# Patient Record
Sex: MALE | Race: WHITE | NOT HISPANIC OR LATINO | Employment: OTHER | URBAN - METROPOLITAN AREA
[De-identification: names, ages, dates, MRNs, and addresses within clinical notes are randomized per-mention and may not be internally consistent; named-entity substitution may affect disease eponyms.]

---

## 2017-07-19 ENCOUNTER — GENERIC CONVERSION - ENCOUNTER (OUTPATIENT)
Dept: OTHER | Facility: OTHER | Age: 67
End: 2017-07-19

## 2017-08-17 ENCOUNTER — ALLSCRIPTS OFFICE VISIT (OUTPATIENT)
Dept: OTHER | Facility: OTHER | Age: 67
End: 2017-08-17

## 2017-08-17 DIAGNOSIS — Z11.59 ENCOUNTER FOR SCREENING FOR OTHER VIRAL DISEASES: ICD-10-CM

## 2017-08-17 DIAGNOSIS — Z12.5 ENCOUNTER FOR SCREENING FOR MALIGNANT NEOPLASM OF PROSTATE: ICD-10-CM

## 2017-08-17 DIAGNOSIS — I10 ESSENTIAL (PRIMARY) HYPERTENSION: ICD-10-CM

## 2017-08-25 ENCOUNTER — APPOINTMENT (OUTPATIENT)
Dept: LAB | Facility: CLINIC | Age: 67
End: 2017-08-25
Payer: COMMERCIAL

## 2017-08-25 DIAGNOSIS — Z12.5 ENCOUNTER FOR SCREENING FOR MALIGNANT NEOPLASM OF PROSTATE: ICD-10-CM

## 2017-08-25 DIAGNOSIS — I10 ESSENTIAL (PRIMARY) HYPERTENSION: ICD-10-CM

## 2017-08-25 DIAGNOSIS — Z11.59 ENCOUNTER FOR SCREENING FOR OTHER VIRAL DISEASES: ICD-10-CM

## 2017-08-25 LAB
ALBUMIN SERPL BCP-MCNC: 3.4 G/DL (ref 3.5–5)
ALP SERPL-CCNC: 68 U/L (ref 46–116)
ALT SERPL W P-5'-P-CCNC: 25 U/L (ref 12–78)
ANION GAP SERPL CALCULATED.3IONS-SCNC: 9 MMOL/L (ref 4–13)
AST SERPL W P-5'-P-CCNC: 19 U/L (ref 5–45)
BILIRUB SERPL-MCNC: 0.6 MG/DL (ref 0.2–1)
BUN SERPL-MCNC: 13 MG/DL (ref 5–25)
CALCIUM SERPL-MCNC: 8.8 MG/DL (ref 8.3–10.1)
CHLORIDE SERPL-SCNC: 106 MMOL/L (ref 100–108)
CHOLEST SERPL-MCNC: 191 MG/DL (ref 50–200)
CO2 SERPL-SCNC: 25 MMOL/L (ref 21–32)
CREAT SERPL-MCNC: 0.96 MG/DL (ref 0.6–1.3)
GFR SERPL CREATININE-BSD FRML MDRD: 81 ML/MIN/1.73SQ M
GLUCOSE P FAST SERPL-MCNC: 120 MG/DL (ref 65–99)
HDLC SERPL-MCNC: 41 MG/DL (ref 40–60)
LDLC SERPL CALC-MCNC: 105 MG/DL (ref 0–100)
POTASSIUM SERPL-SCNC: 3.7 MMOL/L (ref 3.5–5.3)
PROT SERPL-MCNC: 6.8 G/DL (ref 6.4–8.2)
PSA SERPL-MCNC: 0.4 NG/ML (ref 0–4)
SODIUM SERPL-SCNC: 140 MMOL/L (ref 136–145)
TRIGL SERPL-MCNC: 223 MG/DL

## 2017-08-25 PROCEDURE — 80053 COMPREHEN METABOLIC PANEL: CPT

## 2017-08-25 PROCEDURE — 36415 COLL VENOUS BLD VENIPUNCTURE: CPT

## 2017-08-25 PROCEDURE — 80061 LIPID PANEL: CPT

## 2017-08-25 PROCEDURE — 86803 HEPATITIS C AB TEST: CPT

## 2017-08-25 PROCEDURE — G0103 PSA SCREENING: HCPCS

## 2017-08-26 LAB — HCV AB SER QL: NORMAL

## 2017-08-28 ENCOUNTER — GENERIC CONVERSION - ENCOUNTER (OUTPATIENT)
Dept: OTHER | Facility: OTHER | Age: 67
End: 2017-08-28

## 2017-10-02 ENCOUNTER — GENERIC CONVERSION - ENCOUNTER (OUTPATIENT)
Dept: OTHER | Facility: OTHER | Age: 67
End: 2017-10-02

## 2018-01-09 NOTE — RESULT NOTES
Verified Results  (1) COMPREHENSIVE METABOLIC PANEL [id number] 03:62TT [facility] Order Number: [id number]     Test Name Result Flag Reference   SODIUM 140 mmol/L  136-145   POTASSIUM 3 7 mmol/L  3 5-5 3   CHLORIDE 106 mmol/L  100-108   CARBON DIOXIDE 25 mmol/L  21-32   ANION GAP (CALC) 9 mmol/L  4-13   BLOOD UREA NITROGEN 13 mg/dL  5-25   CREATININE 0 96 mg/dL  0 60-1 30   Standardized to IDMS reference method   CALCIUM 8 8 mg/dL  8 3-10 1   BILI, TOTAL 0 60 mg/dL  0 20-1 00   ALK PHOSPHATAS 68 U/L     ALT (SGPT) 25 U/L  12-78   Specimen collection should occur prior to Sulfasalazine and/or Sulfapyridine administration due to the potential for falsely depressed results  AST(SGOT) 19 U/L  5-45   Specimen collection should occur prior to Sulfasalazine administration due to the potential for falsely depressed results  ALBUMIN 3 4 g/dL L 3 5-5 0   TOTAL PROTEIN 6 8 g/dL  6 4-8 2   eGFR 81 ml/min/1 73sq m     National Kidney Disease Education Program recommendations are as follows:  GFR calculation is accurate only with a steady state creatinine  Chronic Kidney disease less than 60 ml/min/1 73 sq  meters  Kidney failure less than 15 ml/min/1 73 sq  meters  GLUCOSE FASTING 120 mg/dL H 65-99   Specimen collection should occur prior to Sulfasalazine administration due to the potential for falsely depressed results  Specimen collection should occur prior to Sulfapyridine administration due to the potential for falsely elevated results  (1) LIPID PANEL, FASTING [id number] 09:45AM [facility] Order Number: [id number]     Test Name Result Flag Reference   CHOLESTEROL 191 mg/dL     HDL,DIRECT 41 mg/dL  40-60   Specimen collection should occur prior to Metamizole administration due to the potential for falsley depressed results     LDL CHOLESTEROL CALCULATED 105 mg/dL H 0-100   Triglyceride:        Normal ??? ??? ??? ??? ??? ??? ??? <150 mg/dl   ??? ??? ???Borderline High ??? ??? 150-199 mg/dl   ??? ??? ? ?? High ??? ??? ??? ??? ??? ??? ??? 200-499 mg/dl   ??? ??? ? ??Very High ??? ??? ??? ??? ??? >499 mg/dl      Cholesterol:       Desirable ??? ??? ??? ??? <200 mg/dl   ??? ??? Borderline High ??? 200-239 mg/dl   ??? ??? High ??? ??? ??? ??? ??? ??? >239 mg/dl      HDL Cholesterol:       High ??? ???>59 mg/dL   ??? ??? Low ??? ??? <41 mg/dL      This screening LDL is a calculated result  It does not have the accuracy of the Direct Measured LDL in the monitoring of patients with hyperlipidemia and/or statin therapy  Direct Measure LDL ([id number]) must be ordered separately in these patients  TRIGLYCERIDES 223 mg/dL H <=150   Specimen collection should occur prior to N-Acetylcysteine or Metamizole administration due to the potential for falsely depressed results  (1) PSA (SCREEN) (Dx V76 44 Screen for Prostate Cancer) [id number] 09:45AM [facility] Order Number: [id number]     Test Name Result Flag Reference   PROSTATE SPECIFIC ANTIGEN 0 4 ng/mL  0 0-4 0   [employer] Guidelines define biochemical recurrence of prostate cancer as a detectable or rising PSA value post-radical prostatectomy that is greater than or equal to 0 2 ng/mL with a second confirmatory level of greater than or equal to 0 2 ng/mL       (1) HEP C ANTIBODY [id number] 09:45AM [facility] Order Number: [id number]     Test Name Result Flag Reference   HEPATITIS C ANTIBODY Non-reactive  Non-reactive

## 2018-01-13 VITALS
HEART RATE: 73 BPM | BODY MASS INDEX: 28.8 KG/M2 | SYSTOLIC BLOOD PRESSURE: 124 MMHG | TEMPERATURE: 97 F | WEIGHT: 195 LBS | OXYGEN SATURATION: 98 % | DIASTOLIC BLOOD PRESSURE: 72 MMHG

## 2018-01-22 VITALS
TEMPERATURE: 97.6 F | HEART RATE: 65 BPM | DIASTOLIC BLOOD PRESSURE: 88 MMHG | OXYGEN SATURATION: 98 % | WEIGHT: 201 LBS | SYSTOLIC BLOOD PRESSURE: 148 MMHG | BODY MASS INDEX: 29.68 KG/M2

## 2018-05-16 ENCOUNTER — OFFICE VISIT (OUTPATIENT)
Dept: FAMILY MEDICINE CLINIC | Facility: OTHER | Age: 68
End: 2018-05-16
Payer: COMMERCIAL

## 2018-05-16 VITALS
HEIGHT: 67 IN | HEART RATE: 63 BPM | TEMPERATURE: 97.2 F | WEIGHT: 204.6 LBS | DIASTOLIC BLOOD PRESSURE: 72 MMHG | BODY MASS INDEX: 32.11 KG/M2 | SYSTOLIC BLOOD PRESSURE: 130 MMHG | OXYGEN SATURATION: 98 %

## 2018-05-16 DIAGNOSIS — M79.671 RIGHT FOOT PAIN: ICD-10-CM

## 2018-05-16 DIAGNOSIS — I10 BENIGN ESSENTIAL HYPERTENSION: ICD-10-CM

## 2018-05-16 DIAGNOSIS — Z23 NEED FOR PNEUMOCOCCAL VACCINE: Primary | ICD-10-CM

## 2018-05-16 DIAGNOSIS — Z00.00 MEDICARE ANNUAL WELLNESS VISIT, SUBSEQUENT: ICD-10-CM

## 2018-05-16 PROBLEM — J20.9 ACUTE BRONCHITIS: Status: ACTIVE | Noted: 2017-10-02

## 2018-05-16 PROCEDURE — G0009 ADMIN PNEUMOCOCCAL VACCINE: HCPCS | Performed by: FAMILY MEDICINE

## 2018-05-16 PROCEDURE — 3725F SCREEN DEPRESSION PERFORMED: CPT | Performed by: FAMILY MEDICINE

## 2018-05-16 PROCEDURE — 90732 PPSV23 VACC 2 YRS+ SUBQ/IM: CPT | Performed by: FAMILY MEDICINE

## 2018-05-16 PROCEDURE — G0439 PPPS, SUBSEQ VISIT: HCPCS | Performed by: FAMILY MEDICINE

## 2018-05-16 PROCEDURE — 3075F SYST BP GE 130 - 139MM HG: CPT | Performed by: FAMILY MEDICINE

## 2018-05-16 PROCEDURE — 3008F BODY MASS INDEX DOCD: CPT | Performed by: FAMILY MEDICINE

## 2018-05-16 PROCEDURE — 1101F PT FALLS ASSESS-DOCD LE1/YR: CPT | Performed by: FAMILY MEDICINE

## 2018-05-16 PROCEDURE — 3078F DIAST BP <80 MM HG: CPT | Performed by: FAMILY MEDICINE

## 2018-05-16 RX ORDER — AMLODIPINE BESYLATE AND BENAZEPRIL HYDROCHLORIDE 5; 10 MG/1; MG/1
1 CAPSULE ORAL DAILY
COMMUNITY
Start: 2012-01-03 | End: 2018-06-18 | Stop reason: SDUPTHER

## 2018-05-16 NOTE — PATIENT INSTRUCTIONS
Obesity   AMBULATORY CARE:   Obesity  is when your body mass index (BMI) is greater than 30  Your healthcare provider will use your height and weight to measure your BMI  The risks of obesity include  many health problems, such as injuries or physical disability  You may need tests to check for the following:  · Diabetes     · High blood pressure or high cholesterol     · Heart disease     · Gallbladder or liver disease     · Cancer of the colon, breast, prostate, liver, or kidney     · Sleep apnea     · Arthritis or gout  Seek care immediately if:   · You have a severe headache, confusion, or difficulty speaking  · You have weakness on one side of your body  · You have chest pain, sweating, or shortness of breath  Contact your healthcare provider if:   · You have symptoms of gallbladder or liver disease, such as pain in your upper abdomen  · You have knee or hip pain and discomfort while walking  · You have symptoms of diabetes, such as intense hunger and thirst, and frequent urination  · You have symptoms of sleep apnea, such as snoring or daytime sleepiness  · You have questions or concerns about your condition or care  Treatment for obesity  focuses on helping you lose weight to improve your health  Even a small decrease in BMI can reduce the risk for many health problems  Your healthcare provider will help you set a weight-loss goal   · Lifestyle changes  are the first step in treating obesity  These include making healthy food choices and getting regular physical activity  Your healthcare provider may suggest a weight-loss program that involves coaching, education, and therapy  · Medicine  may help you lose weight when it is used with a healthy diet and physical activity  · Surgery  can help you lose weight if you are very obese and have other health problems  There are several types of weight-loss surgery  Ask your healthcare provider for more information    Be successful losing weight:   · Set small, realistic goals  An example of a small goal is to walk for 20 minutes 5 days a week  Anther goal is to lose 5% of your body weight  · Tell friends, family members, and coworkers about your goals  and ask for their support  Ask a friend to lose weight with you, or join a weight-loss support group  · Identify foods or triggers that may cause you to overeat , and find ways to avoid them  Remove tempting high-calorie foods from your home and workplace  Place a bowl of fresh fruit on your kitchen counter  If stress causes you to eat, then find other ways to cope with stress  · Keep a diary to track what you eat and drink  Also write down how many minutes of physical activity you do each day  Weigh yourself once a week and record it in your diary  Eating changes: You will need to eat 500 to 1,000 fewer calories each day than you currently eat to lose 1 to 2 pounds a week  The following changes will help you cut calories:  · Eat smaller portions  Use small plates, no larger than 9 inches in diameter  Fill your plate half full of fruits and vegetables  Measure your food using measuring cups until you know what a serving size looks like  · Eat 3 meals and 1 or 2 snacks each day  Plan your meals in advance  [employer] and eat at home most of the time  Eat slowly  · Eat fruits and vegetables at every meal   They are low in calories and high in fiber, which makes you feel full  Do not add butter, margarine, or cream sauce to vegetables  Use herbs to season steamed vegetables  · Eat less fat and fewer fried foods  Eat more baked or grilled chicken and fish  These protein sources are lower in calories and fat than red meat  Limit fast food  Dress your salads with olive oil and vinegar instead of bottled dressing  · Limit the amount of sugar you eat  Do not drink sugary beverages  Limit alcohol  Activity changes:  Physical activity is good for your body in many ways   It helps you burn calories and build strong muscles  It decreases stress and depression, and improves your mood  It can also help you sleep better  Talk to your healthcare provider before you begin an exercise program   · Exercise for at least 30 minutes 5 days a week  Start slowly  Set aside time each day for physical activity that you enjoy and that is convenient for you  It is best to do both weight training and an activity that increases your heart rate, such as walking, bicycling, or swimming  · Find ways to be more active  Do yard work and housecleaning  Walk up the stairs instead of using elevators  Spend your leisure time going to events that require walking, such as outdoor festivals or fairs  This extra physical activity can help you lose weight and keep it off  Follow up with your healthcare provider as directed: You may need to meet with a dietitian  Write down your questions so you remember to ask them during your visits  © 2017 [address] [employer] Information is for End User's use only and may not be sold, redistributed or otherwise used for commercial purposes  All illustrations and images included in CareNotes® are the copyrighted property of [employer]  or Steven Jarquin  The above information is an  only  It is not intended as medical advice for individual conditions or treatments  Talk to your doctor, nurse or pharmacist before following any medical regimen to see if it is safe and effective for you  Urinary Incontinence   WHAT YOU NEED TO KNOW:   What is urinary incontinence? Urinary incontinence (UI) is when you lose control of your bladder  What causes UI? UI occurs because your bladder cannot store or empty urine properly  The following are the most common types of UI:  · Stress incontinence  is when you leak urine due to increased bladder pressure  This may happen when you cough, sneeze, or exercise       · Urge incontinence  is when you feel the need to urinate right away and leak urine accidentally  · Mixed incontinence  is when you have both stress and urge UI  What are the signs and symptoms of UI?   · You feel like your bladder does not empty completely when you urinate  · You urinate often and need to urinate immediately  · You leak urine when you sleep, or you wake up with the urge to urinate  · You leak urine when you cough, sneeze, exercise, or laugh  How is UI diagnosed? Your healthcare provider will ask how often you leak urine and whether you have stress or urge symptoms  Tell him which medicines you take, how often you urinate, and how much liquid you drink each day  You may need any of the following tests:  · Urine tests  may show infection or kidney function  · A pelvic exam  may be done to check for blockages  A pelvic exam will also show if your bladder, uterus, or other organs have moved out of place  · An x-ray, ultrasound, or CT  may show problems with parts of your urinary system  You may be given contrast liquid to help your organs show up better in the pictures  Tell the healthcare provider if you have ever had an allergic reaction to contrast liquid  Do not enter the MRI room with anything metal  Metal can cause serious injury  Tell the healthcare provider if you have any metal in or on your body  · A bladder scan  will show how much urine is left in your bladder after you urinate  You will be asked to urinate and then healthcare providers will use a small ultrasound machine to check the urine left in your bladder  · Cystometry  is used to check the function of your urinary system  Your healthcare provider checks the pressure in your bladder while filling it with fluid  Your bladder pressure may also be tested when your bladder is full and while you urinate  How is UI treated? · Medicines  can help strengthen your bladder control      · Electrical stimulation  is used to send a small amount of electrical energy to your pelvic floor muscles  This helps control your bladder function  Electrodes may be placed outside your body or in your rectum  For women, the electrodes may be placed in the vagina  · A bulking agent  may be injected into the wall of your urethra to make it thicker  This helps keep your urethra closed and decreases urine leakage  · Devices  such as a clamp, pessary, or tampon may help stop urine leaks  Ask your healthcare provider for more information about these and other devices  · Surgery  may be needed if other treatments do not work  Several types of surgery can help improve your bladder control  Ask your healthcare provider for more information about the surgery you may need  How can I manage my symptoms? · Do pelvic muscle exercises often  Your pelvic muscles help you stop urinating  Squeeze these muscles tight for 5 seconds, then relax for 5 seconds  Gradually work up to squeezing for 10 seconds  Do 3 sets of 15 repetitions a day, or as directed  This will help strengthen your pelvic muscles and improve bladder control  · A catheter  may be used to help empty your bladder  A catheter is a tiny, plastic tube that is put into your bladder to drain your urine  Your healthcare provider may tell you to use a catheter to prevent your bladder from getting too full and leaking urine  · Keep a UI record  Write down how often you leak urine and how much you leak  Make a note of what you were doing when you leaked urine  · Train your bladder  Go to the bathroom at set times, such as every 2 hours, even if you do not feel the urge to go  You can also try to hold your urine when you feel the urge to go  For example, hold your urine for 5 minutes when you feel the urge to go  As that becomes easier, hold your urine for 10 minutes  · Drink liquids as directed  Ask your healthcare provider how much liquid to drink each day and which liquids are best for you   You may need to limit the amount of liquid you drink to help control your urine leakage  Limit or do not have drinks that contain caffeine or alcohol  Do not drink any liquid right before you go to bed  · Prevent constipation  Eat a variety of high-fiber foods  Good examples are high-fiber cereals, beans, vegetables, and whole-grain breads  Prune juice may help make your bowel movement softer  Walking is the best way to trigger your intestines to have a bowel movement  · Exercise regularly and maintain a healthy weight  Ask your healthcare provider how much you should weigh and about the best exercise plan for you  Weight loss and exercise will decrease pressure on your bladder and help you control your leakage  Ask him to help you create a weight loss plan if you are overweight  When should I seek immediate care? · You have severe pain  · You are confused or cannot think clearly  When should I contact my healthcare provider? · You have a fever  · You see blood in your urine  · You have pain when you urinate  · You have new or worse pain, even after treatment  · Your mouth feels dry or you have vision changes  · Your urine is cloudy or smells bad  · You have questions or concerns about your condition or care  CARE AGREEMENT:   You have the right to help plan your care  Learn about your health condition and how it may be treated  Discuss treatment options with your caregivers to decide what care you want to receive  You always have the right to refuse treatment  The above information is an  only  It is not intended as medical advice for individual conditions or treatments  Talk to your doctor, nurse or pharmacist before following any medical regimen to see if it is safe and effective for you  © 2017 [address] [employer] Information is for End User's use only and may not be sold, redistributed or otherwise used for commercial purposes   All illustrations and images included in CareNotes® are the copyrighted property of [employer]  or Steven Jarquin  Cigarette Smoking and Your Health   AMBULATORY CARE:   Risks to your health if you smoke:  Nicotine and other chemicals found in tobacco damage every cell in your body  Even if you are a light smoker, you have an increased risk for cancer, heart disease, and lung disease  If you are pregnant or have diabetes, smoking increases your risk for complications  Benefits to your health if you stop smoking:   · You decrease respiratory symptoms such as coughing, wheezing, and shortness of breath  · You reduce your risk for cancers of the lung, mouth, throat, kidney, bladder, pancreas, stomach, and cervix  If you already have cancer, you increase the benefits of chemotherapy  You also reduce your risk for cancer returning or a second cancer from developing  · You reduce your risk for heart disease, blood clots, heart attack, and stroke  · You reduce your risk for lung infections, and diseases such as pneumonia, asthma, chronic bronchitis, and emphysema  · Your circulation improves  More oxygen can be delivered to your body  If you have diabetes, you lower your risk for complications, such as kidney, artery, and eye diseases  You also lower your risk for nerve damage  Nerve damage can lead to amputations, poor vision, and blindness  · You improve your body's ability to heal and to fight infections  Benefits to the health of others if you stop smoking:  Tobacco is harmful to nonsmokers who breathe in your secondhand smoke  The following are ways the health of others around you may improve when you stop smoking:  · You lower the risks for lung cancer and heart disease in nonsmoking adults  · If you are pregnant, you lower the risk for miscarriage, early delivery, low birth weight, and stillbirth  You also lower your baby's risk for SIDS, obesity, developmental delay, and neurobehavioral problems, such as ADHD  · If you have children, you lower their risk for ear infections, colds, pneumonia, bronchitis, and asthma  For more information and support to stop smoking:   · Smokefree  gov  Phone: 2- 953 - 538-3139  Web Address: www smokefree  gov  Follow up with your healthcare provider as directed:  Write down your questions so you remember to ask them during your visits  © 2017 [facility] Information is for End User's use only and may not be sold, redistributed or otherwise used for commercial purposes  All illustrations and images included in CareNotes® are the copyrighted property of [employer]  or Steven Jarquin  The above information is an  only  It is not intended as medical advice for individual conditions or treatments  Talk to your doctor, nurse or pharmacist before following any medical regimen to see if it is safe and effective for you  Fall Prevention   WHAT YOU NEED TO KNOW:   What is fall prevention? Fall prevention includes ways to make your home and other areas safer  It also includes ways you can move more carefully to prevent a fall  What increases my risk for falls? · Lack of vitamin D    · Not getting enough sleep each night    · Trouble walking or keeping your balance, or foot problems    · Health conditions that cause changes in your blood pressure, vision, or muscle strength and coordination    · Medicines that make you dizzy, weak, or sleepy    · Problems seeing clearly    · Shoes that have high heels or are not supportive    · Tripping hazards, such as items left on the floor, no handrails on the stairs, or broken steps  How can I help protect myself from falls? · Stand or sit up slowly  This may help you keep your balance and prevent falls  If you need to get up during the night, sit up first  Be sure you are fully awake before you stand  Turn on the light before you start walking  Go slowly in case you are still sleepy   Make sure you will not trip over any pets sleeping in the bedroom  · Use assistive devices as directed  Your healthcare provider may suggest that you use a cane or walker to help you keep your balance  You may need to have grab bars put in your bathroom near the toilet or in the shower  · Wear shoes that fit well and have soles that   Wear shoes both inside and outside  Use slippers with good   Do not wear shoes with high heels  · Wear a personal alarm  This is a device that allows you to call 911 if you fall and need help  Ask your healthcare provider for more information  · Stay active  Exercise can help strengthen your muscles and improve your balance  Your healthcare provider may recommend water aerobics or walking  He or she may also recommend physical therapy to improve your coordination  Never start an exercise program without talking to your healthcare provider first      · Manage medical conditions  Keep all appointments with your healthcare providers  Visit your eye doctor as directed  How can I make my home safer? · Add items to prevent falls in the bathroom  Put nonslip strips on your bath or shower floor to prevent you from slipping  Use a bath mat if you do not have carpet in the bathroom  This will prevent you from falling when you step out of the bath or shower  Use a shower seat so you do not need to stand while you shower  Sit on the toilet or a chair in your bathroom to dry yourself and put on clothing  This will prevent you from losing your balance from drying or dressing yourself while you are standing  · Keep paths clear  Remove books, shoes, and other objects from walkways and stairs  Place cords for telephones and lamps out of the way so that you do not need to walk over them  Tape them down if you cannot move them  Remove small rugs  If you cannot remove a rug, secure it with double-sided tape  This will prevent you from tripping  · Install bright lights in your home  Use night lights to help light paths to the bathroom or kitchen  Always turn on the light before you start walking  · Keep items you use often on shelves within reach  Do not use a step stool to help you reach an item  · Paint or place reflective tape on the edges of your stairs  This will help you see the stairs better  Call 911 or have someone else call if:   · You have fallen and are unconscious  · You have fallen and cannot move part of your body  Contact your healthcare provider if:   · You have fallen and have pain or a headache  · You have questions or concerns about your condition or care  CARE AGREEMENT:   You have the right to help plan your care  Learn about your health condition and how it may be treated  Discuss treatment options with your caregivers to decide what care you want to receive  You always have the right to refuse treatment  The above information is an  only  It is not intended as medical advice for individual conditions or treatments  Talk to your doctor, nurse or pharmacist before following any medical regimen to see if it is safe and effective for you  © 2017 [address] [facility] Information is for End User's use only and may not be sold, redistributed or otherwise used for commercial purposes  All illustrations and images included in CareNotes® are the copyrighted property of [employer]  or Steven Jarquin  Advance Directives   WHAT YOU NEED TO KNOW:   What are advance directives? Advance directives are legal documents that state your wishes and plans for medical care  These plans are made ahead of time in case you lose your ability to make decisions for yourself  Advance directives can apply to any medical decision, such as the treatments you want, and if you want to donate organs  What are the types of advance directives? There are many types of advance directives, and each state has rules about how to use them   You may choose a combination of any of the following:  · Living will: This is a written record of the treatment you want  You can also choose which treatments you do not want, which to limit, and which to stop at a certain time  This includes surgery, medicine, IV fluid, and tube feedings  · Durable power of  for healthcare [facility]): This is a written record that states who you want to make healthcare choices for you when you are unable to make them for yourself  This person, called a proxy, is usually a family member or a friend  You may choose more than 1 proxy  · Do not resuscitate (DNR) order:  A DNR order is used in case your heart stops beating or you stop breathing  It is a request not to have certain forms of treatment, such as CPR  A DNR order may be included in other types of advance directives  · Medical directive: This covers the care that you want if you are in a coma, near death, or unable to make decisions for yourself  You can list the treatments you want for each condition  Treatment may include pain medicine, surgery, blood transfusions, dialysis, IV or tube feedings, and a ventilator (breathing machine)  · Values history: This document has questions about your views, beliefs, and how you feel and think about life  This information can help others choose the care that you would choose  Why are advance directives important? An advance directive helps you control your care  Although spoken wishes may be used, it is better to have your wishes written down  Spoken wishes can be misunderstood, or not followed  Treatments may be given even if you do not want them  An advance directive may make it easier for your family to make difficult choices about your care  How do I decide what to put in my advance directives? · Make informed decisions:  Make sure you fully understand treatments or care you may receive   Think about the benefits and problems your decisions could cause for you or your family  Talk to healthcare providers if you have concerns or questions before you write down your wishes  You may also want to talk with your Catholic or , or a   Check your state laws to make sure that what you put in your advance directive is legal      · Sign all forms:  Sign and date your advance directive when you have finished  You may also need 2 witnesses to sign the forms  Witnesses cannot be your doctor or his staff, your spouse, heirs or beneficiaries, people you owe money to, or your chosen proxy  Talk to your family, proxy, and healthcare providers about your advance directive  Give each person a copy, and keep one for yourself in a place you can get to easily  Do not keep it hidden or locked away  · Review and revise your plans: You can revise your advance directive at any time, as long as you are able to make decisions  Review your plan every year, and when there are changes in your life, or your health  When you make changes, let your family, proxy, and healthcare providers know  Give each a new copy  Where can I find more information? · [employer]  [address] , [address]   Phone: 0- [phone] - [phone]  Phone: 6- 164 - [phone]  Web Address: http://www  aafp org  · [address] [facility])  [address] [facility], [address]  Phone: 1- [phone] - [phone]  Phone: [phone]  Web Address: [address]  [facility] AGREEMENT:   You have the right to help plan your care  To help with this plan, you must learn about your health condition and treatment options  You must also learn about advance directives and how they are used  Work with your healthcare providers to decide what care will be used to treat you  You always have the right to refuse treatment  The above information is an  only   It is not intended as medical advice for individual conditions or treatments  Talk to your doctor, nurse or pharmacist before following any medical regimen to see if it is safe and effective for you  © 2017 [address] [employer] Information is for End User's use only and may not be sold, redistributed or otherwise used for commercial purposes  All illustrations and images included in CareNotes® are the copyrighted property of [employer]  or [address]

## 2018-05-16 NOTE — PROGRESS NOTES
HPI:  Maria A Muniz is a 69 y o  male here for his Subsequent Wellness Visit  Patient Active Problem List   Diagnosis    Acute bronchitis    Benign essential hypertension    Diarrhea    Lower back pain    Rib pain on left side     Past Medical History:   Diagnosis Date    Cataract, right eye     last assessed 04/21/2015    Hypertension     Pneumonia      Past Surgical History:   Procedure Laterality Date    CATARACT EXTRACTION, BILATERAL      MYRINGOTOMY W/ TUBES       Family History   Problem Relation Age of Onset    Hypertension Father     No Known Problems Mother      History   Smoking Status    Never Smoker   Smokeless Tobacco    Never Used     History   Alcohol Use    Yes     Comment: social      History   Drug Use No     /72 (BP Location: Right arm, Patient Position: Sitting, Cuff Size: Standard)   Pulse 63   Temp (!) 97 2 °F (36 2 °C) (Tympanic)   Ht 5' 7" (1 702 m)   Wt 92 8 kg (204 lb 9 6 oz)   SpO2 98%   BMI 32 04 kg/m²       Current Outpatient Prescriptions   Medication Sig Dispense Refill    amLODIPine-benazepril (LOTREL 5-10) 5-10 MG per capsule Take 1 capsule by mouth daily       No current facility-administered medications for this visit        Allergies   Allergen Reactions    Penicillamine     Penicillins      Immunization History   Administered Date(s) Administered    Influenza Quadrivalent Preservative Free 3 years and older IM 10/21/2014    Influenza Split High Dose Preservative Free IM 10/27/2015, 10/26/2016    Influenza TIV (IM) 01/15/2013, 10/29/2013    Pneumococcal Conjugate 13-Valent 08/17/2017    Pneumococcal Polysaccharide PPV23 05/16/2018    Td (adult), adsorbed 10/31/2000       Patient Care Team:  Derick Valladares DO as PCP - General    Medicare Screening Tests and Risk Assessments:  AWV Clinical     ISAR:   Previous hospitalizations?:  No       Once in a Lifetime Medicare Screening:       Medicare Screening Tests and Risk Assessment:   AAA Risk Assessment    Osteoporosis Risk Assessment    HIV Risk Assessment        Drug and Alcohol Use:   Tobacco use    Cigarettes:  never smoker    Tobacco use duration    Tobacco Cessation Readiness    Alcohol use    Alcohol use:  occasional use    Alcohol Treatment Readiness   Illicit Drug Use    Drug use:  never        Diet & Exercise:   Diet   What is your diet?:  Frequent junk food   How many servings a day of the following:   Fruits and Vegetables:  1-2     Soda:  1   Exercise    Do you currently exercise?:  currently not exercising       Cognitive Impairment Screening:   Cognitive Impairment Screening    Do you have difficulty learning or retaining new information?:  No Do you have difficulty handling new tasks?:  No   Do you have difficulty with reasoning?:  No    Do you have difficulty with language?:  No        Functional Ability/Level of Safety:   Hearing    Hearing difficulties:  Yes    Hearing aid:  No    Hearing Impairment Assessment    Current Activities    Help needed with the folllowing:    Using the phone:  No Transportation:  No   Shopping:  No Preparing Meals:  No   Doing Housework:  No Doing Laundry:  No   Managing Medications:  No Managing Money:  No   ADL    Fall Risk   Have you fallen in the last 12 months?:  No    Injury History       Home Safety:   Home Safety Risk Factors    Uneven floors:  No    Loose rugs:  No   Household clutter:  No Poor household lighting:  No       Advanced Directives:   Advanced Directives    Living Will:  Yes Durable POA for healthcare:   Yes   Advanced directive:  Yes    Patient's End of Life Decisions        Urinary Incontinence:       Glaucoma:            Provider Screening     Preventative Screening/Counseling:   Cardiovascular Screening/Counseling:   (Labs Q5 years, EKG optional one-time)   General:  Risks and Benefits Discussed, Patient Declines Counseling:  Healthy Diet, Healthy Weight, Improve Cholesterol, Improve Blood Pressure, Improve Exercise Tolerance Diabetes Screening/Counseling:   (2 tests/year if Pre-Diabetes or 1 test/year if no Diabetes)   General:  Risks and Benefits Discussed, Screening Current Counseling:  Healthy Diet, Healthy Weight, Improve Physical Activity Due for: Labs:  Blood Glucose         Colorectal Cancer Screening/Counseling:   (FOBT Q1 yr; Flex Sig Q4 yrs or Q10 yrs after Screening Colonoscopy; Screening Colonoscpy Q2 yrs High Risk or Q10 yrs Low Risk; Barium Enema Q2 yrs High Risk or Q4 yrs Low Risk)   General:  Risks and Benefits Discussed, Patient Declines Counseling:  high fiber diet          Prostate Cancer Screening/Counseling:   (Annual)    General:  Risks and Benefits Discussed, Patient Declines          Breast Cancer Screening/Counseling:   (Baseline Age 28 - 43; Annual Age 36+)         Cervical Cancer Screening/Counseling:   (Annual for High Risk or Childbearing Age with Abnormal Pap in Last 3 yrs; Every 2 all others)         Osteoporosis Screening/Counseling:   (Every 2 Yrs if at risk or more if medically necessary)   General:  Risks and Benefits Discussed, Patient Declines Counseling:  Calcium and Vitamin D Intake, Regular Weightbearing Exercise          AAA Screening/Counseling:   (Once per Lifetime with risk factors)          Glaucoma Screening/Counseling:   (Annual)   General:  Screening Current          HIV Screening/Counseling:   (Voluntary;  Once annually for high risk OR 3 times for Pregnancy at diagnosis of IUP; 3rd trimester; and at Labor         Hepatitis C Screening:             Immunizations:   Pneumococcal (Once in a Lifetime):  Risks & Benefits Discussed, Pneumococcal Due Today   Tdap (Non-Medicare Wellness Visit required):  Risks & Benefits Discussed, Patient Declines       Other Preventative Couseling (Non-Medicare Wellness Visit Required):   nutrition counseling performed, fall prevention education provided, car/seat belt/driving safety reviewed, alcohol use counseling provided, sunscreen education provided, weight reduction was discussed, Skin self-exam counseling given, Increased physical activity counseling given       Referrals (Non-Medicare Wellness Visit Required):       Medical Equipment/Suppliers:           No exam data present    Physical Exam :  Physical Exam   Constitutional: He is oriented to person, place, and time  He appears well-developed and well-nourished  No distress  HENT:   Head: Normocephalic and atraumatic  Right Ear: External ear normal    Left Ear: External ear normal    Nose: Nose normal    Mouth/Throat: Oropharynx is clear and moist  No oropharyngeal exudate  Eyes: Conjunctivae and EOM are normal  Pupils are equal, round, and reactive to light  Right eye exhibits no discharge  Left eye exhibits no discharge  No scleral icterus  Neck: Normal range of motion  Neck supple  No thyromegaly present  Cardiovascular: Normal rate, regular rhythm and normal heart sounds  No murmur heard  Pulmonary/Chest: Effort normal and breath sounds normal  No respiratory distress  He has no wheezes  Abdominal: Soft  Bowel sounds are normal  He exhibits no distension  There is no tenderness  Lymphadenopathy:     He has no cervical adenopathy  Neurological: He is alert and oriented to person, place, and time  He displays normal reflexes  No cranial nerve deficit  Skin: Skin is warm and dry  No rash noted  He is not diaphoretic  No pallor  Psychiatric: He has a normal mood and affect  His behavior is normal    Nursing note and vitals reviewed  Reviewed Updated [facility] Prior Wellness Visits:   Last Medicare wellness visit information was reviewed, patient interviewed , no change since last AWVyes  Last Medicare wellness visit information was reviewed, patient interviewed and updates made to the record today yes    Assessment and Plan:  1  Need for pneumococcal vaccine  Pneumococcal Polysaccharide Vaccine 23-Valent =>3yo SQ IM   2  Medicare annual wellness visit, subsequent     3   Right foot pain  XR foot 3+ vw right    CBC and differential    Comprehensive metabolic panel    TSH, 3rd generation with T4 reflex   4   Benign essential hypertension  CBC and differential    Comprehensive metabolic panel    TSH, 3rd generation with T4 reflex       Health Maintenance Due   Topic Date Due    SLP PLAN OF CARE  1950    GLAUCOMA SCREENING 67+ YR  12/09/2015

## 2018-06-18 DIAGNOSIS — I10 ESSENTIAL HYPERTENSION: Primary | ICD-10-CM

## 2018-06-18 RX ORDER — AMLODIPINE BESYLATE AND BENAZEPRIL HYDROCHLORIDE 5; 10 MG/1; MG/1
CAPSULE ORAL
Qty: 30 CAPSULE | Refills: 3 | Status: SHIPPED | OUTPATIENT
Start: 2018-06-18 | End: 2018-07-24 | Stop reason: SDUPTHER

## 2018-07-24 DIAGNOSIS — I10 ESSENTIAL HYPERTENSION: ICD-10-CM

## 2018-07-24 RX ORDER — AMLODIPINE BESYLATE AND BENAZEPRIL HYDROCHLORIDE 5; 10 MG/1; MG/1
CAPSULE ORAL
Qty: 90 CAPSULE | Refills: 1 | Status: SHIPPED | OUTPATIENT
Start: 2018-07-24 | End: 2019-04-12 | Stop reason: SDUPTHER

## 2018-07-27 ENCOUNTER — OFFICE VISIT (OUTPATIENT)
Dept: FAMILY MEDICINE CLINIC | Facility: OTHER | Age: 68
End: 2018-07-27
Payer: COMMERCIAL

## 2018-07-27 VITALS
BODY MASS INDEX: 31.92 KG/M2 | DIASTOLIC BLOOD PRESSURE: 70 MMHG | OXYGEN SATURATION: 98 % | HEART RATE: 63 BPM | SYSTOLIC BLOOD PRESSURE: 124 MMHG | TEMPERATURE: 99.3 F | WEIGHT: 203.8 LBS

## 2018-07-27 DIAGNOSIS — B35.4 RINGWORM OF BODY: Primary | ICD-10-CM

## 2018-07-27 PROCEDURE — 99214 OFFICE O/P EST MOD 30 MIN: CPT | Performed by: FAMILY MEDICINE

## 2018-07-27 RX ORDER — CLOTRIMAZOLE AND BETAMETHASONE DIPROPIONATE 10; .64 MG/G; MG/G
CREAM TOPICAL 2 TIMES DAILY
Qty: 30 G | Refills: 0 | Status: SHIPPED | OUTPATIENT
Start: 2018-07-27 | End: 2018-11-28

## 2018-07-27 NOTE — PATIENT INSTRUCTIONS
Skin Yeast Infection   WHAT YOU NEED TO KNOW:   Yeast is normally present on the skin  Infection happens when you have too much yeast, or when it gets into a cut on your skin  Certain types of mold and fungus can cause a yeast infection  A skin yeast infection can appear anywhere on your skin or nail beds  Skin yeast infections are usually found on warm, moist parts of the body  Examples include between skin folds or under the breasts  DISCHARGE INSTRUCTIONS:   Return to the emergency department if:   · You have signs of infection, such as pus, warmth or red streaks coming from the wound, or a fever  Contact your healthcare provider if:   · Your symptoms worsen or do not get better within 7 to 10 days  · You have new or returning signs of a skin yeast infection after treatment  · You have questions or concerns about your condition or care  Medicines:   · Antifungal medicine  may be given as a cream, ointment, or pill  · Take your medicine as directed  Contact your healthcare provider if you think your medicine is not helping or if you have side effects  Tell him or her if you are allergic to any medicine  Keep a list of the medicines, vitamins, and herbs you take  Include the amounts, and when and why you take them  Bring the list or the pill bottles to follow-up visits  Carry your medicine list with you in case of an emergency  Care for the skin near the infection:  You may only have discolored patches of skin, or areas that are dry and flaking  Care for these skin problems as directed by your healthcare provider  If you have painful skin or an open sore, you will need to protect the skin and prevent damage  You will also need to keep the skin dry as much as possible  Ask your healthcare provider how to care for your skin while the infection clears  The following are general guidelines for caring for painful or open skin:  · Keep the skin clean    Ask your healthcare provider if you should wash with mild soap and water  Do not use soap that contains alcohol  Alcohol can dry and irritate the skin and make symptoms worse  Your baby's healthcare provider may tell you to use diaper cream or ointment when you change his diaper  This will protect the skin and prevent moisture from collecting  · Keep the skin dry  Pat the area dry with a towel  Do not rub, because this may irritate the skin  If you have a skin yeast infection between skin folds, lift the top part gently and hold it while you dry between your skin folds  Always dry your feet completely after you swim or bathe, including between your toes  Dry your skin if you are sweating from exercise or exposure to heat  Use a clean towel each time to prevent spreading or continuing the infection  · Keep the skin protected  Ask your healthcare provider if you should cover the area with a bandage or leave it open  Check your skin each day to make sure you do not have new or worsening problems  You may need to have someone check the skin if you cannot see the area easily  Prevent another skin yeast infection:   · Do not share clothing or towels    · Wear shower shoes if you need to use a public shower    · Dry your feet completely after you bathe, and apply antifungal powder or cream as directed    · Put on socks before you get dressed so you do not spread fungus from your feet    · Wear light clothing that allows air to get to your skin    · Manage your weight to prevent skin folds where yeast can collect    · Manage diabetes    · Change your baby's diaper often, and keep the area clean and dry as much as possible    · Use a diaper cream or ointment that contains zinc oxide or dimethicone on your baby's diaper area as directed  Follow up with your healthcare provider as directed:  Write down your questions so you remember to ask them during your visits     © 2017 [employer] Information is for End User's use only and may not be sold, redistributed or otherwise used for commercial purposes  All illustrations and images included in CareNotes® are the copyrighted property of [employer]  or Steven Jarquin  The above information is an  only  It is not intended as medical advice for individual conditions or treatments  Talk to your doctor, nurse or pharmacist before following any medical regimen to see if it is safe and effective for you

## 2018-07-27 NOTE — PROGRESS NOTES
Assessment/Plan:  Patient info hand out provided today  Keep the skin clean  Apply the cream bid for 2 weeks or until the rash is gone  If any change in rash or worsening to call or come in for recheck  FU PRN        Problem List Items Addressed This Visit     None      Visit Diagnoses     Ringworm of body    -  Primary    Relevant Medications    clotrimazole-betamethasone (LOTRISONE) 1-0 05 % cream            Subjective:      Patient ID: Booker Dobson is a 69 y o  male  Rash   This is a new problem  The problem is unchanged  The affected locations include the right upper leg  The rash is characterized by dryness, redness, peeling and itchiness  It is unknown if there was an exposure to a precipitant  Pertinent negatives include no anorexia, congestion, cough, diarrhea, facial edema, fatigue, fever, shortness of breath or vomiting  Past treatments include nothing  The treatment provided no relief  The following portions of the patient's history were reviewed and updated as appropriate: allergies, current medications, past family history, past medical history, past social history, past surgical history and problem list     Review of Systems   Constitutional: Negative for chills, fatigue and fever  HENT: Negative for congestion  Respiratory: Negative for cough and shortness of breath  Cardiovascular: Negative for chest pain and leg swelling  Gastrointestinal: Negative for abdominal pain, anorexia, diarrhea, nausea and vomiting  Genitourinary: Negative for dysuria and flank pain  Musculoskeletal: Negative for arthralgias and myalgias  Skin: Positive for rash  Negative for pallor and wound  Neurological: Negative for tremors, weakness and numbness  Hematological: Negative for adenopathy           Objective:      /70 (BP Location: Left arm, Patient Position: Sitting, Cuff Size: Standard)   Pulse 63   Temp 99 3 °F (37 4 °C) (Tympanic)   Wt 92 4 kg (203 lb 12 8 oz)   SpO2 98% BMI 31 92 kg/m²          Physical Exam   Constitutional: He appears well-developed and well-nourished  No distress  HENT:   Head: Normocephalic and atraumatic  Neck: Normal range of motion  Neck supple  Cardiovascular: Normal rate, regular rhythm, normal heart sounds and intact distal pulses  No murmur heard  Pulmonary/Chest: Effort normal and breath sounds normal  No respiratory distress  He has no wheezes  Skin: Skin is warm and dry  Rash noted  He is not diaphoretic  No erythema  No pallor  There is a oval patch of slightly raised with scaling and erythema of the lateral left high measuring 2 5 cm in diameter  Per patient its very itchy  No pain or induration upon palpation noted  No purulence  No central bite phuong noted  Psychiatric: He has a normal mood and affect  His behavior is normal    Nursing note and vitals reviewed

## 2018-11-16 ENCOUNTER — OFFICE VISIT (OUTPATIENT)
Dept: FAMILY MEDICINE CLINIC | Facility: OTHER | Age: 68
End: 2018-11-16
Payer: COMMERCIAL

## 2018-11-16 VITALS
SYSTOLIC BLOOD PRESSURE: 138 MMHG | OXYGEN SATURATION: 98 % | HEART RATE: 64 BPM | BODY MASS INDEX: 31.13 KG/M2 | WEIGHT: 205.4 LBS | TEMPERATURE: 98.5 F | HEIGHT: 68 IN | DIASTOLIC BLOOD PRESSURE: 84 MMHG

## 2018-11-16 DIAGNOSIS — J20.9 ACUTE BRONCHITIS, UNSPECIFIED ORGANISM: Primary | ICD-10-CM

## 2018-11-16 DIAGNOSIS — R04.0 EPISTAXIS: ICD-10-CM

## 2018-11-16 PROCEDURE — 3008F BODY MASS INDEX DOCD: CPT | Performed by: NURSE PRACTITIONER

## 2018-11-16 PROCEDURE — 99214 OFFICE O/P EST MOD 30 MIN: CPT | Performed by: NURSE PRACTITIONER

## 2018-11-16 RX ORDER — AZITHROMYCIN 250 MG/1
TABLET, FILM COATED ORAL
Qty: 6 TABLET | Refills: 0 | Status: SHIPPED | OUTPATIENT
Start: 2018-11-16 | End: 2018-11-21

## 2018-11-16 RX ORDER — BENZONATATE 200 MG/1
200 CAPSULE ORAL 3 TIMES DAILY PRN
Qty: 20 CAPSULE | Refills: 0 | Status: SHIPPED | OUTPATIENT
Start: 2018-11-16 | End: 2018-11-28

## 2018-11-16 RX ORDER — INFLUENZA A VIRUSA/MICHIGAN/45/2015 X-275 (H1N1) ANTIGEN (FORMALDEHYDE INACTIVATED), INFLUENZA A VIRUS A/HONG KONG/4801/2014 X-263B (H3N2) ANTIGEN (FORMALDEHYDE INACTIVATED), AND INFLUENZA B VIRUS B/BRISBANE/60/2008 ANTIGEN (FORMALDEHYDE INACTIVATED) 60; 60; 60 UG/.5ML; UG/.5ML; UG/.5ML
INJECTION, SUSPENSION INTRAMUSCULAR
Refills: 0 | COMMUNITY
Start: 2018-11-06 | End: 2018-11-16

## 2018-11-16 NOTE — PATIENT INSTRUCTIONS
Acute Bronchitis   WHAT YOU NEED TO KNOW:   Acute bronchitis is swelling and irritation in the air passages of your lungs  This irritation may cause you to cough or have other breathing problems  Acute bronchitis often starts because of another illness, such as a cold or the flu  The illness spreads from your nose and throat to your windpipe and airways  Bronchitis is often called a chest cold  Acute bronchitis lasts about 3 to 6 weeks and is usually not a serious illness  Your cough can last for several weeks  DISCHARGE INSTRUCTIONS:   Return to the emergency department if:   · You cough up blood  · Your lips or fingernails turn blue  · You feel like you are not getting enough air when you breathe  Contact your healthcare provider if:   · You have a fever  · Your breathing problems do not go away or get worse  · Your cough does not get better within 4 weeks  · You have questions or concerns about your condition or care  Self-care:   · Get more rest   Rest helps your body to heal  Slowly start to do more each day  Rest when you feel it is needed  · Avoid irritants in the air  Avoid chemicals, fumes, and dust  Wear a face mask if you must work around dust or fumes  Stay inside on days when air pollution levels are high  If you have allergies, stay inside when pollen counts are high  Do not use aerosol products, such as spray-on deodorant, bug spray, and hair spray  · Do not smoke or be around others who smoke  Nicotine and other chemicals in cigarettes and cigars damages the cilia that move mucus out of your lungs  Ask your healthcare provider for information if you currently smoke and need help to quit  E-cigarettes or smokeless tobacco still contain nicotine  Talk to your healthcare provider before you use these products  · Drink liquids as directed  Liquids help keep your air passages moist and help you cough up mucus   You may need to drink more liquids when you have acute bronchitis  Ask how much liquid to drink each day and which liquids are best for you  · Use a humidifier or vaporizer  Use a cool mist humidifier or a vaporizer to increase air moisture in your home  This may make it easier for you to breathe and help decrease your cough  Decrease risk for acute bronchitis:   · Get the vaccinations you need  Ask your healthcare provider if you should get vaccinated against the flu or pneumonia  · Prevent the spread of germs  You can decrease your risk of acute bronchitis and other illnesses by doing the following:     [facility] your hands often with soap and water  Carry germ-killing hand lotion or gel with you  You can use the lotion or gel to clean your hands when soap and water are not available  ¨ Do not touch your eyes, nose, or mouth unless you have washed your hands first     ¨ Always cover your mouth when you cough to prevent the spread of germs  It is best to cough into a tissue or your shirt sleeve instead of into your hand  Ask those around you cover their mouths when they cough  ¨ Try to avoid people who have a cold or the flu  If you are sick, stay away from others as much as possible  Medicines: Your healthcare provider may  give you any of the following:  · Ibuprofen or acetaminophen  are medicines that help lower your fever  They are available without a doctor's order  Ask your healthcare provider which medicine is right for you  Ask how much to take and how often to take it  Follow directions  These medicines can cause stomach bleeding if not taken correctly  Ibuprofen can cause kidney damage  Do not take ibuprofen if you have kidney disease, an ulcer, or allergies to aspirin  Acetaminophen can cause liver damage  Do not take more than 4,000 milligrams in 24 hours  · Decongestants  help loosen mucus in your lungs and make it easier to cough up  This can help you breathe easier  · Cough suppressants  decrease your urge to cough   If your cough produces mucus, do not take a cough suppressant unless your healthcare provider tells you to  Your healthcare provider may suggest that you take a cough suppressant at night so you can rest     · Inhalers  may be given  Your healthcare provider may give you one or more inhalers to help you breathe easier and cough less  An inhaler gives your medicine to open your airways  Ask your healthcare provider to show you how to use your inhaler correctly  · Take your medicine as directed  Contact your healthcare provider if you think your medicine is not helping or if you have side effects  Tell him of her if you are allergic to any medicine  Keep a list of the medicines, vitamins, and herbs you take  Include the amounts, and when and why you take them  Bring the list or the pill bottles to follow-up visits  Carry your medicine list with you in case of an emergency  Follow up with your healthcare provider as directed:  Write down questions you have so you will remember to ask them during your follow-up visits  © 2017 [employer] Information is for End User's use only and may not be sold, redistributed or otherwise used for commercial purposes  All illustrations and images included in CareNotes® are the copyrighted property of [employer]  or Steven Jarquin  The above information is an  only  It is not intended as medical advice for individual conditions or treatments  Talk to your doctor, nurse or pharmacist before following any medical regimen to see if it is safe and effective for you

## 2018-11-16 NOTE — PROGRESS NOTES
Assessment/Plan:         Problem List Items Addressed This Visit     Acute bronchitis  --Advise rest, fluids, OTC expectorant, cool mist humidifier, saline nasal spray, gargles, lozenges  --Declines Rx cough suppressant for night-time, but would like something he can take during the day-->Tessalon  Cautioned regarding potential for sedation  --With weekend approaching, and patient's reported past history of PNA, will give Rx for antibiotic to be filled and started if no improvement/worsening over the next few days    Relevant Medications    azithromycin (ZITHROMAX) 250 mg tablet    benzonatate (TESSALON) 200 MG capsule TID prn    Epistaxis (episodic, left sided only)    Relevant Orders    Ambulatory Referral to Otolaryngology            Subjective:      Patient ID: Shawna Arroyo is a 69 y o  male  Here with complaints of sore throat, cough productive of "orange" sputum, head/nasal congestion, rhinorrhea, ear pressure x 5 days  No fever, chills, body aches, wheezing, chest tightness, dyspnea  Normal appetite  No N/V/D, abdominal pain  Taking Dayquil which helps somewhat  States he is prone to getting pneumonia, most recently 2-3 years ago  Had both Prevnar and Pneumovax  Denies history of asthma  Non-smoker  Gets occasional, painless, left sided nosebleeds x years  Saw ENT 2014  Had cauterized, which helped  Starting to occur again the past few weeks, however  Would like to get back in to see ENT  Had flu shot a week ago  The following portions of the patient's history were reviewed and updated as appropriate: allergies, current medications, past family history, past medical history, past social history, past surgical history and problem list     Review of Systems   Constitutional: Positive for fatigue  Negative for fever  HENT: Positive for postnasal drip, rhinorrhea and sore throat  Negative for ear discharge  Eyes: Negative for discharge  Respiratory: Positive for cough  Negative for shortness of breath and wheezing  Cardiovascular: Negative for chest pain  Gastrointestinal: Negative for abdominal pain, nausea and vomiting  Musculoskeletal: Negative for myalgias  Neurological: Positive for headaches  Objective:      /84 (BP Location: Left arm, Patient Position: Sitting, Cuff Size: Adult)   Pulse 64   Temp 98 5 °F (36 9 °C) (Tympanic)   Ht 5' 8" (1 727 m)   Wt 93 2 kg (205 lb 6 4 oz)   SpO2 98%   BMI 31 23 kg/m²          Physical Exam   Constitutional: He is oriented to person, place, and time  He appears well-developed and well-nourished  No distress  HENT:   Head: Normocephalic and atraumatic  Right Ear: External ear normal    Left Ear: External ear normal    Mouth/Throat: Oropharynx is clear and moist  No oropharyngeal exudate  Turbinates erythematous, swollen with deviated septum, no evidence of epistaxis  Eyes: Pupils are equal, round, and reactive to light  Conjunctivae are normal  Right eye exhibits no discharge  Left eye exhibits no discharge  Neck: Normal range of motion  Neck supple  Cardiovascular: Normal rate, regular rhythm and normal heart sounds  Pulmonary/Chest: Effort normal and breath sounds normal  No respiratory distress  He has no wheezes  He has no rales  Breathing easy  RR=16  Occasional cough  Abdominal: Soft  Bowel sounds are normal  There is no tenderness  Lymphadenopathy:     He has no cervical adenopathy  Neurological: He is alert and oriented to person, place, and time  Skin: Skin is warm and dry  No rash noted  He is not diaphoretic  Psychiatric: He has a normal mood and affect

## 2018-11-28 ENCOUNTER — OFFICE VISIT (OUTPATIENT)
Dept: FAMILY MEDICINE CLINIC | Facility: OTHER | Age: 68
End: 2018-11-28
Payer: COMMERCIAL

## 2018-11-28 VITALS
OXYGEN SATURATION: 98 % | SYSTOLIC BLOOD PRESSURE: 130 MMHG | DIASTOLIC BLOOD PRESSURE: 84 MMHG | TEMPERATURE: 97.9 F | HEART RATE: 61 BPM

## 2018-11-28 DIAGNOSIS — J40 CHRONIC SINUSITIS WITH RECURRENT BRONCHITIS: Primary | ICD-10-CM

## 2018-11-28 DIAGNOSIS — J32.9 CHRONIC SINUSITIS WITH RECURRENT BRONCHITIS: Primary | ICD-10-CM

## 2018-11-28 PROCEDURE — 4040F PNEUMOC VAC/ADMIN/RCVD: CPT | Performed by: FAMILY MEDICINE

## 2018-11-28 PROCEDURE — 1036F TOBACCO NON-USER: CPT | Performed by: FAMILY MEDICINE

## 2018-11-28 PROCEDURE — 99214 OFFICE O/P EST MOD 30 MIN: CPT | Performed by: FAMILY MEDICINE

## 2018-11-28 PROCEDURE — 1160F RVW MEDS BY RX/DR IN RCRD: CPT | Performed by: FAMILY MEDICINE

## 2018-11-28 RX ORDER — LEVOFLOXACIN 500 MG/1
500 TABLET, FILM COATED ORAL EVERY 24 HOURS
Qty: 10 TABLET | Refills: 0 | Status: SHIPPED | OUTPATIENT
Start: 2018-11-28 | End: 2018-12-08

## 2018-11-28 NOTE — PROGRESS NOTES
Subjective:      Patient ID: Afsaneh Martinez is a 69 y o  male  Chief Complaint   Patient presents with    Cold Like Symptoms     cough (productive at times), sore throat, congestion, fatigue         Patient presents complaining of cough and fatigue  Was seen 2 weeks ago for same and diagnosed with acute bronchitis  Empiric treatment form of Z-Luigi and Tessalon Perles was started at that time  Patient reports only mild symptom relief--does not believe he ever fully recovered  Symptoms worse the last week, patient here today for re-evaluation      Cough   This is a recurrent problem  The current episode started 1 to 4 weeks ago  The problem has been waxing and waning  The problem occurs constantly  The cough is productive of purulent sputum  Associated symptoms include ear congestion, headaches, nasal congestion, postnasal drip and a sore throat  Pertinent negatives include no chest pain, chills, ear pain, eye redness, fever, heartburn, hemoptysis, myalgias, rash, rhinorrhea, shortness of breath, sweats, weight loss or wheezing  The symptoms are aggravated by exercise  Treatments tried: zpak  The treatment provided mild relief  His past medical history is significant for pneumonia (history of)  There is no history of asthma or COPD  Fatigue   This is a recurrent problem  The current episode started 1 to 4 weeks ago  The problem occurs constantly  The problem has been waxing and waning  Associated symptoms include congestion, coughing, fatigue, headaches and a sore throat  Pertinent negatives include no abdominal pain, anorexia, arthralgias, change in bowel habit, chest pain, chills, diaphoresis, fever, joint swelling, myalgias, nausea, neck pain, numbness, rash, swollen glands, urinary symptoms, vertigo, visual change, vomiting or weakness  The symptoms are aggravated by exertion and coughing  Treatments tried: zpak  The treatment provided mild relief         The following portions of the patient's history were reviewed and updated as appropriate: allergies, current medications, past family history, past medical history, past social history, past surgical history and problem list       Current Outpatient Prescriptions:     amLODIPine-benazepril (LOTREL 5-10) 5-10 MG per capsule, TAKE ONE CAPSULE BY MOUTH EVERY DAY, Disp: 90 capsule, Rfl: 1    levofloxacin (LEVAQUIN) 500 mg tablet, Take 1 tablet (500 mg total) by mouth every 24 hours for 10 days Take with food, Disp: 10 tablet, Rfl: 0      Review of Systems   Constitutional: Positive for fatigue  Negative for appetite change, chills, diaphoresis, fever and weight loss  HENT: Positive for congestion, postnasal drip, sinus pain, sinus pressure and sore throat  Negative for drooling, ear pain, hearing loss, rhinorrhea and tinnitus  Eyes: Negative for pain, discharge, redness and itching  Respiratory: Positive for cough and chest tightness  Negative for hemoptysis, shortness of breath and wheezing  Cardiovascular: Negative for chest pain, palpitations and leg swelling  Gastrointestinal: Negative for abdominal pain, anorexia, change in bowel habit, diarrhea, heartburn, nausea and vomiting  Genitourinary: Negative for dysuria and flank pain  Musculoskeletal: Negative for arthralgias, joint swelling, myalgias and neck pain  Skin: Negative for rash  Neurological: Positive for headaches  Negative for dizziness, vertigo, weakness and numbness  Hematological: Negative for adenopathy  Psychiatric/Behavioral: Negative for confusion and dysphoric mood  The patient is not nervous/anxious  Objective:      /84 (BP Location: Right arm, Patient Position: Sitting, Cuff Size: Large)   Pulse 61   Temp 97 9 °F (36 6 °C) (Tympanic)   SpO2 98%          Physical Exam   Constitutional: He is oriented to person, place, and time  He appears well-developed and well-nourished  No distress  HENT:   Head: Normocephalic and atraumatic     Right Ear: External ear normal  Tympanic membrane is retracted  Left Ear: External ear normal  Tympanic membrane is retracted  Nose: Mucosal edema present  Right sinus exhibits maxillary sinus tenderness and frontal sinus tenderness  Left sinus exhibits maxillary sinus tenderness and frontal sinus tenderness  Mouth/Throat: Uvula is midline  Oropharyngeal exudate and posterior oropharyngeal erythema present  Eyes: Pupils are equal, round, and reactive to light  Conjunctivae and EOM are normal  Right eye exhibits no discharge  Left eye exhibits no discharge  No scleral icterus  Neck: Normal range of motion  Neck supple  No thyromegaly present  Cardiovascular: Normal rate, regular rhythm and normal heart sounds  No murmur heard  Pulmonary/Chest: Effort normal  No respiratory distress  He has decreased breath sounds (coarse BS b/l)  He has no wheezes  He has no rhonchi  He has no rales  Abdominal: Soft  Bowel sounds are normal  He exhibits no distension  There is no tenderness  Musculoskeletal: Normal range of motion  He exhibits no edema, tenderness or deformity  Lymphadenopathy:     He has no cervical adenopathy  Neurological: He is alert and oriented to person, place, and time  He has normal reflexes  No cranial nerve deficit  Coordination normal    Skin: Skin is warm and dry  No rash noted  No erythema  Psychiatric: He has a normal mood and affect  His behavior is normal    Vitals reviewed  Assessment/Plan:   Diagnoses and all orders for this visit:    Chronic sinusitis with recurrent bronchitis  -     levofloxacin (LEVAQUIN) 500 mg tablet; Take 1 tablet (500 mg total) by mouth every 24 hours for 10 days Take with food          38-year-old male presents with symptoms concerning for chronic sinusitis with her current bronchitis  Will empirically treat with 10 day course of oral Levaquin  Recommend supportive care with saltwater gargles and saline nasal spray    Patient to return if symptoms not improving in 2-3 days  Return if symptoms worsen or fail to improve  The patient indicates understanding of these issues and agrees with the plan          Olive Alvarado DO

## 2019-01-09 DIAGNOSIS — Z12.11 SCREENING FOR COLON CANCER: Primary | ICD-10-CM

## 2019-04-12 DIAGNOSIS — I10 ESSENTIAL HYPERTENSION: ICD-10-CM

## 2019-04-15 RX ORDER — AMLODIPINE BESYLATE AND BENAZEPRIL HYDROCHLORIDE 5; 10 MG/1; MG/1
CAPSULE ORAL
Qty: 90 CAPSULE | Refills: 0 | Status: SHIPPED | OUTPATIENT
Start: 2019-04-15 | End: 2019-07-15 | Stop reason: SDUPTHER

## 2019-06-27 ENCOUNTER — TELEPHONE (OUTPATIENT)
Dept: FAMILY MEDICINE CLINIC | Facility: OTHER | Age: 69
End: 2019-06-27

## 2019-07-14 DIAGNOSIS — I10 ESSENTIAL HYPERTENSION: ICD-10-CM

## 2019-07-15 DIAGNOSIS — I10 ESSENTIAL HYPERTENSION: ICD-10-CM

## 2019-07-15 RX ORDER — AMLODIPINE BESYLATE AND BENAZEPRIL HYDROCHLORIDE 5; 10 MG/1; MG/1
CAPSULE ORAL
Qty: 90 CAPSULE | Refills: 0 | OUTPATIENT
Start: 2019-07-15

## 2019-07-15 RX ORDER — AMLODIPINE BESYLATE AND BENAZEPRIL HYDROCHLORIDE 5; 10 MG/1; MG/1
1 CAPSULE ORAL DAILY
Qty: 90 CAPSULE | Refills: 0 | Status: SHIPPED | OUTPATIENT
Start: 2019-07-15 | End: 2019-08-12 | Stop reason: SDUPTHER

## 2019-07-15 NOTE — TELEPHONE ENCOUNTER
The BP medication is refilled today  Please set up a visit for patient  Hasn't been seen and due for blood work as well  I will order labs when he is seen  Thanks

## 2019-08-12 ENCOUNTER — OFFICE VISIT (OUTPATIENT)
Dept: FAMILY MEDICINE CLINIC | Facility: OTHER | Age: 69
End: 2019-08-12
Payer: COMMERCIAL

## 2019-08-12 VITALS
TEMPERATURE: 98.1 F | SYSTOLIC BLOOD PRESSURE: 114 MMHG | WEIGHT: 199.13 LBS | HEART RATE: 69 BPM | HEIGHT: 68 IN | DIASTOLIC BLOOD PRESSURE: 64 MMHG | BODY MASS INDEX: 30.18 KG/M2 | OXYGEN SATURATION: 96 %

## 2019-08-12 DIAGNOSIS — Z12.11 SCREENING FOR COLON CANCER: Primary | ICD-10-CM

## 2019-08-12 DIAGNOSIS — Z23 ENCOUNTER FOR IMMUNIZATION: ICD-10-CM

## 2019-08-12 DIAGNOSIS — N52.9 ERECTILE DYSFUNCTION, UNSPECIFIED ERECTILE DYSFUNCTION TYPE: ICD-10-CM

## 2019-08-12 DIAGNOSIS — E78.5 DYSLIPIDEMIA: ICD-10-CM

## 2019-08-12 DIAGNOSIS — I10 ESSENTIAL HYPERTENSION: ICD-10-CM

## 2019-08-12 DIAGNOSIS — I10 BENIGN ESSENTIAL HYPERTENSION: ICD-10-CM

## 2019-08-12 DIAGNOSIS — Z12.5 SCREENING FOR PROSTATE CANCER: ICD-10-CM

## 2019-08-12 PROBLEM — J20.9 ACUTE BRONCHITIS: Status: RESOLVED | Noted: 2017-10-02 | Resolved: 2019-08-12

## 2019-08-12 PROCEDURE — 1125F AMNT PAIN NOTED PAIN PRSNT: CPT | Performed by: FAMILY MEDICINE

## 2019-08-12 PROCEDURE — G0439 PPPS, SUBSEQ VISIT: HCPCS | Performed by: FAMILY MEDICINE

## 2019-08-12 PROCEDURE — 1160F RVW MEDS BY RX/DR IN RCRD: CPT | Performed by: FAMILY MEDICINE

## 2019-08-12 PROCEDURE — 3008F BODY MASS INDEX DOCD: CPT | Performed by: FAMILY MEDICINE

## 2019-08-12 PROCEDURE — 1036F TOBACCO NON-USER: CPT | Performed by: FAMILY MEDICINE

## 2019-08-12 PROCEDURE — 3725F SCREEN DEPRESSION PERFORMED: CPT | Performed by: FAMILY MEDICINE

## 2019-08-12 PROCEDURE — 1170F FXNL STATUS ASSESSED: CPT | Performed by: FAMILY MEDICINE

## 2019-08-12 PROCEDURE — 3074F SYST BP LT 130 MM HG: CPT | Performed by: FAMILY MEDICINE

## 2019-08-12 PROCEDURE — 99213 OFFICE O/P EST LOW 20 MIN: CPT | Performed by: FAMILY MEDICINE

## 2019-08-12 RX ORDER — AMLODIPINE BESYLATE AND BENAZEPRIL HYDROCHLORIDE 5; 10 MG/1; MG/1
1 CAPSULE ORAL DAILY
Qty: 90 CAPSULE | Refills: 3 | Status: SHIPPED | OUTPATIENT
Start: 2019-08-12 | End: 2020-12-01 | Stop reason: SDUPTHER

## 2019-08-12 RX ORDER — SILDENAFIL 25 MG/1
25 TABLET, FILM COATED ORAL DAILY PRN
Qty: 10 TABLET | Refills: 0 | Status: SHIPPED | OUTPATIENT
Start: 2019-08-12 | End: 2019-12-26 | Stop reason: SDUPTHER

## 2019-08-12 NOTE — PATIENT INSTRUCTIONS
Obesity   AMBULATORY CARE:   Obesity  is when your body mass index (BMI) is greater than 30  Your healthcare provider will use your height and weight to measure your BMI  The risks of obesity include  many health problems, such as injuries or physical disability  You may need tests to check for the following:  · Diabetes     · High blood pressure or high cholesterol     · Heart disease     · Gallbladder or liver disease     · Cancer of the colon, breast, prostate, liver, or kidney     · Sleep apnea     · Arthritis or gout  Seek care immediately if:   · You have a severe headache, confusion, or difficulty speaking  · You have weakness on one side of your body  · You have chest pain, sweating, or shortness of breath  Contact your healthcare provider if:   · You have symptoms of gallbladder or liver disease, such as pain in your upper abdomen  · You have knee or hip pain and discomfort while walking  · You have symptoms of diabetes, such as intense hunger and thirst, and frequent urination  · You have symptoms of sleep apnea, such as snoring or daytime sleepiness  · You have questions or concerns about your condition or care  Treatment for obesity  focuses on helping you lose weight to improve your health  Even a small decrease in BMI can reduce the risk for many health problems  Your healthcare provider will help you set a weight-loss goal   · Lifestyle changes  are the first step in treating obesity  These include making healthy food choices and getting regular physical activity  Your healthcare provider may suggest a weight-loss program that involves coaching, education, and therapy  · Medicine  may help you lose weight when it is used with a healthy diet and physical activity  · Surgery  can help you lose weight if you are very obese and have other health problems  There are several types of weight-loss surgery  Ask your healthcare provider for more information    Be successful losing weight:   · Set small, realistic goals  An example of a small goal is to walk for 20 minutes 5 days a week  Anther goal is to lose 5% of your body weight  · Tell friends, family members, and coworkers about your goals  and ask for their support  Ask a friend to lose weight with you, or join a weight-loss support group  · Identify foods or triggers that may cause you to overeat , and find ways to avoid them  Remove tempting high-calorie foods from your home and workplace  Place a bowl of fresh fruit on your kitchen counter  If stress causes you to eat, then find other ways to cope with stress  · Keep a diary to track what you eat and drink  Also write down how many minutes of physical activity you do each day  Weigh yourself once a week and record it in your diary  Eating changes: You will need to eat 500 to 1,000 fewer calories each day than you currently eat to lose 1 to 2 pounds a week  The following changes will help you cut calories:  · Eat smaller portions  Use small plates, no larger than 9 inches in diameter  Fill your plate half full of fruits and vegetables  Measure your food using measuring cups until you know what a serving size looks like  · Eat 3 meals and 1 or 2 snacks each day  Plan your meals in advance  [place] and eat at home most of the time  Eat slowly  · Eat fruits and vegetables at every meal   They are low in calories and high in fiber, which makes you feel full  Do not add butter, margarine, or cream sauce to vegetables  Use herbs to season steamed vegetables  · Eat less fat and fewer fried foods  Eat more baked or grilled chicken and fish  These protein sources are lower in calories and fat than red meat  Limit fast food  Dress your salads with olive oil and vinegar instead of bottled dressing  · Limit the amount of sugar you eat  Do not drink sugary beverages  Limit alcohol  Activity changes:  Physical activity is good for your body in many ways   It helps you burn calories and build strong muscles  It decreases stress and depression, and improves your mood  It can also help you sleep better  Talk to your healthcare provider before you begin an exercise program   · Exercise for at least 30 minutes 5 days a week  Start slowly  Set aside time each day for physical activity that you enjoy and that is convenient for you  It is best to do both weight training and an activity that increases your heart rate, such as walking, bicycling, or swimming  · Find ways to be more active  Do yard work and housecleaning  Walk up the stairs instead of using elevators  Spend your leisure time going to events that require walking, such as outdoor festivals or fairs  This extra physical activity can help you lose weight and keep it off  Follow up with your healthcare provider as directed: You may need to meet with a dietitian  Write down your questions so you remember to ask them during your visits  © 2017 [address] [employer] Information is for End User's use only and may not be sold, redistributed or otherwise used for commercial purposes  All illustrations and images included in CareNotes® are the copyrighted property of [employer]  or Steven Jarquin  The above information is an  only  It is not intended as medical advice for individual conditions or treatments  Talk to your doctor, nurse or pharmacist before following any medical regimen to see if it is safe and effective for you  Urinary Incontinence   WHAT YOU NEED TO KNOW:   What is urinary incontinence? Urinary incontinence (UI) is when you lose control of your bladder  What causes UI? UI occurs because your bladder cannot store or empty urine properly  The following are the most common types of UI:  · Stress incontinence  is when you leak urine due to increased bladder pressure  This may happen when you cough, sneeze, or exercise       · Urge incontinence  is when you feel the need to urinate right away and leak urine accidentally  · Mixed incontinence  is when you have both stress and urge UI  What are the signs and symptoms of UI?   · You feel like your bladder does not empty completely when you urinate  · You urinate often and need to urinate immediately  · You leak urine when you sleep, or you wake up with the urge to urinate  · You leak urine when you cough, sneeze, exercise, or laugh  How is UI diagnosed? Your healthcare provider will ask how often you leak urine and whether you have stress or urge symptoms  Tell him which medicines you take, how often you urinate, and how much liquid you drink each day  You may need any of the following tests:  · Urine tests  may show infection or kidney function  · A pelvic exam  may be done to check for blockages  A pelvic exam will also show if your bladder, uterus, or other organs have moved out of place  · An x-ray, ultrasound, or CT  may show problems with parts of your urinary system  You may be given contrast liquid to help your organs show up better in the pictures  Tell the healthcare provider if you have ever had an allergic reaction to contrast liquid  Do not enter the MRI room with anything metal  Metal can cause serious injury  Tell the healthcare provider if you have any metal in or on your body  · A bladder scan  will show how much urine is left in your bladder after you urinate  You will be asked to urinate and then healthcare providers will use a small ultrasound machine to check the urine left in your bladder  · Cystometry  is used to check the function of your urinary system  Your healthcare provider checks the pressure in your bladder while filling it with fluid  Your bladder pressure may also be tested when your bladder is full and while you urinate  How is UI treated? · Medicines  can help strengthen your bladder control      · Electrical stimulation  is used to send a small amount of electrical energy to your pelvic floor muscles  This helps control your bladder function  Electrodes may be placed outside your body or in your rectum  For women, the electrodes may be placed in the vagina  · A bulking agent  may be injected into the wall of your urethra to make it thicker  This helps keep your urethra closed and decreases urine leakage  · Devices  such as a clamp, pessary, or tampon may help stop urine leaks  Ask your healthcare provider for more information about these and other devices  · Surgery  may be needed if other treatments do not work  Several types of surgery can help improve your bladder control  Ask your healthcare provider for more information about the surgery you may need  How can I manage my symptoms? · Do pelvic muscle exercises often  Your pelvic muscles help you stop urinating  Squeeze these muscles tight for 5 seconds, then relax for 5 seconds  Gradually work up to squeezing for 10 seconds  Do 3 sets of 15 repetitions a day, or as directed  This will help strengthen your pelvic muscles and improve bladder control  · A catheter  may be used to help empty your bladder  A catheter is a tiny, plastic tube that is put into your bladder to drain your urine  Your healthcare provider may tell you to use a catheter to prevent your bladder from getting too full and leaking urine  · Keep a UI record  Write down how often you leak urine and how much you leak  Make a note of what you were doing when you leaked urine  · Train your bladder  Go to the bathroom at set times, such as every 2 hours, even if you do not feel the urge to go  You can also try to hold your urine when you feel the urge to go  For example, hold your urine for 5 minutes when you feel the urge to go  As that becomes easier, hold your urine for 10 minutes  · Drink liquids as directed  Ask your healthcare provider how much liquid to drink each day and which liquids are best for you   You may need to limit the amount of liquid you drink to help control your urine leakage  Limit or do not have drinks that contain caffeine or alcohol  Do not drink any liquid right before you go to bed  · Prevent constipation  Eat a variety of high-fiber foods  Good examples are high-fiber cereals, beans, vegetables, and whole-grain breads  Prune juice may help make your bowel movement softer  Walking is the best way to trigger your intestines to have a bowel movement  · Exercise regularly and maintain a healthy weight  Ask your healthcare provider how much you should weigh and about the best exercise plan for you  Weight loss and exercise will decrease pressure on your bladder and help you control your leakage  Ask him to help you create a weight loss plan if you are overweight  When should I seek immediate care? · You have severe pain  · You are confused or cannot think clearly  When should I contact my healthcare provider? · You have a fever  · You see blood in your urine  · You have pain when you urinate  · You have new or worse pain, even after treatment  · Your mouth feels dry or you have vision changes  · Your urine is cloudy or smells bad  · You have questions or concerns about your condition or care  CARE AGREEMENT:   You have the right to help plan your care  Learn about your health condition and how it may be treated  Discuss treatment options with your caregivers to decide what care you want to receive  You always have the right to refuse treatment  The above information is an  only  It is not intended as medical advice for individual conditions or treatments  Talk to your doctor, nurse or pharmacist before following any medical regimen to see if it is safe and effective for you  © 2017 [address] [employer] Information is for End User's use only and may not be sold, redistributed or otherwise used for commercial purposes   All illustrations and images included in CareNotes® are the copyrighted property of [employer]  or Steven Jarquin  Cigarette Smoking and Your Health   AMBULATORY CARE:   Risks to your health if you smoke:  Nicotine and other chemicals found in tobacco damage every cell in your body  Even if you are a light smoker, you have an increased risk for cancer, heart disease, and lung disease  If you are pregnant or have diabetes, smoking increases your risk for complications  Benefits to your health if you stop smoking:   · You decrease respiratory symptoms such as coughing, wheezing, and shortness of breath  · You reduce your risk for cancers of the lung, mouth, throat, kidney, bladder, pancreas, stomach, and cervix  If you already have cancer, you increase the benefits of chemotherapy  You also reduce your risk for cancer returning or a second cancer from developing  · You reduce your risk for heart disease, blood clots, heart attack, and stroke  · You reduce your risk for lung infections, and diseases such as pneumonia, asthma, chronic bronchitis, and emphysema  · Your circulation improves  More oxygen can be delivered to your body  If you have diabetes, you lower your risk for complications, such as kidney, artery, and eye diseases  You also lower your risk for nerve damage  Nerve damage can lead to amputations, poor vision, and blindness  · You improve your body's ability to heal and to fight infections  Benefits to the health of others if you stop smoking:  Tobacco is harmful to nonsmokers who breathe in your secondhand smoke  The following are ways the health of others around you may improve when you stop smoking:  · You lower the risks for lung cancer and heart disease in nonsmoking adults  · If you are pregnant, you lower the risk for miscarriage, early delivery, low birth weight, and stillbirth  You also lower your baby's risk for SIDS, obesity, developmental delay, and neurobehavioral problems, such as ADHD  · If you have children, you lower their risk for ear infections, colds, pneumonia, bronchitis, and asthma  For more information and support to stop smoking:   · Smokefree  gov  Phone: 1- 523 - 904-2118  Web Address: www smokefrPlaceling  Follow up with your healthcare provider as directed:  Write down your questions so you remember to ask them during your visits  © 2017 [address] [employer] Information is for End User's use only and may not be sold, redistributed or otherwise used for commercial purposes  All illustrations and images included in CareNotes® are the copyrighted property of [employer]  or Steven Jarquin  The above information is an  only  It is not intended as medical advice for individual conditions or treatments  Talk to your doctor, nurse or pharmacist before following any medical regimen to see if it is safe and effective for you  Fall Prevention   WHAT YOU NEED TO KNOW:   What is fall prevention? Fall prevention includes ways to make your home and other areas safer  It also includes ways you can move more carefully to prevent a fall  What increases my risk for falls? · Lack of vitamin D    · Not getting enough sleep each night    · Trouble walking or keeping your balance, or foot problems    · Health conditions that cause changes in your blood pressure, vision, or muscle strength and coordination    · Medicines that make you dizzy, weak, or sleepy    · Problems seeing clearly    · Shoes that have high heels or are not supportive    · Tripping hazards, such as items left on the floor, no handrails on the stairs, or broken steps  How can I help protect myself from falls? · Stand or sit up slowly  This may help you keep your balance and prevent falls  If you need to get up during the night, sit up first  Be sure you are fully awake before you stand  Turn on the light before you start walking  Go slowly in case you are still sleepy   Make sure you will not trip over any pets sleeping in the bedroom  · Use assistive devices as directed  Your healthcare provider may suggest that you use a cane or walker to help you keep your balance  You may need to have grab bars put in your bathroom near the toilet or in the shower  · Wear shoes that fit well and have soles that   Wear shoes both inside and outside  Use slippers with good   Do not wear shoes with high heels  · Wear a personal alarm  This is a device that allows you to call 911 if you fall and need help  Ask your healthcare provider for more information  · Stay active  Exercise can help strengthen your muscles and improve your balance  Your healthcare provider may recommend water aerobics or walking  He or she may also recommend physical therapy to improve your coordination  Never start an exercise program without talking to your healthcare provider first      · Manage medical conditions  Keep all appointments with your healthcare providers  Visit your eye doctor as directed  How can I make my home safer? · Add items to prevent falls in the bathroom  Put nonslip strips on your bath or shower floor to prevent you from slipping  Use a bath mat if you do not have carpet in the bathroom  This will prevent you from falling when you step out of the bath or shower  Use a shower seat so you do not need to stand while you shower  Sit on the toilet or a chair in your bathroom to dry yourself and put on clothing  This will prevent you from losing your balance from drying or dressing yourself while you are standing  · Keep paths clear  Remove books, shoes, and other objects from walkways and stairs  Place cords for telephones and lamps out of the way so that you do not need to walk over them  Tape them down if you cannot move them  Remove small rugs  If you cannot remove a rug, secure it with double-sided tape  This will prevent you from tripping  · Install bright lights in your home  Use night lights to help light paths to the bathroom or kitchen  Always turn on the light before you start walking  · Keep items you use often on shelves within reach  Do not use a step stool to help you reach an item  · Paint or place reflective tape on the edges of your stairs  This will help you see the stairs better  Call 911 or have someone else call if:   · You have fallen and are unconscious  · You have fallen and cannot move part of your body  Contact your healthcare provider if:   · You have fallen and have pain or a headache  · You have questions or concerns about your condition or care  CARE AGREEMENT:   You have the right to help plan your care  Learn about your health condition and how it may be treated  Discuss treatment options with your caregivers to decide what care you want to receive  You always have the right to refuse treatment  The above information is an  only  It is not intended as medical advice for individual conditions or treatments  Talk to your doctor, nurse or pharmacist before following any medical regimen to see if it is safe and effective for you  © 2017 [address] [facility] Information is for End User's use only and may not be sold, redistributed or otherwise used for commercial purposes  All illustrations and images included in CareNotes® are the copyrighted property of [employer]  or Steven Jarquin  Advance Directives   WHAT YOU NEED TO KNOW:   What are advance directives? Advance directives are legal documents that state your wishes and plans for medical care  These plans are made ahead of time in case you lose your ability to make decisions for yourself  Advance directives can apply to any medical decision, such as the treatments you want, and if you want to donate organs  What are the types of advance directives? There are many types of advance directives, and each state has rules about how to use them   You may choose a combination of any of the following:  · Living will: This is a written record of the treatment you want  You can also choose which treatments you do not want, which to limit, and which to stop at a certain time  This includes surgery, medicine, IV fluid, and tube feedings  · Durable power of  for healthcare [facility]): This is a written record that states who you want to make healthcare choices for you when you are unable to make them for yourself  This person, called a proxy, is usually a family member or a friend  You may choose more than 1 proxy  · Do not resuscitate (DNR) order:  A DNR order is used in case your heart stops beating or you stop breathing  It is a request not to have certain forms of treatment, such as CPR  A DNR order may be included in other types of advance directives  · Medical directive: This covers the care that you want if you are in a coma, near death, or unable to make decisions for yourself  You can list the treatments you want for each condition  Treatment may include pain medicine, surgery, blood transfusions, dialysis, IV or tube feedings, and a ventilator (breathing machine)  · Values history: This document has questions about your views, beliefs, and how you feel and think about life  This information can help others choose the care that you would choose  Why are advance directives important? An advance directive helps you control your care  Although spoken wishes may be used, it is better to have your wishes written down  Spoken wishes can be misunderstood, or not followed  Treatments may be given even if you do not want them  An advance directive may make it easier for your family to make difficult choices about your care  How do I decide what to put in my advance directives? · Make informed decisions:  Make sure you fully understand treatments or care you may receive   Think about the benefits and problems your decisions could cause for you or your family  Talk to healthcare providers if you have concerns or questions before you write down your wishes  You may also want to talk with your [employer] or , or a   Check your state laws to make sure that what you put in your advance directive is legal      · Sign all forms:  Sign and date your advance directive when you have finished  You may also need 2 witnesses to sign the forms  Witnesses cannot be your doctor or his staff, your spouse, heirs or beneficiaries, people you owe money to, or your chosen proxy  Talk to your family, proxy, and healthcare providers about your advance directive  Give each person a copy, and keep one for yourself in a place you can get to easily  Do not keep it hidden or locked away  · Review and revise your plans: You can revise your advance directive at any time, as long as you are able to make decisions  Review your plan every year, and when there are changes in your life, or your health  When you make changes, let your family, proxy, and healthcare providers know  Give each a new copy  Where can I find more information? · [employer]  [address] , [address]  Phone: 5- 925 - 831-4306  Phone: 7- 593 - [phone]  Web Address: http://www  aafp org  · [address] [facility]  [address] [facility], [address]  Phone: 1- 402 - [phone]  Phone: [phone]  Web Address: [place]  CARE AGREEMENT:   You have the right to help plan your care  To help with this plan, you must learn about your health condition and treatment options  You must also learn about advance directives and how they are used  Work with your healthcare providers to decide what care will be used to treat you  You always have the right to refuse treatment  The above information is an  only   It is not intended as medical advice for individual conditions or treatments  Talk to your doctor, nurse or pharmacist before following any medical regimen to see if it is safe and effective for you  © 2017 [address] [employer] Information is for End User's use only and may not be sold, redistributed or otherwise used for commercial purposes  All illustrations and images included in CareNotes® are the copyrighted property of [employer]  or Steven Jarquin

## 2019-08-12 NOTE — PROGRESS NOTES
Assessment and Plan:     Problem List Items Addressed This Visit        Cardiovascular and Mediastinum    Benign essential hypertension    Relevant Medications    amLODIPine-benazepril (LOTREL 5-10) 5-10 MG per capsule    Other Relevant Orders    CBC and differential    Comprehensive metabolic panel    High sensitivity CRP    Lipid panel    TSH, 3rd generation with Free T4 reflex    UA w Reflex to Microscopic w Reflex to Culture -Lab Collect      Other Visit Diagnoses     Screening for colon cancer    -  Primary    Relevant Orders    Ambulatory referral to Gastroenterology    Essential hypertension        Relevant Medications    amLODIPine-benazepril (LOTREL 5-10) 5-10 MG per capsule    Other Relevant Orders    CBC and differential    Comprehensive metabolic panel    High sensitivity CRP    Lipid panel    TSH, 3rd generation with Free T4 reflex    UA w Reflex to Microscopic w Reflex to Culture -Lab Collect    Screening for prostate cancer        Relevant Orders    PSA, Total Screen    Dyslipidemia        Relevant Orders    CBC and differential    Comprehensive metabolic panel    High sensitivity CRP    Lipid panel    TSH, 3rd generation with Free T4 reflex    UA w Reflex to Microscopic w Reflex to Culture -Lab Collect    Encounter for immunization        Relevant Orders    Zoster Vaccine Recombinant IM    Erectile dysfunction, unspecified erectile dysfunction type        Relevant Medications    sildenafil (VIAGRA) 25 MG tablet         History of Present Illness:     Patient presents for Medicare Annual Wellness visit    Patient Care Team:  Mary Morales MD as PCP - General (Family Medicine)     Problem List:     Patient Active Problem List   Diagnosis    Benign essential hypertension    Diarrhea    Lower back pain    Rib pain on left side    Epistaxis      Past Medical and Surgical History:     Past Medical History:   Diagnosis Date    Cataract, right eye     last assessed 04/21/2015    Hypertension  Pneumonia      Past Surgical History:   Procedure Laterality Date    CATARACT EXTRACTION, BILATERAL      MYRINGOTOMY W/ TUBES        Family History:     Family History   Problem Relation Age of Onset    Hypertension Father     No Known Problems Mother       Social History:     Social History     Tobacco Use   Smoking Status Never Smoker   Smokeless Tobacco Never Used     Social History     Substance and Sexual Activity   Alcohol Use Yes    Frequency: 2-3 times a week     Social History     Substance and Sexual Activity   Drug Use No      Medications and Allergies:     Current Outpatient Medications   Medication Sig Dispense Refill    amLODIPine-benazepril (LOTREL 5-10) 5-10 MG per capsule Take 1 capsule by mouth daily 90 capsule 3    sildenafil (VIAGRA) 25 MG tablet Take 1 tablet (25 mg total) by mouth daily as needed for erectile dysfunction 10 tablet 0     No current facility-administered medications for this visit  No Known Allergies   Immunizations:     Immunization History   Administered Date(s) Administered    INFLUENZA 10/27/2015, 10/26/2016, 11/06/2018    Influenza Quadrivalent Preservative Free 3 years and older IM 10/21/2014    Influenza Split High Dose Preservative Free IM 10/27/2015, 10/26/2016    Influenza TIV (IM) 01/15/2013, 10/29/2013    Pneumococcal Conjugate 13-Valent 08/17/2017    Pneumococcal Polysaccharide PPV23 05/16/2018    Td (adult), adsorbed 10/31/2000    Tdap 01/24/2008      Medicare Screening Tests and Risk Assessments:     Luke Fernandez is here for his Subsequent Wellness visit  Health Risk Assessment:  Patient rates overall health as very good  Patient feels that their physical health rating is Same  Eyesight was rated as Same  Hearing was rated as Slightly worse  Patient feels that their emotional and mental health rating is Same  Pain experienced by patient in the last 7 days has been None       Emotional/Mental Health:  Patient has not been feeling nervous/anxious  PHQ-9 Depression Screening:    Frequency of the following problems over the past two weeks:      1  Little interest or pleasure in doing things: 0 - not at all      2  Feeling down, depressed, or hopeless: 0 - not at all  PHQ-2 Score: 0          Broken Bones/Falls: Fall Risk Assessment:    In the past year, patient has experienced: No history of falling in past year          Bladder/Bowel:  Patient has not leaked urine accidently in the last six months  Patient reports no loss of bowel control  Immunizations:  Patient has had a flu vaccination within the last year  Patient has received a pneumonia shot  Patient has not received a shingles shot  Patient has received tetanus/diphtheria shot  (Additional Comments: Rx for shingrix vaccine provided for him to obtain at the pharmacy   )    Home Safety:  Patient does not have trouble with stairs inside or outside of their home  Patient currently reports that there are no safety hazards present in home, working smoke alarms, working carbon monoxide detectors  Preventative Screenings:   prostate cancer screen performed, no colon cancer screen completed, no cholesterol screen completed, glaucoma eye exam completed,     Nutrition:  Current diet: Regular and Limited junk food with servings of the following:    Medications:  Patient is not currently taking any over-the-counter supplements  Patient is able to manage medications  Lifestyle Choices:  Patient reports no tobacco use  Patient has not smoked or used tobacco in the past   Patient reports alcohol use  Patient drives a vehicle  Patient wears seat belt          Activities of Daily Living:  Can get out of bed by his or her self, able to dress self, able to make own meals, able to do own shopping, able to bathe self, can do own laundry/housekeeping, can manage own money, pay bills and track expenses    Previous Hospitalizations:  No hospitalization or ED visit in past 12 months        Advanced Directives:  Patient has decided on a power of   Patient has spoken to designated power of   Preventative Screening/Counseling:      Cardiovascular:      General: Risks and Benefits Discussed      Counseling: Healthy Diet and Healthy Weight     Due for Labs/Analytes/Optional EKG: Lipid Panel          Diabetes:      General: Risks and Benefits Discussed      Counseling: Healthy Diet, Healthy Weight and Improve Physical Activity          Colorectal Cancer:      General: Risks and Benefits Discussed and Screening Current      Counseling: high fiber diet      Comments: Referral is placed for colonoscopy with patient agreement  Prostate Cancer:      General: Risks and Benefits Discussed      Due for labs: PSA      Comments: Last PSA is at 0 4 2 years ago  No symptoms  Osteoporosis:      General: Risks and Benefits Discussed and Screening Current      Counseling: Regular Weightbearing Exercise      Comments: Dexa scan ordered  AAA:      General: Risks and Benefits Discussed and Screening Not Indicated          Glaucoma:      General: Risks and Benefits Discussed and Screening Current      Comments: Patient is due for an appointment  Has ophthalmology and will call for a visit  HIV:      General: Risks and Benefits Discussed and Patient Declines          Hepatitis C:      General: Risks and Benefits Discussed and Screening Current        Advanced Directives:   Patient has living will for healthcare, has durable POA for healthcare, patient has an advanced directive  Information on ACP and/or AD provided  5 wishes given  End of life assessment reviewed with patient  Provider agrees with end of life decisions   Immunizations: Additional Comments: Is due for Shingrix and referred to pharmacy dur to insurance coverage      Other Preventative Counseling (Non-Medicare):  Alcohol Use, Fall Prevention, Increase physical activity, Nutrition Counseling, Car/seat belt/driving safety reviewed, Skin self-exam and Sunscreen use            BMI Counseling: Body mass index is 30 28 kg/m²  Discussed the patient's BMI with him  The BMI is above average  BMI counseling and education was provided to the patient  Nutrition recommendations include reducing portion sizes, reducing fast food intake, moderation in carbohydrate intake, increasing intake of lean protein and reducing intake of cholesterol         PHQ-9 Depression Screening    PHQ-9:    Frequency of the following problems over the past two weeks:       Little interest or pleasure in doing things:  0 - not at all  Feeling down, depressed, or hopeless:  0 - not at all  PHQ-2 Score:  0

## 2019-08-12 NOTE — PROGRESS NOTES
Assessment/Plan:           Problem List Items Addressed This Visit        Cardiovascular and Mediastinum    Benign essential hypertension    Relevant Medications    amLODIPine-benazepril (LOTREL 5-10) 5-10 MG per capsule refilled today  Low salt diet re-enforced  FU routine    Other Relevant Orders    CBC and differential    Comprehensive metabolic panel    High sensitivity CRP    Lipid panel    TSH, 3rd generation with Free T4 reflex    UA w Reflex to Microscopic w Reflex to Culture -Lab Collect      Other Visit Diagnoses                 Essential hypertension        Relevant Medications    amLODIPine-benazepril (LOTREL 5-10) 5-10 MG per capsule    Other Relevant Orders    CBC and differential    Comprehensive metabolic panel    High sensitivity CRP    Lipid panel    TSH, 3rd generation with Free T4 reflex    UA w Reflex to Microscopic w Reflex to Culture -Lab Collect                Dyslipidemia        Relevant Orders  Low fat diet and regular exercise and weight management recommended  Will recheck labs  CBC and differential    Comprehensive metabolic panel    High sensitivity CRP    Lipid panel    TSH, 3rd generation with Free T4 reflex    UA w Reflex to Microscopic w Reflex to Culture -Lab Collect            Subjective:      Patient ID: Reese Mao is a 77 y o  male  Hypertension   Patient is a 77 y o  male who presents for follow-up of hypertension  His high blood pressure was first noted many years ago  Home blood pressure readings: normal  Salt intake and diet: salt not added to cooking  Usual weight: stable  Associated signs and symptoms: none  Patient denies: blurred vision, chest pain, dyspnea, palpitations and peripheral edema  Use of agents associated with hypertension: none  Medication compliance: taking as prescribed  Hyperlipidemia  Reese Mao is a 77 y o  male who presents for follow-up of dyslipidemia  A repeat fasting lipid profile was done 2 years ago   The patient does not use medications that may worsen dyslipidemias (corticosteroids, progestins, anabolic steroids, diuretics, beta-blockers, amiodarone, cyclosporine, olanzapine)  Exercise: daily  Previous history of cardiac disease includes: none  He has not been on any cholesterol lowering medication and doesn't really watch his diet  Will recheck labs and decide on further management  Currently recommended to take low fat diet and continue with regular exercise and work on weight reduction  The following portions of the patient's history were reviewed and updated as appropriate:   He  has a past medical history of Cataract, right eye, Hypertension, and Pneumonia  He   Patient Active Problem List    Diagnosis Date Noted    Epistaxis 11/16/2018    Diarrhea 04/21/2015    Rib pain on left side 04/21/2015    Lower back pain 06/11/2014    Benign essential hypertension 11/30/2012     He  has a past surgical history that includes Cataract extraction, bilateral and Myringotomy w/ tubes  His family history includes Hypertension in his father; No Known Problems in his mother  He  reports that he has never smoked  He has never used smokeless tobacco  He reports that he drinks alcohol  He reports that he does not use drugs  Current Outpatient Medications   Medication Sig Dispense Refill    amLODIPine-benazepril (LOTREL 5-10) 5-10 MG per capsule Take 1 capsule by mouth daily 90 capsule 3     No current facility-administered medications for this visit  Current Outpatient Medications on File Prior to Visit   Medication Sig    [DISCONTINUED] amLODIPine-benazepril (LOTREL 5-10) 5-10 MG per capsule Take 1 capsule by mouth daily     No current facility-administered medications on file prior to visit  He has No Known Allergies       Review of Systems   Constitutional: Negative for appetite change, chills, fatigue and unexpected weight change  HENT: Negative for congestion  Eyes: Negative for visual disturbance  Respiratory: Negative for cough and shortness of breath  Cardiovascular: Negative for chest pain and palpitations  Gastrointestinal: Negative for abdominal pain, nausea and vomiting  Musculoskeletal: Negative for arthralgias, back pain and myalgias  Skin: Negative for pallor and rash  Objective:      /64 (BP Location: Left arm, Patient Position: Sitting, Cuff Size: Large)   Pulse 69   Temp 98 1 °F (36 7 °C) (Tympanic)   Ht 5' 8" (1 727 m)   Wt 90 3 kg (199 lb 2 oz)   SpO2 96%   BMI 30 28 kg/m²          Physical Exam   Constitutional: He appears well-developed and well-nourished  No distress  HENT:   Head: Normocephalic and atraumatic  Cardiovascular: Normal rate, regular rhythm, normal heart sounds and intact distal pulses  No murmur heard  Pulmonary/Chest: Effort normal and breath sounds normal  No respiratory distress  He has no wheezes  Abdominal: Soft  Bowel sounds are normal  He exhibits no distension  There is no tenderness  Skin: Skin is warm and dry  No rash noted  He is not diaphoretic  No erythema  No pallor  Psychiatric: He has a normal mood and affect  His behavior is normal    Nursing note and vitals reviewed        Lab Results   Component Value Date    TRIG 223 (H) 08/25/2017    HDL 41 08/25/2017    ALT 25 08/25/2017    AST 19 08/25/2017    K 3 7 08/25/2017     08/25/2017    CREATININE 0 96 08/25/2017    BUN 13 08/25/2017    CO2 25 08/25/2017    PSA 0 4 08/25/2017    GLUF 120 (H) 08/25/2017     No results found for: [id number], TSH

## 2019-08-19 ENCOUNTER — TELEPHONE (OUTPATIENT)
Dept: FAMILY MEDICINE CLINIC | Facility: OTHER | Age: 69
End: 2019-08-19

## 2019-12-26 DIAGNOSIS — N52.9 ERECTILE DYSFUNCTION, UNSPECIFIED ERECTILE DYSFUNCTION TYPE: ICD-10-CM

## 2019-12-26 RX ORDER — SILDENAFIL 25 MG/1
25 TABLET, FILM COATED ORAL DAILY PRN
Qty: 10 TABLET | Refills: 0 | Status: SHIPPED | OUTPATIENT
Start: 2019-12-26 | End: 2020-12-03

## 2020-04-20 ENCOUNTER — TELEMEDICINE (OUTPATIENT)
Dept: FAMILY MEDICINE CLINIC | Facility: OTHER | Age: 70
End: 2020-04-20
Payer: COMMERCIAL

## 2020-04-20 DIAGNOSIS — R05.9 COUGH: Primary | ICD-10-CM

## 2020-04-20 DIAGNOSIS — R07.89 CHEST PRESSURE: ICD-10-CM

## 2020-04-20 DIAGNOSIS — Z20.828 EXPOSURE TO SARS-ASSOCIATED CORONAVIRUS: ICD-10-CM

## 2020-04-20 PROCEDURE — 99213 OFFICE O/P EST LOW 20 MIN: CPT | Performed by: FAMILY MEDICINE

## 2020-04-20 PROCEDURE — 87635 SARS-COV-2 COVID-19 AMP PRB: CPT

## 2020-04-21 ENCOUNTER — HOSPITAL ENCOUNTER (OUTPATIENT)
Dept: RADIOLOGY | Facility: HOSPITAL | Age: 70
Discharge: HOME | End: 2020-04-21
Attending: FAMILY MEDICINE
Payer: COMMERCIAL

## 2020-04-21 ENCOUNTER — TELEMEDICINE (OUTPATIENT)
Dept: FAMILY MEDICINE CLINIC | Facility: OTHER | Age: 70
End: 2020-04-21
Payer: COMMERCIAL

## 2020-04-21 ENCOUNTER — TELEPHONE (OUTPATIENT)
Dept: FAMILY MEDICINE CLINIC | Facility: OTHER | Age: 70
End: 2020-04-21

## 2020-04-21 DIAGNOSIS — R05.9 COUGH: ICD-10-CM

## 2020-04-21 DIAGNOSIS — R05.9 COUGH: Primary | ICD-10-CM

## 2020-04-21 DIAGNOSIS — Z12.11 ENCOUNTER FOR SCREENING COLONOSCOPY: ICD-10-CM

## 2020-04-21 DIAGNOSIS — R07.89 CHEST PRESSURE: ICD-10-CM

## 2020-04-21 LAB — SARS-COV-2 RNA SPEC QL NAA+PROBE: NOT DETECTED

## 2020-04-21 PROCEDURE — 1160F RVW MEDS BY RX/DR IN RCRD: CPT | Performed by: FAMILY MEDICINE

## 2020-04-21 PROCEDURE — 71046 X-RAY EXAM CHEST 2 VIEWS: CPT

## 2020-04-21 PROCEDURE — 99212 OFFICE O/P EST SF 10 MIN: CPT | Performed by: FAMILY MEDICINE

## 2020-05-14 ENCOUNTER — TELEPHONE (OUTPATIENT)
Dept: GASTROENTEROLOGY | Facility: AMBULARY SURGERY CENTER | Age: 70
End: 2020-05-14

## 2020-10-06 DIAGNOSIS — I10 BENIGN ESSENTIAL HYPERTENSION: ICD-10-CM

## 2020-10-06 DIAGNOSIS — I10 ESSENTIAL HYPERTENSION: ICD-10-CM

## 2020-10-06 RX ORDER — AMLODIPINE BESYLATE AND BENAZEPRIL HYDROCHLORIDE 5; 10 MG/1; MG/1
CAPSULE ORAL
Qty: 90 CAPSULE | Refills: 3 | OUTPATIENT
Start: 2020-10-06

## 2020-11-23 DIAGNOSIS — I10 BENIGN ESSENTIAL HYPERTENSION: ICD-10-CM

## 2020-11-23 DIAGNOSIS — I10 ESSENTIAL HYPERTENSION: ICD-10-CM

## 2020-11-23 RX ORDER — AMLODIPINE BESYLATE AND BENAZEPRIL HYDROCHLORIDE 5; 10 MG/1; MG/1
CAPSULE ORAL
Qty: 90 CAPSULE | Refills: 3 | OUTPATIENT
Start: 2020-11-23

## 2020-11-24 RX ORDER — AMLODIPINE BESYLATE AND BENAZEPRIL HYDROCHLORIDE 5; 10 MG/1; MG/1
CAPSULE ORAL
Qty: 90 CAPSULE | Refills: 3 | OUTPATIENT
Start: 2020-11-24

## 2020-12-01 ENCOUNTER — TELEMEDICINE (OUTPATIENT)
Dept: FAMILY MEDICINE CLINIC | Facility: OTHER | Age: 70
End: 2020-12-01
Payer: COMMERCIAL

## 2020-12-01 DIAGNOSIS — I10 BENIGN ESSENTIAL HYPERTENSION: ICD-10-CM

## 2020-12-01 DIAGNOSIS — Z20.822 EXPOSURE TO COVID-19 VIRUS: Primary | ICD-10-CM

## 2020-12-01 DIAGNOSIS — I10 ESSENTIAL HYPERTENSION: ICD-10-CM

## 2020-12-01 DIAGNOSIS — Z20.822 EXPOSURE TO COVID-19 VIRUS: ICD-10-CM

## 2020-12-01 PROCEDURE — U0003 INFECTIOUS AGENT DETECTION BY NUCLEIC ACID (DNA OR RNA); SEVERE ACUTE RESPIRATORY SYNDROME CORONAVIRUS 2 (SARS-COV-2) (CORONAVIRUS DISEASE [COVID-19]), AMPLIFIED PROBE TECHNIQUE, MAKING USE OF HIGH THROUGHPUT TECHNOLOGIES AS DESCRIBED BY CMS-2020-01-R: HCPCS | Performed by: FAMILY MEDICINE

## 2020-12-01 PROCEDURE — 99213 OFFICE O/P EST LOW 20 MIN: CPT | Performed by: FAMILY MEDICINE

## 2020-12-01 RX ORDER — AMLODIPINE BESYLATE AND BENAZEPRIL HYDROCHLORIDE 5; 10 MG/1; MG/1
1 CAPSULE ORAL DAILY
Qty: 90 CAPSULE | Refills: 1 | Status: SHIPPED | OUTPATIENT
Start: 2020-12-01 | End: 2021-05-25 | Stop reason: SDUPTHER

## 2020-12-02 LAB — SARS-COV-2 RNA SPEC QL NAA+PROBE: NOT DETECTED

## 2020-12-04 ENCOUNTER — TELEMEDICINE (OUTPATIENT)
Dept: FAMILY MEDICINE CLINIC | Facility: OTHER | Age: 70
End: 2020-12-04
Payer: COMMERCIAL

## 2020-12-04 DIAGNOSIS — Z20.822 EXPOSURE TO COVID-19 VIRUS: Primary | ICD-10-CM

## 2020-12-04 DIAGNOSIS — R05.9 COUGH: ICD-10-CM

## 2020-12-04 PROCEDURE — 1036F TOBACCO NON-USER: CPT | Performed by: FAMILY MEDICINE

## 2020-12-04 PROCEDURE — 3725F SCREEN DEPRESSION PERFORMED: CPT | Performed by: FAMILY MEDICINE

## 2020-12-04 PROCEDURE — 99213 OFFICE O/P EST LOW 20 MIN: CPT | Performed by: FAMILY MEDICINE

## 2020-12-04 PROCEDURE — 1160F RVW MEDS BY RX/DR IN RCRD: CPT | Performed by: FAMILY MEDICINE

## 2020-12-04 RX ORDER — BENZONATATE 100 MG/1
100 CAPSULE ORAL 3 TIMES DAILY PRN
Qty: 20 CAPSULE | Refills: 0 | Status: SHIPPED | OUTPATIENT
Start: 2020-12-04 | End: 2020-12-14 | Stop reason: SDUPTHER

## 2020-12-14 DIAGNOSIS — R05.9 COUGH: ICD-10-CM

## 2020-12-14 RX ORDER — BENZONATATE 100 MG/1
100 CAPSULE ORAL 3 TIMES DAILY PRN
Qty: 20 CAPSULE | Refills: 0 | Status: SHIPPED | OUTPATIENT
Start: 2020-12-14 | End: 2021-02-08

## 2020-12-16 ENCOUNTER — VBI (OUTPATIENT)
Dept: ADMINISTRATIVE | Facility: OTHER | Age: 70
End: 2020-12-16

## 2021-02-08 ENCOUNTER — TELEMEDICINE (OUTPATIENT)
Dept: FAMILY MEDICINE CLINIC | Facility: OTHER | Age: 71
End: 2021-02-08
Payer: COMMERCIAL

## 2021-02-08 VITALS — WEIGHT: 190 LBS | HEIGHT: 68 IN | BODY MASS INDEX: 28.79 KG/M2

## 2021-02-08 DIAGNOSIS — E78.5 DYSLIPIDEMIA: ICD-10-CM

## 2021-02-08 DIAGNOSIS — Z00.00 MEDICARE ANNUAL WELLNESS VISIT, SUBSEQUENT: ICD-10-CM

## 2021-02-08 DIAGNOSIS — Z12.5 SCREENING FOR PROSTATE CANCER: ICD-10-CM

## 2021-02-08 DIAGNOSIS — I10 BENIGN ESSENTIAL HYPERTENSION: ICD-10-CM

## 2021-02-08 DIAGNOSIS — Z12.11 SCREENING FOR COLON CANCER: Primary | ICD-10-CM

## 2021-02-08 PROCEDURE — 1160F RVW MEDS BY RX/DR IN RCRD: CPT | Performed by: FAMILY MEDICINE

## 2021-02-08 PROCEDURE — 3288F FALL RISK ASSESSMENT DOCD: CPT | Performed by: FAMILY MEDICINE

## 2021-02-08 PROCEDURE — 1036F TOBACCO NON-USER: CPT | Performed by: FAMILY MEDICINE

## 2021-02-08 PROCEDURE — G0439 PPPS, SUBSEQ VISIT: HCPCS | Performed by: FAMILY MEDICINE

## 2021-02-08 PROCEDURE — 3725F SCREEN DEPRESSION PERFORMED: CPT | Performed by: FAMILY MEDICINE

## 2021-02-08 PROCEDURE — 3008F BODY MASS INDEX DOCD: CPT | Performed by: FAMILY MEDICINE

## 2021-02-08 PROCEDURE — 1101F PT FALLS ASSESS-DOCD LE1/YR: CPT | Performed by: FAMILY MEDICINE

## 2021-02-08 PROCEDURE — 1125F AMNT PAIN NOTED PAIN PRSNT: CPT | Performed by: FAMILY MEDICINE

## 2021-02-08 PROCEDURE — 1170F FXNL STATUS ASSESSED: CPT | Performed by: FAMILY MEDICINE

## 2021-02-08 NOTE — PATIENT INSTRUCTIONS
Medicare Preventive Visit Patient Instructions  Thank you for completing your Welcome to Medicare Visit or Medicare Annual Wellness Visit today  Your next wellness visit will be due in one year (2/8/2022)  The screening/preventive services that you may require over the next 5-10 years are detailed below  Some tests may not apply to you based off risk factors and/or age  Screening tests ordered at today's visit but not completed yet may show as past due  Also, please note that scanned in results may not display below  Preventive Screenings:  Service Recommendations Previous Testing/Comments   Colorectal Cancer Screening  · Colonoscopy    · Fecal Occult Blood Test (FOBT)/Fecal Immunochemical Test (FIT)  · Fecal DNA/Cologuard Test  · Flexible Sigmoidoscopy Age: 54-65 years old   Colonoscopy: every 10 years (May be performed more frequently if at higher risk)  OR  FOBT/FIT: every 1 year  OR  Cologuard: every 3 years  OR  Sigmoidoscopy: every 5 years  Screening may be recommended earlier than age 48 if at higher risk for colorectal cancer  Also, an individualized decision between you and your healthcare provider will decide whether screening between the ages of 74-80 would be appropriate  Colonoscopy: Not on file  FOBT/FIT: Not on file  Cologuard: Not on file  Sigmoidoscopy: Not on file         Prostate Cancer Screening Individualized decision between patient and health care provider in men between ages of 53-78   Medicare will cover every 12 months beginning on the day after your 50th birthday PSA: 0 4 ng/mL          Hepatitis C Screening Once for adults born between 1945 and 1965  More frequently in patients at high risk for Hepatitis C Hep C Antibody: 08/25/2017       Diabetes Screening 1-2 times per year if you're at risk for diabetes or have pre-diabetes Fasting glucose: 120 mg/dL   A1C: No results in last 5 years       Cholesterol Screening Once every 5 years if you don't have a lipid disorder   May order more often based on risk factors  Lipid panel: 08/25/2017          Other Preventive Screenings Covered by Medicare:  1  Abdominal Aortic Aneurysm (AAA) Screening: covered once if your at risk  You're considered to be at risk if you have a family history of AAA or a male between the age of 73-68 who smoking at least 100 cigarettes in your lifetime  2  Lung Cancer Screening: covers low dose CT scan once per year if you meet all of the following conditions: (1) Age 50-69; (2) No signs or symptoms of lung cancer; (3) Current smoker or have quit smoking within the last 15 years; (4) You have a tobacco smoking history of at least 30 pack years (packs per day x number of years you smoked); (5) You get a written order from a healthcare provider  3  Glaucoma Screening: covered annually if you're considered high risk: (1) You have diabetes OR (2) Family history of glaucoma OR (3)  aged 48 and older OR (3)  American aged 72 and older  3  Osteoporosis Screening: covered every 2 years if you meet one of the following conditions: (1) Have a vertebral abnormality; (2) On glucocorticoid therapy for more than 3 months; (3) Have primary hyperparathyroidism; (4) On osteoporosis medications and need to assess response to drug therapy  5  HIV Screening: covered annually if you're between the age of 12-76  Also covered annually if you are younger than 13 and older than 72 with risk factors for HIV infection  For pregnant patients, it is covered up to 3 times per pregnancy      Immunizations:  Immunization Recommendations   Influenza Vaccine Annual influenza vaccination during flu season is recommended for all persons aged >= 6 months who do not have contraindications   Pneumococcal Vaccine (Prevnar and Pneumovax)  * Prevnar = PCV13  * Pneumovax = PPSV23 Adults 25-60 years old: 1-3 doses may be recommended based on certain risk factors  Adults 72 years old: Prevnar (PCV13) vaccine recommended followed by Pneumovax (PPSV23) vaccine  If already received PPSV23 since turning 65, then PCV13 recommended at least one year after PPSV23 dose  Hepatitis B Vaccine 3 dose series if at intermediate or high risk (ex: diabetes, end stage renal disease, liver disease)   Tetanus (Td) Vaccine - COST NOT COVERED BY MEDICARE PART B Following completion of primary series, a booster dose should be given every 10 years to maintain immunity against tetanus  Td may also be given as tetanus wound prophylaxis  Tdap Vaccine - COST NOT COVERED BY MEDICARE PART B Recommended at least once for all adults  For pregnant patients, recommended with each pregnancy  Shingles Vaccine (Shingrix) - COST NOT COVERED BY MEDICARE PART B  2 shot series recommended in those aged 48 and above     Health Maintenance Due:      Topic Date Due    Colorectal Cancer Screening  12/09/1998    Hepatitis C Screening  Completed     Immunizations Due:      Topic Date Due    DTaP,Tdap,and Td Vaccines (2 - Td) 01/24/2018     Advance Directives   What are advance directives? Advance directives are legal documents that state your wishes and plans for medical care  These plans are made ahead of time in case you lose your ability to make decisions for yourself  Advance directives can apply to any medical decision, such as the treatments you want, and if you want to donate organs  What are the types of advance directives? There are many types of advance directives, and each state has rules about how to use them  You may choose a combination of any of the following:  · Living will: This is a written record of the treatment you want  You can also choose which treatments you do not want, which to limit, and which to stop at a certain time  This includes surgery, medicine, IV fluid, and tube feedings  · Durable power of  for healthcare [place] [facility]):   This is a written record that states who you want to make healthcare choices for you when you are unable to make them for yourself  This person, called a proxy, is usually a family member or a friend  You may choose more than 1 proxy  · Do not resuscitate (DNR) order:  A DNR order is used in case your heart stops beating or you stop breathing  It is a request not to have certain forms of treatment, such as CPR  A DNR order may be included in other types of advance directives  · Medical directive: This covers the care that you want if you are in a coma, near death, or unable to make decisions for yourself  You can list the treatments you want for each condition  Treatment may include pain medicine, surgery, blood transfusions, dialysis, IV or tube feedings, and a ventilator (breathing machine)  · Values history: This document has questions about your views, beliefs, and how you feel and think about life  This information can help others choose the care that you would choose  Why are advance directives important? An advance directive helps you control your care  Although spoken wishes may be used, it is better to have your wishes written down  Spoken wishes can be misunderstood, or not followed  Treatments may be given even if you do not want them  An advance directive may make it easier for your family to make difficult choices about your care  © Copyright [employer] 2018 Information is for End User's use only and may not be sold, redistributed or otherwise used for commercial purposes  All illustrations and images included in CareNotes® are the copyrighted property of [employer]  or [facility] Preventive Visit Patient Instructions  Thank you for completing your Welcome to Medicare Visit or Medicare Annual Wellness Visit today  Your next wellness visit will be due in one year (2/8/2022)  The screening/preventive services that you may require over the next 5-10 years are detailed below  Some tests may not apply to you based off risk factors and/or age   Screening tests ordered at today's visit but not completed yet may show as past due  Also, please note that scanned in results may not display below  Preventive Screenings:  Service Recommendations Previous Testing/Comments   Colorectal Cancer Screening  · Colonoscopy    · Fecal Occult Blood Test (FOBT)/Fecal Immunochemical Test (FIT)  · Fecal DNA/Cologuard Test  · Flexible Sigmoidoscopy Age: 54-65 years old   Colonoscopy: every 10 years (May be performed more frequently if at higher risk)  OR  FOBT/FIT: every 1 year  OR  Cologuard: every 3 years  OR  Sigmoidoscopy: every 5 years  Screening may be recommended earlier than age 48 if at higher risk for colorectal cancer  Also, an individualized decision between you and your healthcare provider will decide whether screening between the ages of 74-80 would be appropriate  Colonoscopy: Not on file  FOBT/FIT: Not on file  Cologuard: Not on file  Sigmoidoscopy: Not on file    Risks and Benefits Discussed     Prostate Cancer Screening Individualized decision between patient and health care provider in men between ages of 53-78   Medicare will cover every 12 months beginning on the day after your 50th birthday PSA: 0 4 ng/mL     Risks and Benefits Discussed     Hepatitis C Screening Once for adults born between 1945 and 1965  More frequently in patients at high risk for Hepatitis C Hep C Antibody: 08/25/2017    Screening Current   Diabetes Screening 1-2 times per year if you're at risk for diabetes or have pre-diabetes Fasting glucose: 120 mg/dL   A1C: No results in last 5 years    Risks and Benefits Discussed   Cholesterol Screening Once every 5 years if you don't have a lipid disorder  May order more often based on risk factors  Lipid panel: 08/25/2017    Screening Current      Other Preventive Screenings Covered by Medicare:  6  Abdominal Aortic Aneurysm (AAA) Screening: covered once if your at risk   You're considered to be at risk if you have a family history of AAA or a male between the age of 73-68 who smoking at least 100 cigarettes in your lifetime  7  Lung Cancer Screening: covers low dose CT scan once per year if you meet all of the following conditions: (1) Age 50-69; (2) No signs or symptoms of lung cancer; (3) Current smoker or have quit smoking within the last 15 years; (4) You have a tobacco smoking history of at least 30 pack years (packs per day x number of years you smoked); (5) You get a written order from a healthcare provider  8  Glaucoma Screening: covered annually if you're considered high risk: (1) You have diabetes OR (2) Family history of glaucoma OR (3)  aged 48 and older OR (3)  American aged 72 and older  5  Osteoporosis Screening: covered every 2 years if you meet one of the following conditions: (1) Have a vertebral abnormality; (2) On glucocorticoid therapy for more than 3 months; (3) Have primary hyperparathyroidism; (4) On osteoporosis medications and need to assess response to drug therapy  10  HIV Screening: covered annually if you're between the age of 12-76  Also covered annually if you are younger than 13 and older than 72 with risk factors for HIV infection  For pregnant patients, it is covered up to 3 times per pregnancy  Immunizations:  Immunization Recommendations   Influenza Vaccine Annual influenza vaccination during flu season is recommended for all persons aged >= 6 months who do not have contraindications   Pneumococcal Vaccine (Prevnar and Pneumovax)  * Prevnar = PCV13  * Pneumovax = PPSV23 Adults 25-60 years old: 1-3 doses may be recommended based on certain risk factors  Adults 72 years old: Prevnar (PCV13) vaccine recommended followed by Pneumovax (PPSV23) vaccine  If already received PPSV23 since turning 65, then PCV13 recommended at least one year after PPSV23 dose     Hepatitis B Vaccine 3 dose series if at intermediate or high risk (ex: diabetes, end stage renal disease, liver disease)   Tetanus (Td) Vaccine - COST NOT COVERED BY MEDICARE PART B Following completion of primary series, a booster dose should be given every 10 years to maintain immunity against tetanus  Td may also be given as tetanus wound prophylaxis  Tdap Vaccine - COST NOT COVERED BY MEDICARE PART B Recommended at least once for all adults  For pregnant patients, recommended with each pregnancy  Shingles Vaccine (Shingrix) - COST NOT COVERED BY MEDICARE PART B  2 shot series recommended in those aged 48 and above     Health Maintenance Due:      Topic Date Due    Colorectal Cancer Screening  12/09/1998    Hepatitis C Screening  Completed     Immunizations Due:      Topic Date Due    DTaP,Tdap,and Td Vaccines (2 - Td) 01/24/2018     Advance Directives   What are advance directives? Advance directives are legal documents that state your wishes and plans for medical care  These plans are made ahead of time in case you lose your ability to make decisions for yourself  Advance directives can apply to any medical decision, such as the treatments you want, and if you want to donate organs  What are the types of advance directives? There are many types of advance directives, and each state has rules about how to use them  You may choose a combination of any of the following:  · Living will: This is a written record of the treatment you want  You can also choose which treatments you do not want, which to limit, and which to stop at a certain time  This includes surgery, medicine, IV fluid, and tube feedings  · Durable power of  for healthcare [place] [facility]): This is a written record that states who you want to make healthcare choices for you when you are unable to make them for yourself  This person, called a proxy, is usually a family member or a friend  You may choose more than 1 proxy  · Do not resuscitate (DNR) order:  A DNR order is used in case your heart stops beating or you stop breathing  It is a request not to have certain forms of treatment, such as CPR   A DNR order may be included in other types of advance directives  · Medical directive: This covers the care that you want if you are in a coma, near death, or unable to make decisions for yourself  You can list the treatments you want for each condition  Treatment may include pain medicine, surgery, blood transfusions, dialysis, IV or tube feedings, and a ventilator (breathing machine)  · Values history: This document has questions about your views, beliefs, and how you feel and think about life  This information can help others choose the care that you would choose  Why are advance directives important? An advance directive helps you control your care  Although spoken wishes may be used, it is better to have your wishes written down  Spoken wishes can be misunderstood, or not followed  Treatments may be given even if you do not want them  An advance directive may make it easier for your family to make difficult choices about your care  © Copyright [employer] 2018 Information is for End User's use only and may not be sold, redistributed or otherwise used for commercial purposes   All illustrations and images included in CareNotes® are the copyrighted property of [employer]  or [address] [place] [address]

## 2021-02-08 NOTE — PROGRESS NOTES
Assessment and Plan:     Problem List Items Addressed This Visit        Cardiovascular and Mediastinum    Benign essential hypertension    Relevant Orders    CBC and differential    Comprehensive metabolic panel    TSH, 3rd generation with Free T4 reflex    Lipid panel    HEMOGLOBIN A1C W/ EAG ESTIMATION    UA w Reflex to Microscopic w Reflex to Culture -Lab Collect      Other Visit Diagnoses     Screening for colon cancer    -  Primary    Relevant Orders    Occult Blood, Fecal Immunochemical    Medicare annual wellness visit, subsequent        Dyslipidemia        Relevant Orders    CBC and differential    Comprehensive metabolic panel    TSH, 3rd generation with Free T4 reflex    Lipid panel    HEMOGLOBIN A1C W/ EAG ESTIMATION    UA w Reflex to Microscopic w Reflex to Culture -Lab Collect    Screening for prostate cancer        Relevant Orders    PSA, Total Screen        BMI Counseling: Body mass index is 28 89 kg/m²  The BMI is above normal  Nutrition recommendations include encouraging healthy choices of fruits and vegetables  Depression Screening and Follow-up Plan: Patient's depression screening was positive with a PHQ-2 score of 0  Clincally patient does not have depression  No treatment is required  Falls Plan of Care: Medications that increase falls were reviewed  Vitamin D supplementation was recommended  Preventive health issues were discussed with patient, and age appropriate screening tests were ordered as noted in patient's After Visit Summary  Personalized health advice and appropriate referrals for health education or preventive services given if needed, as noted in patient's After Visit Summary       History of Present Illness:     Patient presents for Medicare Annual Wellness visit    Patient Care Team:  Ladonna Jarrett MD as PCP - General (Family Medicine)     Problem List:     Patient Active Problem List   Diagnosis    Benign essential hypertension    Diarrhea    Lower back pain    Rib pain on left side    Epistaxis    Exposure to COVID-19 virus      Past Medical and Surgical History:     Past Medical History:   Diagnosis Date    Cataract, right eye     last assessed 04/21/2015    Hypertension     Pneumonia      Past Surgical History:   Procedure Laterality Date    CATARACT EXTRACTION, BILATERAL      MYRINGOTOMY W/ TUBES        Family History:     Family History   Problem Relation Age of Onset    Hypertension Father     No Known Problems Mother       Social History:        Social History     Socioeconomic History    Marital status: /Civil Union     Spouse name: None    Number of children: None    Years of education: 15    Highest education level: None   Occupational History    Occupation:    Social Needs    Financial resource strain: None    Food insecurity     Worry: None     Inability: None    Transportation needs     Medical: None     Non-medical: None   Tobacco Use    Smoking status: Never Smoker    Smokeless tobacco: Never Used   Substance and Sexual Activity    Alcohol use: Yes     Frequency: 2-3 times a week    Drug use: No    Sexual activity: None   Lifestyle    Physical activity     Days per week: None     Minutes per session: None    Stress: None   Relationships    Social connections     Talks on phone: None     Gets together: None     Attends Shinto service: None     Active member of club or organization: None     Attends meetings of clubs or organizations: None     Relationship status: None    Intimate partner violence     Fear of current or ex partner: None     Emotionally abused: None     Physically abused: None     Forced sexual activity: None   Other Topics Concern    None   Social History Narrative    None      Medications and Allergies:     Current Outpatient Medications   Medication Sig Dispense Refill    amLODIPine-benazepril (LOTREL 5-10) 5-10 MG per capsule Take 1 capsule by mouth daily 90 capsule 1     No current facility-administered medications for this visit  No Known Allergies   Immunizations:     Immunization History   Administered Date(s) Administered    INFLUENZA 10/27/2015, 10/26/2016, 11/06/2018    Influenza Quadrivalent Preservative Free 3 years and older IM 10/21/2014    Influenza Split High Dose Preservative Free IM 10/27/2015, 10/26/2016    Influenza, recombinant, quadrivalent,injectable, preservative free 09/18/2020    Influenza, seasonal, injectable 01/15/2013, 10/29/2013    Pneumococcal Conjugate 13-Valent 08/17/2017    Pneumococcal Polysaccharide PPV23 05/16/2018    Td (adult), adsorbed 10/31/2000    Tdap 01/24/2008    Zoster Vaccine Recombinant 08/12/2019, 11/05/2019      Health Maintenance:         Topic Date Due    Colorectal Cancer Screening  12/09/1998    Hepatitis C Screening  Completed         Topic Date Due    DTaP,Tdap,and Td Vaccines (2 - Td) 01/24/2018      Medicare Health Risk Assessment:     There were no vitals taken for this visit  Health Risk Assessment:   Patient rates overall health as good  Patient feels that their physical health rating is same  Eyesight was rated as same  Hearing was rated as slightly worse  Patient feels that their emotional and mental health rating is same  Pain experienced in the last 7 days has been none  Patient states that he has experienced no weight loss or gain in last 6 months  Depression Screening:   PHQ-2 Score: 0      Fall Risk Screening: In the past year, patient has experienced: no history of falling in past year      Home Safety:  Patient does not have trouble with stairs inside or outside of their home  Patient has working smoke alarms and has working carbon monoxide detector  Home safety hazards include: none  Nutrition:   Current diet is Regular and No Added Salt  Medications:   Patient is not currently taking any over-the-counter supplements  Patient is able to manage medications       Activities of Daily Living (ADLs)/Instrumental Activities of Daily Living (IADLs):   Walk and transfer into and out of bed and chair?: Yes  Dress and groom yourself?: Yes    Bathe or shower yourself?: Yes    Feed yourself? Yes  Do your laundry/housekeeping?: Yes  Manage your money, pay your bills and track your expenses?: Yes  Make your own meals?: Yes    Do your own shopping?: Yes    Previous Hospitalizations:   Any hospitalizations or ED visits within the last 12 months?: No      Advance Care Planning:   Living will: Yes    Durable POA for healthcare: Yes    Advanced directive: Yes      Cognitive Screening:   Provider or family/friend/caregiver concerned regarding cognition?: No    PREVENTIVE SCREENINGS      Cardiovascular Screening:    General: Screening Current      Colorectal Cancer Screening:     General: Risks and Benefits Discussed    Due for: FOBT/FIT      Abdominal Aortic Aneurysm (AAA) Screening:    Risk factors include: age between 73-67 yo        Lung Cancer Screening:     General: Screening Not Indicated      Hepatitis C Screening:    General: Screening Current    Other Counseling Topics:   Alcohol use counseling, car/seat belt/driving safety, skin self-exam, sunscreen and calcium and vitamin D intake and regular weightbearing exercise       Lab Results   Component Value Date    TRIG 223 (H) 08/25/2017    HDL 41 08/25/2017    ALT 25 08/25/2017    AST 19 08/25/2017    K 3 7 08/25/2017     08/25/2017    CREATININE 0 96 08/25/2017    BUN 13 08/25/2017    CO2 25 08/25/2017    PSA 0 4 08/25/2017    GLUF 120 (H) 08/25/2017     No results found for: Emerson Moise MD

## 2021-03-02 DIAGNOSIS — Z23 ENCOUNTER FOR IMMUNIZATION: ICD-10-CM

## 2021-04-06 ENCOUNTER — TELEPHONE (OUTPATIENT)
Dept: FAMILY MEDICINE CLINIC | Facility: OTHER | Age: 71
End: 2021-04-06

## 2021-04-06 ENCOUNTER — OFFICE VISIT (OUTPATIENT)
Dept: FAMILY MEDICINE CLINIC | Facility: OTHER | Age: 71
End: 2021-04-06
Payer: COMMERCIAL

## 2021-04-06 VITALS
DIASTOLIC BLOOD PRESSURE: 90 MMHG | BODY MASS INDEX: 31.41 KG/M2 | WEIGHT: 207.25 LBS | OXYGEN SATURATION: 98 % | HEART RATE: 62 BPM | HEIGHT: 68 IN | TEMPERATURE: 98.4 F | SYSTOLIC BLOOD PRESSURE: 142 MMHG

## 2021-04-06 DIAGNOSIS — H61.21 HEARING LOSS OF RIGHT EAR DUE TO CERUMEN IMPACTION: ICD-10-CM

## 2021-04-06 DIAGNOSIS — H60.503 ACUTE OTITIS EXTERNA OF BOTH EARS, UNSPECIFIED TYPE: Primary | ICD-10-CM

## 2021-04-06 PROCEDURE — 99214 OFFICE O/P EST MOD 30 MIN: CPT | Performed by: FAMILY MEDICINE

## 2021-04-06 NOTE — TELEPHONE ENCOUNTER
Patient called and stated the medication sent to pharmacy is not covered by Insurance  I called pharmacy and they stated the ofloxacin is usually covered   Please advise

## 2021-04-06 NOTE — PROGRESS NOTES
Assessment/Plan:     Diagnoses and all orders for this visit:    Acute otitis externa of both ears, unspecified type  -    Patient complaining of L> R hearing loss, worsening x 1 week along with pain L>R with use of OTC debrox drops  -    Will trial patient on Topical otic abx  -     neomycin-colistin-hydrocortisone-thonzonium (CORTISPORIN TC) 0 33-0 3-1-0 05 % otic suspension; Administer 3 drops into both ears 4 (four) times a day    Hearing loss of right ear due to cerumen impaction  - Patient complaining of L> R hearing loss, worsening x 1 week, however, on examination no ear impaction noted in L ear-only R ear impaction  - Hearing loss may be secondary to acute OE    Other orders  -     Ear cerumen removal        Subjective:      Patient ID: Aristeo Felix is a 65 y o  male  Earache   Affected ear: Patient reports ear pain of left hear and reports hearing loss due to earwax impaction  This is a new problem  Episode onset: 1 week  The problem has been gradually worsening  There has been no fever  The pain is mild (Patient reports pain in the left ear when introducing OTC ear drops  )  Associated symptoms include ear discharge and hearing loss  Pertinent negatives include no coughing, headaches, neck pain, rhinorrhea, sore throat or vomiting  Associated symptoms comments: Patient does report some "yellow colored" discharge over the past week only noted from the left ear    He has tried ear drops for the symptoms  The treatment provided no relief  Patient does report previous TM manipulation as a child 2/2 chronic ear infections  No tubes placed as this procedure was not available to him at that time  The following portions of the patient's history were reviewed and updated as appropriate: allergies, current medications, past family history, past medical history, past social history, past surgical history and problem list     Review of Systems   Constitutional: Negative for chills and fever     HENT: Positive for ear discharge, ear pain and hearing loss  Negative for congestion, rhinorrhea, sinus pressure, sinus pain and sore throat  Eyes: Negative for visual disturbance  Respiratory: Negative for cough  Cardiovascular: Negative for chest pain  Gastrointestinal: Negative for nausea and vomiting  Musculoskeletal: Negative for neck pain  Neurological: Negative for headaches  Objective:      /90 (BP Location: Left arm, Patient Position: Sitting, Cuff Size: Large)   Pulse 62   Temp 98 4 °F (36 9 °C) (Temporal)   Ht 5' 8" (1 727 m)   Wt 94 kg (207 lb 4 oz)   SpO2 98%   BMI 31 51 kg/m²          Physical Exam  Constitutional:       Appearance: Normal appearance  HENT:      Head: Normocephalic and atraumatic  Right Ear: Tenderness present  There is impacted cerumen  No mastoid tenderness  Tympanic membrane is injected  Left Ear: Drainage and tenderness present  There is no impacted cerumen  No foreign body  No mastoid tenderness  Tympanic membrane is injected and erythematous  Ears:      Comments: L>R external canal erythema        Mouth/Throat:      Mouth: Mucous membranes are moist       Pharynx: Oropharynx is clear  Eyes:      Conjunctiva/sclera: Conjunctivae normal    Neck:      Musculoskeletal: Neck supple  Cardiovascular:      Rate and Rhythm: Normal rate  Pulses: Normal pulses  Pulmonary:      Effort: Pulmonary effort is normal       Breath sounds: Normal breath sounds  Neurological:      Mental Status: He is alert and oriented to person, place, and time  Psychiatric:         Mood and Affect: Mood normal          Thought Content:  Thought content normal            Ear cerumen removal    Date/Time: 4/6/2021 2:20 PM  Performed by: Cash Cartagena MD  Authorized by: Cash Cartagena MD   Universal Protocol:  Risks and benefits: risks, benefits and alternatives were discussed  Consent given by: patient  Patient understanding: patient states understanding of the procedure being performed  Patient identity confirmed: verbally with patient      Patient location:  Clinic  Indications / Diagnosis:  Right ear impaction   Procedure details:     Location:  R ear    Procedure type: curette    Post-procedure details:     Hearing quality:  Improved    Patient tolerance of procedure: Tolerated well, no immediate complications  Comments:      Discussed with patient that my experiencing bleed s/p procedure and "ear popping" sensation given removal of cerumen  No immediate complications or concerns, patient tolerated procedure well

## 2021-04-07 DIAGNOSIS — H60.503 ACUTE OTITIS EXTERNA OF BOTH EARS, UNSPECIFIED TYPE: Primary | ICD-10-CM

## 2021-04-07 RX ORDER — OFLOXACIN 3 MG/ML
5 SOLUTION AURICULAR (OTIC) DAILY
Qty: 5 ML | Refills: 1 | Status: SHIPPED | OUTPATIENT
Start: 2021-04-07 | End: 2021-05-10

## 2021-04-09 ENCOUNTER — RA CDI HCC (OUTPATIENT)
Dept: OTHER | Facility: HOSPITAL | Age: 71
End: 2021-04-09

## 2021-04-09 NOTE — PROGRESS NOTES
[facility], [place] [facility]  coding oppertunities          Chart reviewed, no opportunity found: CHART REVIEWED, NO OPPORTUNITY FOUND

## 2021-04-13 ENCOUNTER — TELEPHONE (OUTPATIENT)
Dept: FAMILY MEDICINE CLINIC | Facility: OTHER | Age: 71
End: 2021-04-13

## 2021-04-15 ENCOUNTER — OFFICE VISIT (OUTPATIENT)
Dept: FAMILY MEDICINE CLINIC | Facility: OTHER | Age: 71
End: 2021-04-15
Payer: COMMERCIAL

## 2021-04-15 VITALS
TEMPERATURE: 98.4 F | SYSTOLIC BLOOD PRESSURE: 112 MMHG | BODY MASS INDEX: 31.07 KG/M2 | WEIGHT: 205 LBS | OXYGEN SATURATION: 95 % | HEART RATE: 64 BPM | RESPIRATION RATE: 18 BRPM | HEIGHT: 68 IN | DIASTOLIC BLOOD PRESSURE: 68 MMHG

## 2021-04-15 DIAGNOSIS — H71.92 CHOLESTEATOMA OF LEFT EAR: ICD-10-CM

## 2021-04-15 DIAGNOSIS — H91.92 HEARING LOSS OF LEFT EAR, UNSPECIFIED HEARING LOSS TYPE: Primary | ICD-10-CM

## 2021-04-15 PROCEDURE — 3008F BODY MASS INDEX DOCD: CPT | Performed by: FAMILY MEDICINE

## 2021-04-15 PROCEDURE — 1036F TOBACCO NON-USER: CPT | Performed by: FAMILY MEDICINE

## 2021-04-15 PROCEDURE — 99213 OFFICE O/P EST LOW 20 MIN: CPT | Performed by: FAMILY MEDICINE

## 2021-04-15 PROCEDURE — 1160F RVW MEDS BY RX/DR IN RCRD: CPT | Performed by: FAMILY MEDICINE

## 2021-04-15 NOTE — ASSESSMENT & PLAN NOTE
Patient continues to have left ear hearing loss unrelieved after prescription of neomycin-colistin-hydrocortisone-thonzonium otic ear drops  Otoscopic exam reveals drainage and granulation tissue in the left ear canal that obstructs the tympanic membrane  Referred patient to ENT for evaluation and treatment

## 2021-04-15 NOTE — PROGRESS NOTES
Assessment/Plan:    Hearing loss of left ear  Patient continues to have left ear hearing loss unrelieved after prescription of neomycin-colistin-hydrocortisone-thonzonium otic ear drops  Otoscopic exam reveals drainage and granulation tissue in the left ear canal that obstructs the tympanic membrane  Referred patient to ENT for evaluation and treatment    Cholesteatoma of left ear  Otoscopic exam reveals drainage and granulation tissue in the left ear canal that obstructs the tympanic membrane  Likely a cholesteatoma  Referred patient to ENT for evaluation and management         Problem List Items Addressed This Visit        Nervous and Auditory    Hearing loss of left ear - Primary     Patient continues to have left ear hearing loss unrelieved after prescription of neomycin-colistin-hydrocortisone-thonzonium otic ear drops  Otoscopic exam reveals drainage and granulation tissue in the left ear canal that obstructs the tympanic membrane  Referred patient to ENT for evaluation and treatment         Relevant Orders    Ambulatory Referral to Otolaryngology    Cholesteatoma of left ear     Otoscopic exam reveals drainage and granulation tissue in the left ear canal that obstructs the tympanic membrane  Likely a cholesteatoma  Referred patient to ENT for evaluation and management         Relevant Orders    Ambulatory Referral to Otolaryngology            Subjective:      Patient ID: Dany Hummel is a 65 y o  male  Patient is a 65year old male with a sPMHx of hypertension who presents for a follow up appointment today  Patient was seen on 4/6/21 by Dr Yanna Bond for L> R hearing loss  During that visit the patient was diagnosed with otitis externa of both ears  Patient had both ears irrigated and cleaned and was prescribed neomycin-colistin-hydrocortisone-thonzonium otic drops for both ears  The patient reports that he used the otic drops as directed in both ears   Patient reports his hearing in his right ear returned to normal, but he continues to have hearing loss and fullness in his left ear  ROS positive for postnasal drip and cough  The following portions of the patient's history were reviewed and updated as appropriate:   He  has a past medical history of Cataract, right eye, Hypertension, and Pneumonia  He   Patient Active Problem List    Diagnosis Date Noted    Hearing loss of left ear 04/15/2021    Cholesteatoma of left ear 04/15/2021    Exposure to COVID-19 virus 12/01/2020    Epistaxis 11/16/2018    Diarrhea 04/21/2015    Rib pain on left side 04/21/2015    Lower back pain 06/11/2014    Benign essential hypertension 11/30/2012     He  has a past surgical history that includes Cataract extraction, bilateral and Myringotomy w/ tubes  His family history includes Hypertension in his father; No Known Problems in his mother  He  reports that he has never smoked  He has never used smokeless tobacco  He reports current alcohol use  He reports that he does not use drugs  Current Outpatient Medications   Medication Sig Dispense Refill    amLODIPine-benazepril (LOTREL 5-10) 5-10 MG per capsule Take 1 capsule by mouth daily 90 capsule 1    ofloxacin (FLOXIN) 0 3 % otic solution Administer 5 drops into both ears daily (Patient not taking: Reported on 4/15/2021) 5 mL 1     No current facility-administered medications for this visit  Current Outpatient Medications on File Prior to Visit   Medication Sig    amLODIPine-benazepril (LOTREL 5-10) 5-10 MG per capsule Take 1 capsule by mouth daily    ofloxacin (FLOXIN) 0 3 % otic solution Administer 5 drops into both ears daily (Patient not taking: Reported on 4/15/2021)     No current facility-administered medications on file prior to visit  He has No Known Allergies       Review of Systems   Constitutional: Negative for chills, fatigue and fever  HENT: Positive for congestion, hearing loss and postnasal drip   Negative for rhinorrhea, sneezing and sore throat  Eyes: Negative for visual disturbance  Respiratory: Positive for cough  Negative for shortness of breath  Cardiovascular: Negative for chest pain and palpitations  Gastrointestinal: Negative for abdominal pain, diarrhea, nausea and vomiting  Skin: Negative for color change and rash  Objective:      /68   Pulse 64   Temp 98 4 °F (36 9 °C)   Resp 18   Ht 5' 8" (1 727 m)   Wt 93 kg (205 lb)   SpO2 95%   BMI 31 17 kg/m²          Physical Exam  Vitals signs reviewed  Constitutional:       General: He is not in acute distress  Appearance: He is not ill-appearing  HENT:      Head: Normocephalic and atraumatic  Right Ear: Tympanic membrane, ear canal and external ear normal  No decreased hearing noted  No drainage or tenderness  There is no impacted cerumen  Left Ear: Decreased hearing noted  Drainage present  No tenderness  There is no impacted cerumen  Ears:      Comments: Drainage and granulation tissue in the ear canal  Not able to fully visualize tympanic membrane     Mouth/Throat:      Mouth: Mucous membranes are moist       Pharynx: Oropharynx is clear  No oropharyngeal exudate or posterior oropharyngeal erythema  Eyes:      General: No scleral icterus  Extraocular Movements: Extraocular movements intact  Pupils: Pupils are equal, round, and reactive to light  Cardiovascular:      Rate and Rhythm: Normal rate and regular rhythm  Pulses: Normal pulses  Heart sounds: No murmur  Pulmonary:      Effort: Pulmonary effort is normal  No respiratory distress  Breath sounds: No wheezing  Skin:     Capillary Refill: Capillary refill takes less than 2 seconds  Neurological:      Mental Status: He is alert

## 2021-04-15 NOTE — ASSESSMENT & PLAN NOTE
Otoscopic exam reveals drainage and granulation tissue in the left ear canal that obstructs the tympanic membrane  Likely a cholesteatoma  Referred patient to ENT for evaluation and management

## 2021-05-18 ENCOUNTER — VBI (OUTPATIENT)
Dept: ADMINISTRATIVE | Facility: OTHER | Age: 71
End: 2021-05-18

## 2021-05-19 DIAGNOSIS — I10 BENIGN ESSENTIAL HYPERTENSION: ICD-10-CM

## 2021-05-19 DIAGNOSIS — I10 ESSENTIAL HYPERTENSION: ICD-10-CM

## 2021-05-19 RX ORDER — AMLODIPINE BESYLATE AND BENAZEPRIL HYDROCHLORIDE 5; 10 MG/1; MG/1
CAPSULE ORAL
Qty: 90 CAPSULE | Refills: 1 | OUTPATIENT
Start: 2021-05-19

## 2021-05-24 ENCOUNTER — RA CDI HCC (OUTPATIENT)
Dept: OTHER | Facility: HOSPITAL | Age: 71
End: 2021-05-24

## 2021-05-24 NOTE — PROGRESS NOTES
[facility] 73  coding opportunities          Chart reviewed, no opportunity found: CHART REVIEWED, NO OPPORTUNITY FOUND              Patients insurance company: [facility] [address]  (Medicare Advantage and Commercial)

## 2021-05-25 DIAGNOSIS — I10 BENIGN ESSENTIAL HYPERTENSION: ICD-10-CM

## 2021-05-25 DIAGNOSIS — I10 ESSENTIAL HYPERTENSION: ICD-10-CM

## 2021-05-25 RX ORDER — AMLODIPINE BESYLATE AND BENAZEPRIL HYDROCHLORIDE 5; 10 MG/1; MG/1
1 CAPSULE ORAL DAILY
Qty: 90 CAPSULE | Refills: 1 | Status: SHIPPED | OUTPATIENT
Start: 2021-05-25 | End: 2021-12-06 | Stop reason: SDUPTHER

## 2021-05-25 NOTE — TELEPHONE ENCOUNTER
This office only provides 6 months supply and further refills can be requested by calling the office directly a week prior to refill need  Thanks

## 2021-07-14 ENCOUNTER — TELEPHONE (OUTPATIENT)
Dept: FAMILY MEDICINE CLINIC | Facility: OTHER | Age: 71
End: 2021-07-14

## 2021-07-14 NOTE — TELEPHONE ENCOUNTER
Follow up call to patient regarding [employer] order from 2/2021  Patient states he will try to get it done within the next week or two

## 2021-09-23 PROBLEM — H69.82 DYSFUNCTION OF LEFT EUSTACHIAN TUBE: Status: ACTIVE | Noted: 2021-09-23

## 2021-09-23 PROBLEM — H69.92 DYSFUNCTION OF LEFT EUSTACHIAN TUBE: Status: ACTIVE | Noted: 2021-09-23

## 2021-11-01 ENCOUNTER — VBI (OUTPATIENT)
Dept: ADMINISTRATIVE | Facility: OTHER | Age: 71
End: 2021-11-01

## 2021-12-06 DIAGNOSIS — I10 BENIGN ESSENTIAL HYPERTENSION: ICD-10-CM

## 2021-12-06 DIAGNOSIS — I10 ESSENTIAL HYPERTENSION: ICD-10-CM

## 2021-12-06 RX ORDER — AMLODIPINE BESYLATE AND BENAZEPRIL HYDROCHLORIDE 5; 10 MG/1; MG/1
1 CAPSULE ORAL DAILY
Qty: 90 CAPSULE | Refills: 1 | Status: SHIPPED | OUTPATIENT
Start: 2021-12-06 | End: 2022-06-08

## 2022-02-11 ENCOUNTER — OFFICE VISIT (OUTPATIENT)
Dept: FAMILY MEDICINE CLINIC | Facility: OTHER | Age: 72
End: 2022-02-11
Payer: COMMERCIAL

## 2022-02-11 VITALS
DIASTOLIC BLOOD PRESSURE: 68 MMHG | WEIGHT: 209 LBS | BODY MASS INDEX: 31.67 KG/M2 | TEMPERATURE: 98 F | HEIGHT: 68 IN | RESPIRATION RATE: 18 BRPM | OXYGEN SATURATION: 96 % | SYSTOLIC BLOOD PRESSURE: 120 MMHG | HEART RATE: 64 BPM

## 2022-02-11 DIAGNOSIS — R53.83 FATIGUE, UNSPECIFIED TYPE: ICD-10-CM

## 2022-02-11 DIAGNOSIS — R60.0 BILATERAL LEG EDEMA: ICD-10-CM

## 2022-02-11 DIAGNOSIS — R06.02 SOB (SHORTNESS OF BREATH): Primary | ICD-10-CM

## 2022-02-11 DIAGNOSIS — R63.5 RAPID WEIGHT GAIN: ICD-10-CM

## 2022-02-11 PROCEDURE — 99213 OFFICE O/P EST LOW 20 MIN: CPT | Performed by: FAMILY MEDICINE

## 2022-02-11 PROCEDURE — 1036F TOBACCO NON-USER: CPT | Performed by: FAMILY MEDICINE

## 2022-02-11 PROCEDURE — 1160F RVW MEDS BY RX/DR IN RCRD: CPT | Performed by: FAMILY MEDICINE

## 2022-02-11 PROCEDURE — 3008F BODY MASS INDEX DOCD: CPT | Performed by: FAMILY MEDICINE

## 2022-02-11 NOTE — PROGRESS NOTES
Assessment/Plan:   1  SOB (shortness of breath)  -     Echo complete w/ contrast if indicated; Future; Expected date: 02/11/2022  -     Ambulatory Referral to Sleep Medicine; Future  -     CBC and differential; Future  -     Comprehensive metabolic panel; Future  -     NT-BNP PRO; Future    2  Bilateral leg edema  -     Echo complete w/ contrast if indicated; Future; Expected date: 02/11/2022  -     Comprehensive metabolic panel; Future    3  Fatigue, unspecified type  -     TSH, 3rd generation with Free T4 reflex; Future    4  Rapid weight gain  -     TSH, 3rd generation with Free T4 reflex; Future      Will send patient for a sleep study and an echocardiogram   Stop Bang score 7  Physical examination did show some lower extremity swelling  Distant heart sounds possible S3 appreciated on auscultation  Concern for heart failure given increased weight gain and witnessed increased work of breathing at rest   Oxygen saturation 96% in office  Blood pressure is stable  Obtain diagnostic tests and lab work and follow-up in 4 weeks  Counseled patient on decreasing sodium intake to less than 2 g daily  Return in about 4 weeks (around 3/11/2022) for Recheck  Subjective:    HPI  Kennedy Miller is a 66 y o  male who presents with:  Chief Complaint     Shortness of Breath          HPI     Shortness of Breath      Additional comments: while sleeping          Last edited by Melinda Hylton MA on 2/11/2022  1:44 PM  (History)        ---Above per clinical staff & reviewed  ---      Patient presents today reporting shortness of breath when laying down  He notes a 30 lb weight gain since October  Patient denies any dyspnea on exertion, chest pain, dizziness, changes in his vision, lower extremity swelling or any new medications at this time  His wife reports that he snores quite often  Patient has unrefreshing sleep and fatigue throughout the day  Patient would also like to discuss medication for erectile dysfunction  Patient is able to achieve erection but this does not last for long  Of note while speaking with patient is work of breathing is increased  The following portions of the patient's history were reviewed and updated as appropriate: allergies, current medications, past family history, past medical history, past social history, past surgical history and problem list     Review of Systems   Constitutional: Positive for fatigue  Respiratory: Positive for shortness of breath  Negative for chest tightness, wheezing and stridor  Cardiovascular: Negative for chest pain, palpitations and leg swelling  Gastrointestinal: Negative for abdominal pain, nausea and vomiting  Genitourinary: Negative for penile pain, penile swelling and testicular pain  Difficulty maintaining erection   Musculoskeletal: Negative for back pain, joint swelling, neck pain and neck stiffness  Neurological: Negative for dizziness, speech difficulty, light-headedness and headaches  Psychiatric/Behavioral: Negative for confusion and dysphoric mood  Objective:    Ht 5' 8" (1 727 m)   BMI 30 71 kg/m²   Wt Readings from Last 3 Encounters:   11/08/21 91 6 kg (202 lb)   10/13/21 86 2 kg (190 lb)   09/30/21 86 2 kg (190 lb)     BP Readings from Last 3 Encounters:   04/15/21 112/68   04/06/21 142/90   08/12/19 114/64     Current Outpatient Medications   Medication Sig Dispense Refill    amLODIPine-benazepril (LOTREL 5-10) 5-10 MG per capsule Take 1 capsule by mouth daily 90 capsule 1    prednisoLONE acetate (PRED FORTE) 1 % ophthalmic suspension Administer 2 drops into the left eye 2 (two) times a day 10 mL 0     No current facility-administered medications for this visit  Physical Exam  Constitutional:       General: He is not in acute distress  Appearance: He is well-developed  HENT:      Head: Normocephalic and atraumatic  Neck:      Vascular: No JVD     Cardiovascular:      Rate and Rhythm: Normal rate and regular rhythm  Heart sounds: Heart sounds are distant  Gallop present  S3 sounds present  Pulmonary:      Breath sounds: No decreased breath sounds, wheezing, rhonchi or rales  Abdominal:      Palpations: Abdomen is soft  There is no mass  Musculoskeletal:      Cervical back: Normal range of motion  Right lower le+ Edema present  Skin:     General: Skin is warm and dry  Neurological:      General: No focal deficit present  Mental Status: He is alert and oriented to person, place, and time     Psychiatric:         Mood and Affect: Mood normal          Behavior: Behavior normal

## 2022-02-17 ENCOUNTER — APPOINTMENT (OUTPATIENT)
Dept: LAB | Facility: CLINIC | Age: 72
End: 2022-02-17
Payer: COMMERCIAL

## 2022-02-17 DIAGNOSIS — R60.0 BILATERAL LEG EDEMA: ICD-10-CM

## 2022-02-17 DIAGNOSIS — R06.02 SOB (SHORTNESS OF BREATH): ICD-10-CM

## 2022-02-17 DIAGNOSIS — R53.83 FATIGUE, UNSPECIFIED TYPE: ICD-10-CM

## 2022-02-17 DIAGNOSIS — R63.5 RAPID WEIGHT GAIN: ICD-10-CM

## 2022-02-17 LAB
ALBUMIN SERPL BCP-MCNC: 3.4 G/DL (ref 3.5–5)
ALP SERPL-CCNC: 69 U/L (ref 46–116)
ALT SERPL W P-5'-P-CCNC: 29 U/L (ref 12–78)
ANION GAP SERPL CALCULATED.3IONS-SCNC: 5 MMOL/L (ref 4–13)
AST SERPL W P-5'-P-CCNC: 19 U/L (ref 5–45)
BASOPHILS # BLD AUTO: 0.05 THOUSANDS/ΜL (ref 0–0.1)
BASOPHILS NFR BLD AUTO: 1 % (ref 0–1)
BILIRUB SERPL-MCNC: 0.6 MG/DL (ref 0.2–1)
BUN SERPL-MCNC: 20 MG/DL (ref 5–25)
CALCIUM ALBUM COR SERPL-MCNC: 9.9 MG/DL (ref 8.3–10.1)
CALCIUM SERPL-MCNC: 9.4 MG/DL (ref 8.3–10.1)
CHLORIDE SERPL-SCNC: 107 MMOL/L (ref 100–108)
CO2 SERPL-SCNC: 25 MMOL/L (ref 21–32)
CREAT SERPL-MCNC: 1.12 MG/DL (ref 0.6–1.3)
EOSINOPHIL # BLD AUTO: 0.09 THOUSAND/ΜL (ref 0–0.61)
EOSINOPHIL NFR BLD AUTO: 2 % (ref 0–6)
ERYTHROCYTE [DISTWIDTH] IN BLOOD BY AUTOMATED COUNT: 12.5 % (ref 11.6–15.1)
GFR SERPL CREATININE-BSD FRML MDRD: 64 ML/MIN/1.73SQ M
GLUCOSE P FAST SERPL-MCNC: 127 MG/DL (ref 65–99)
HCT VFR BLD AUTO: 45.1 % (ref 36.5–49.3)
HGB BLD-MCNC: 15.5 G/DL (ref 12–17)
IMM GRANULOCYTES # BLD AUTO: 0.03 THOUSAND/UL (ref 0–0.2)
IMM GRANULOCYTES NFR BLD AUTO: 1 % (ref 0–2)
LYMPHOCYTES # BLD AUTO: 1.15 THOUSANDS/ΜL (ref 0.6–4.47)
LYMPHOCYTES NFR BLD AUTO: 19 % (ref 14–44)
MCH RBC QN AUTO: 31.5 PG (ref 26.8–34.3)
MCHC RBC AUTO-ENTMCNC: 34.4 G/DL (ref 31.4–37.4)
MCV RBC AUTO: 92 FL (ref 82–98)
MONOCYTES # BLD AUTO: 0.66 THOUSAND/ΜL (ref 0.17–1.22)
MONOCYTES NFR BLD AUTO: 11 % (ref 4–12)
NEUTROPHILS # BLD AUTO: 4.09 THOUSANDS/ΜL (ref 1.85–7.62)
NEUTS SEG NFR BLD AUTO: 66 % (ref 43–75)
NRBC BLD AUTO-RTO: 0 /100 WBCS
NT-PROBNP SERPL-MCNC: 227 PG/ML
PLATELET # BLD AUTO: 153 THOUSANDS/UL (ref 149–390)
PMV BLD AUTO: 11.8 FL (ref 8.9–12.7)
POTASSIUM SERPL-SCNC: 3.9 MMOL/L (ref 3.5–5.3)
PROT SERPL-MCNC: 7.2 G/DL (ref 6.4–8.2)
RBC # BLD AUTO: 4.92 MILLION/UL (ref 3.88–5.62)
SODIUM SERPL-SCNC: 137 MMOL/L (ref 136–145)
TSH SERPL DL<=0.05 MIU/L-ACNC: 1.79 UIU/ML (ref 0.36–3.74)
WBC # BLD AUTO: 6.07 THOUSAND/UL (ref 4.31–10.16)

## 2022-02-17 PROCEDURE — 85025 COMPLETE CBC W/AUTO DIFF WBC: CPT

## 2022-02-17 PROCEDURE — 84443 ASSAY THYROID STIM HORMONE: CPT

## 2022-02-17 PROCEDURE — 36415 COLL VENOUS BLD VENIPUNCTURE: CPT

## 2022-02-17 PROCEDURE — 83880 ASSAY OF NATRIURETIC PEPTIDE: CPT

## 2022-02-17 PROCEDURE — 80053 COMPREHEN METABOLIC PANEL: CPT

## 2022-02-22 ENCOUNTER — HOSPITAL ENCOUNTER (OUTPATIENT)
Dept: NON INVASIVE DIAGNOSTICS | Facility: CLINIC | Age: 72
Discharge: HOME | End: 2022-02-22
Payer: COMMERCIAL

## 2022-02-22 VITALS
HEIGHT: 68 IN | WEIGHT: 209 LBS | BODY MASS INDEX: 31.67 KG/M2 | HEART RATE: 64 BPM | DIASTOLIC BLOOD PRESSURE: 68 MMHG | SYSTOLIC BLOOD PRESSURE: 120 MMHG

## 2022-02-22 DIAGNOSIS — R06.02 SOB (SHORTNESS OF BREATH): ICD-10-CM

## 2022-02-22 DIAGNOSIS — R60.0 BILATERAL LEG EDEMA: ICD-10-CM

## 2022-02-22 LAB
AORTIC ROOT: 3.5 CM
APICAL FOUR CHAMBER EJECTION FRACTION: 65 %
ASCENDING AORTA: 3.7 CM (ref 2.11–3.15)
E WAVE DECELERATION TIME: 234 MS
FRACTIONAL SHORTENING: 32 % (ref 28–44)
INTERVENTRICULAR SEPTUM IN DIASTOLE (PARASTERNAL SHORT AXIS VIEW): 1.2 CM (ref 0.55–1.03)
INTERVENTRICULAR SEPTUM: 1.2 CM (ref 0.6–1.1)
LAAS-AP2: 24.8 CM2
LAAS-AP4: 23 CM2
LEFT ATRIUM SIZE: 3.9 CM
LEFT INTERNAL DIMENSION IN SYSTOLE: 3.2 CM (ref 3.5–5.31)
LEFT VENTRICULAR INTERNAL DIMENSION IN DIASTOLE: 4.7 CM (ref 5.8–8.64)
LEFT VENTRICULAR POSTERIOR WALL IN END DIASTOLE: 1.2 CM (ref 0.54–1.02)
LEFT VENTRICULAR STROKE VOLUME: 61 ML
LVSV (TEICH): 61 ML
MV E'TISSUE VEL-SEP: 9 CM/S
MV PEAK A VEL: 0.72 M/S
MV PEAK E VEL: 86 CM/S
MV STENOSIS PRESSURE HALF TIME: 68 MS
MV VALVE AREA P 1/2 METHOD: 3.24 CM2
RIGHT ATRIAL 2D VOLUME: 45 ML
RIGHT ATRIUM AREA SYSTOLE A4C: 17 CM2
RIGHT VENTRICLE ID DIMENSION: 3.3 CM
SL CV LEFT ATRIUM LENGTH A2C: 6.5 CM
SL CV LV EF: 60
SL CV PED ECHO LEFT VENTRICLE DIASTOLIC VOLUME (MOD BIPLANE) 2D: 101 ML
SL CV PED ECHO LEFT VENTRICLE SYSTOLIC VOLUME (MOD BIPLANE) 2D: 40 ML
TR MAX PG: 24 MMHG
TR PEAK VELOCITY: 2.4 M/S
TRICUSPID VALVE PEAK REGURGITATION VELOCITY: 2.43 M/S
Z-SCORE OF ASCENDING AORTA: 4.04
Z-SCORE OF INTERVENTRICULAR SEPTUM IN END DIASTOLE: 3.35
Z-SCORE OF LEFT VENTRICULAR DIMENSION IN END DIASTOLE: -4.09
Z-SCORE OF LEFT VENTRICULAR DIMENSION IN END SYSTOLE: -2.35
Z-SCORE OF LEFT VENTRICULAR POSTERIOR WALL IN END DIASTOLE: 3.47

## 2022-02-22 PROCEDURE — 93306 TTE W/DOPPLER COMPLETE: CPT

## 2022-02-22 PROCEDURE — 93306 TTE W/DOPPLER COMPLETE: CPT | Performed by: INTERNAL MEDICINE

## 2022-02-23 ENCOUNTER — TELEPHONE (OUTPATIENT)
Dept: FAMILY MEDICINE CLINIC | Facility: OTHER | Age: 72
End: 2022-02-23

## 2022-02-23 NOTE — TELEPHONE ENCOUNTER
Patient called and was given the results of below and he also made an appointment for his AWV on 3/14/2022 with Dr Raudel Renteria

## 2022-02-23 NOTE — TELEPHONE ENCOUNTER
Regarding: FW: Question regarding ECHO COMPLETE W/ CONTRAST IF INDICATED  Please let patient know that the Echo result is normal   He is also overdue for AWV and should be scheduled  We can discuss further during next visit  Thanks,  Dr Philipp Still   ----- Message -----  From: Sulema Gamboa  Sent: 2/23/2022   8:58 AM EST  To: Liseth De MD  Subject: Question regarding ECHO COMPLETE W/ CONTRAST#    ----- Message from Sulema Gamboa sent at 2/23/2022  8:58 AM EST -----       ----- Message from Ti Davis to Everton John MD sent at 2/22/2022  4:24 PM -----   What do these results mean?

## 2022-03-15 ENCOUNTER — OFFICE VISIT (OUTPATIENT)
Dept: FAMILY MEDICINE CLINIC | Facility: OTHER | Age: 72
End: 2022-03-15
Payer: COMMERCIAL

## 2022-03-15 VITALS
TEMPERATURE: 98 F | OXYGEN SATURATION: 96 % | DIASTOLIC BLOOD PRESSURE: 80 MMHG | HEIGHT: 68 IN | BODY MASS INDEX: 31.98 KG/M2 | HEART RATE: 64 BPM | RESPIRATION RATE: 20 BRPM | WEIGHT: 211 LBS | SYSTOLIC BLOOD PRESSURE: 120 MMHG

## 2022-03-15 DIAGNOSIS — N52.9 ERECTILE DYSFUNCTION, UNSPECIFIED ERECTILE DYSFUNCTION TYPE: ICD-10-CM

## 2022-03-15 DIAGNOSIS — Z13.6 SCREENING FOR CARDIOVASCULAR CONDITION: ICD-10-CM

## 2022-03-15 DIAGNOSIS — Z12.5 SCREENING FOR PROSTATE CANCER: ICD-10-CM

## 2022-03-15 DIAGNOSIS — Z12.11 SCREEN FOR COLON CANCER: ICD-10-CM

## 2022-03-15 DIAGNOSIS — R73.01 IMPAIRED FASTING BLOOD SUGAR: ICD-10-CM

## 2022-03-15 DIAGNOSIS — Z00.00 MEDICARE ANNUAL WELLNESS VISIT, SUBSEQUENT: Primary | ICD-10-CM

## 2022-03-15 PROBLEM — R04.0 LEFT-SIDED EPISTAXIS: Status: RESOLVED | Noted: 2018-11-16 | Resolved: 2022-03-15

## 2022-03-15 PROCEDURE — 1036F TOBACCO NON-USER: CPT | Performed by: FAMILY MEDICINE

## 2022-03-15 PROCEDURE — G0439 PPPS, SUBSEQ VISIT: HCPCS | Performed by: FAMILY MEDICINE

## 2022-03-15 PROCEDURE — 1003F LEVEL OF ACTIVITY ASSESS: CPT | Performed by: FAMILY MEDICINE

## 2022-03-15 PROCEDURE — 1125F AMNT PAIN NOTED PAIN PRSNT: CPT | Performed by: FAMILY MEDICINE

## 2022-03-15 PROCEDURE — 1170F FXNL STATUS ASSESSED: CPT | Performed by: FAMILY MEDICINE

## 2022-03-15 PROCEDURE — 3725F SCREEN DEPRESSION PERFORMED: CPT | Performed by: FAMILY MEDICINE

## 2022-03-15 PROCEDURE — 1160F RVW MEDS BY RX/DR IN RCRD: CPT | Performed by: FAMILY MEDICINE

## 2022-03-15 PROCEDURE — 3008F BODY MASS INDEX DOCD: CPT | Performed by: FAMILY MEDICINE

## 2022-03-15 PROCEDURE — 3288F FALL RISK ASSESSMENT DOCD: CPT | Performed by: FAMILY MEDICINE

## 2022-03-15 RX ORDER — SILDENAFIL 25 MG/1
25 TABLET, FILM COATED ORAL DAILY PRN
Qty: 10 TABLET | Refills: 0 | Status: SHIPPED | OUTPATIENT
Start: 2022-03-15 | End: 2022-05-05

## 2022-03-15 NOTE — PROGRESS NOTES
Assessment and Plan: Will start sildenafil 25 mg as needed for erectile dysfunction  Counseled on adverse events with concurrent nitrate use  Problem List Items Addressed This Visit     None      Visit Diagnoses     Impaired fasting blood sugar    -  Primary    Relevant Orders    HEMOGLOBIN A1C W/ EAG ESTIMATION    Screening for cardiovascular condition        Relevant Orders    Lipid Panel with Direct LDL reflex    Screen for colon cancer        Relevant Orders    Ambulatory referral for colonoscopy    Screening for prostate cancer        Relevant Orders    PSA, total and free    Erectile dysfunction, unspecified erectile dysfunction type        Relevant Medications    sildenafil (VIAGRA) 25 MG tablet        BMI Counseling: Body mass index is 32 08 kg/m²  The BMI is above normal  Nutrition recommendations include encouraging healthy choices of fruits and vegetables  Exercise recommendations include moderate physical activity 150 minutes/week  No pharmacotherapy was ordered  Rationale for BMI follow-up plan is due to patient being overweight or obese  Depression Screening and Follow-up Plan: Patient was screened for depression during today's encounter  They screened negative with a PHQ-2 score of 0  Nutrition Assessment and Intervention:     Online resources such as NutritionFacts  "[employer], [employer]  [employer], or similar provided to patient      Emotional and Mental Well-being, Sleep, Connectedness Assessment and Intervention:    Depression and anxiety screening performed and reviewed      Tobacco and Toxic Substance Assessment and Intervention:     Tobacco use screening performed    Alcohol and drug use screening performed      Therapeutic Lifestyle Change Visit:     One-on-one comprehensive counseling, coaching, and health behavior change visit completed        Preventive health issues were discussed with patient, and age appropriate screening tests were ordered as noted in patient's After Visit Summary  Personalized health advice and appropriate referrals for health education or preventive services given if needed, as noted in patient's After Visit Summary  History of Present Illness:     Patient presents for Medicare Annual Wellness visit  He notes some weight gain recently with associated SOB when bending over  He also notes that he has had difficulty maintaining erections  Patient Care Team:  Matt Zarate MD as PCP - General (Family Medicine)     Problem List:     Patient Active Problem List   Diagnosis    Benign essential hypertension    Exposure to COVID-19 virus    Hearing loss of left ear    Cholesteatoma of left ear    Dysfunction of left eustachian tube      Past Medical and Surgical History:     Past Medical History:   Diagnosis Date    Cataract, right eye     last assessed 04/21/2015    Hypertension     Pneumonia      Past Surgical History:   Procedure Laterality Date    CATARACT EXTRACTION, BILATERAL      MYRINGOTOMY W/ TUBES        Family History:     Family History   Problem Relation Age of Onset    Hypertension Father     No Known Problems Mother       Social History:     Social History     Socioeconomic History    Marital status: /Civil Union     Spouse name: None    Number of children: None    Years of education: 15    Highest education level: None   Occupational History    Occupation:    Tobacco Use    Smoking status: Never Smoker    Smokeless tobacco: Never Used   Vaping Use    Vaping Use: Never used   Substance and Sexual Activity    Alcohol use:  Yes    Drug use: No    Sexual activity: None   Other Topics Concern    None   Social History Narrative    None     Social Determinants of Health     Financial Resource Strain: Not on file   Food Insecurity: Not on file   Transportation Needs: Not on file   Physical Activity: Not on file   Stress: Not on file   Social Connections: Not on file   Intimate Partner Violence: Not on file   Housing Stability: Not on file      Medications and Allergies:     Current Outpatient Medications   Medication Sig Dispense Refill    amLODIPine-benazepril (LOTREL 5-10) 5-10 MG per capsule Take 1 capsule by mouth daily 90 capsule 1    sildenafil (VIAGRA) 25 MG tablet Take 1 tablet (25 mg total) by mouth daily as needed for erectile dysfunction 10 tablet 0     No current facility-administered medications for this visit  No Known Allergies   Immunizations:     Immunization History   Administered Date(s) Administered    COVID-19 MODERNA VACC 0 5 ML IM 03/03/2021, 04/03/2021, 11/21/2021    INFLUENZA 10/27/2015, 10/26/2016, 11/06/2018, 09/23/2021    Influenza Quadrivalent Preservative Free 3 years and older IM 10/21/2014    Influenza Split High Dose Preservative Free IM 10/27/2015, 10/26/2016    Influenza, recombinant, quadrivalent,injectable, preservative free 09/18/2020    Influenza, seasonal, injectable 01/15/2013, 10/29/2013    Pneumococcal Conjugate 13-Valent 08/17/2017    Pneumococcal Polysaccharide PPV23 05/16/2018    Td (adult), adsorbed 10/31/2000    Tdap 01/24/2008    Zoster Vaccine Recombinant 08/12/2019, 11/05/2019      Health Maintenance:         Topic Date Due    Colorectal Cancer Screening  Never done    Hepatitis C Screening  Completed     There are no preventive care reminders to display for this patient  Medicare Health Risk Assessment:     /80   Pulse 64   Temp 98 °F (36 7 °C)   Resp 20   Ht 5' 8" (1 727 m)   Wt 95 7 kg (211 lb)   SpO2 96%   BMI 32 08 kg/m²      Rolando Calloway is here for his Subsequent Wellness visit  Health Risk Assessment:   Patient rates overall health as very good  Patient feels that their physical health rating is same  Patient is very satisfied with their life  Eyesight was rated as same  Hearing was rated as same  Patient feels that their emotional and mental health rating is much better   Patients states they are never, rarely angry  Patient states they are sometimes unusually tired/fatigued  Pain experienced in the last 7 days has been none  Patient states that he has experienced weight loss or gain in last 6 months  Depression Screening:   PHQ-2 Score: 0      Fall Risk Screening: In the past year, patient has experienced: no history of falling in past year      Home Safety:  Patient does not have trouble with stairs inside or outside of their home  Patient has working smoke alarms and has working carbon monoxide detector  Home safety hazards include: none  Nutrition:   Current diet is Regular  Medications:   Patient is not currently taking any over-the-counter supplements  Patient is able to manage medications  Activities of Daily Living (ADLs)/Instrumental Activities of Daily Living (IADLs):   Walk and transfer into and out of bed and chair?: Yes  Dress and groom yourself?: Yes    Bathe or shower yourself?: Yes    Feed yourself? Yes  Do your laundry/housekeeping?: Yes  Manage your money, pay your bills and track your expenses?: Yes  Make your own meals?: Yes    Do your own shopping?: Yes    Previous Hospitalizations:   Any hospitalizations or ED visits within the last 12 months?: No      Advance Care Planning:   Living will: Yes    Durable POA for healthcare:  Yes    Advanced directive: Yes      Cognitive Screening:   Provider or family/friend/caregiver concerned regarding cognition?: No    PREVENTIVE SCREENINGS      Cardiovascular Screening:    General: Screening Current    Due for: Lipid Panel      Diabetes Screening:     General: Screening Current      Colorectal Cancer Screening:       Due for: Colonoscopy - Low Risk      Prostate Cancer Screening:    General: Risks and Benefits Discussed    Due for: PSA      Osteoporosis Screening:    General: Screening Not Indicated      Abdominal Aortic Aneurysm (AAA) Screening:    Risk factors include: age between 73-67 yo        General: Screening Not Indicated Lung Cancer Screening:     General: Screening Not Indicated      Hepatitis C Screening:    General: Screening Current    Screening, Brief Intervention, and Referral to Treatment (SBIRT)    Screening  Typical number of drinks in a day: 0  Typical number of drinks in a week: 2  Interpretation: Low risk drinking behavior  AUDIT-C Screenin) How often did you have a drink containing alcohol in the past year? 2 to 4 times a month  2) How many drinks did you have on a typical day when you were drinking in the past year? 1 to 2  3) How often did you have 6 or more drinks on one occasion in the past year? never    AUDIT-C Score: 2  Interpretation: Score 0-3 (male): Negative screen for alcohol misuse    Single Item Drug Screening:  How often have you used an illegal drug (including marijuana) or a prescription medication for non-medical reasons in the past year? never    Single Item Drug Screen Score: 0  Interpretation: Negative screen for possible drug use disorder    Other Counseling Topics:   Car/seat belt/driving safety, skin self-exam, sunscreen and regular weightbearing exercise and calcium and vitamin D intake  Physical Exam  Vitals reviewed  Constitutional:       General: He is not in acute distress  Appearance: Normal appearance  He is well-developed  He is not diaphoretic  HENT:      Head: Normocephalic and atraumatic  Right Ear: Tympanic membrane and external ear normal       Left Ear: External ear normal       Ears:      Comments: Tympanostomy tube noted in L TM     Nose: Nose normal       Mouth/Throat:      Mouth: Mucous membranes are moist       Pharynx: Oropharynx is clear  Eyes:      Extraocular Movements: Extraocular movements intact  Conjunctiva/sclera: Conjunctivae normal       Pupils: Pupils are equal, round, and reactive to light  Cardiovascular:      Rate and Rhythm: Normal rate and regular rhythm  Pulses: Normal pulses  Heart sounds: Normal heart sounds   No murmur heard  No friction rub  No gallop  Pulmonary:      Effort: Pulmonary effort is normal  No respiratory distress  Breath sounds: Normal breath sounds  No stridor  No wheezing, rhonchi or rales  Abdominal:      General: Abdomen is flat  Bowel sounds are normal  There is no distension  Palpations: Abdomen is soft  There is no mass  Tenderness: There is no abdominal tenderness  There is no right CVA tenderness, left CVA tenderness or guarding  Musculoskeletal:         General: Normal range of motion  Cervical back: Normal range of motion and neck supple  Right lower leg: Edema present  Left lower leg: Edema present  Lymphadenopathy:      Cervical: No cervical adenopathy  Skin:     General: Skin is warm and dry  Findings: No rash  Neurological:      General: No focal deficit present  Mental Status: He is alert and oriented to person, place, and time     Psychiatric:         Mood and Affect: Mood normal          Behavior: Behavior normal          Paola Costello MD

## 2022-04-13 NOTE — TELEPHONE ENCOUNTER
Patient called today  He wants to know if there is a cheaper pill other than Viagra  That he can take    He said Viagra cost over $400 00 please advise
Please have patient ask pharmacy about the alternative that is cheaper  They should be able to provide him with this info  If he finds any I can prescribe him the Rx  Thanks 
Tried leaving 2nd message on voice mail
Tried to leave message on machine but disconnected me
Detail Level: Detailed
General Sunscreen Counseling: I recommended a broad spectrum sunscreen with a SPF of 30 or higher. I explained that SPF 30 sunscreens block approximately 97 percent of the sun's harmful rays. Sunscreens should be applied at least 15 minutes prior to expected sun exposure and then every 2 hours after that as long as sun exposure continues. If swimming or exercising sunscreen should be reapplied every 45 minutes to an hour after getting wet or sweating. One ounce, or the equivalent of a shot glass full of sunscreen, is adequate to protect the skin not covered by a bathing suit. I also recommended a lip balm with a sunscreen as well. Sun protective clothing can be used in lieu of sunscreen but must be worn the entire time you are exposed to the sun's rays.

## 2022-04-20 ENCOUNTER — OFFICE VISIT (OUTPATIENT)
Dept: SLEEP CENTER | Facility: CLINIC | Age: 72
End: 2022-04-20
Payer: COMMERCIAL

## 2022-04-20 VITALS
HEART RATE: 65 BPM | DIASTOLIC BLOOD PRESSURE: 86 MMHG | BODY MASS INDEX: 32.43 KG/M2 | WEIGHT: 214 LBS | SYSTOLIC BLOOD PRESSURE: 140 MMHG | OXYGEN SATURATION: 96 % | HEIGHT: 68 IN

## 2022-04-20 DIAGNOSIS — G47.33 OSA (OBSTRUCTIVE SLEEP APNEA): Primary | ICD-10-CM

## 2022-04-20 DIAGNOSIS — R06.02 SOB (SHORTNESS OF BREATH): ICD-10-CM

## 2022-04-20 PROCEDURE — 3008F BODY MASS INDEX DOCD: CPT | Performed by: INTERNAL MEDICINE

## 2022-04-20 PROCEDURE — 1160F RVW MEDS BY RX/DR IN RCRD: CPT | Performed by: INTERNAL MEDICINE

## 2022-04-20 PROCEDURE — 99203 OFFICE O/P NEW LOW 30 MIN: CPT | Performed by: INTERNAL MEDICINE

## 2022-04-20 NOTE — PROGRESS NOTES
Consultation - [facility] : 1950  MRN: [id number]      Assessment:  The patient has chronic sleep initiation insomnia, but also symptoms consistent with obstructive sleep apnea over the last several years, associated with weight gain  Plan:  I have ordered nocturnal polysomnography to determine if MIGEL is present  If it is, treatment options include positive airway pressure therapy vs weight loss  The patient is not interested in any surgical options  Follow up: After testing    History of Present Illness:   66 y o male with a longstanding history of sleep initiation and sleep maintenance insomnia, essentially since he was a teenager  He denies any history of daytime sleepiness, until recently  His wife reports new onset of snoring, but is unaware of apneas  The patient denies morning headaches  He is able to function throughout the day, but in the evening falls asleep while watching television, which is new for him  He has gained 30 lb over the last several years  He denies awakening with choking or gasping sensation  He has a history of hypertension; his blood pressure was elevated today        Review of Systems      Genitourinary difficulty with erection   Cardiology none   Gastrointestinal none   Neurology none   Constitutional claustrophobia and weight change   Integumentary none   Psychiatry none   Musculoskeletal muscle aches   Pulmonary snoring and difficulty breathing when lying flat    ENT none   Endocrine none   Hematological none       I have reviewed and updated the review of systems as necessary    Historical Information    Past Medical History:  Hypertension    Family History: non-contributory    Social History     Socioeconomic History    Marital status: /Civil Union     Spouse name: Not on file    Number of children: Not on file    Years of education: 15    Highest education level: Not on file   Occupational History    Occupation: Home    Tobacco Use    Smoking status: Never Smoker    Smokeless tobacco: Never Used   Vaping Use    Vaping Use: Never used   Substance and Sexual Activity    Alcohol use: Yes    Drug use: No    Sexual activity: Not on file   Other Topics Concern    Not on file   Social History Narrative    Not on file     Social Determinants of Health     Financial Resource Strain: Not on file   Food Insecurity: Not on file   Transportation Needs: Not on file   Physical Activity: Not on file   Stress: Not on file   Social Connections: Not on file   Intimate Partner Violence: Not on file   Housing Stability: Not on file         Sleep Schedule: unremarkable    Snoring:  Yes    Witnessed Apnea:  No    Medications/Allergies:    Current Outpatient Medications:     amLODIPine-benazepril (LOTREL 5-10) 5-10 MG per capsule, Take 1 capsule by mouth daily, Disp: 90 capsule, Rfl: 1    sildenafil (VIAGRA) 25 MG tablet, Take 1 tablet (25 mg total) by mouth daily as needed for erectile dysfunction, Disp: 10 tablet, Rfl: 0        No notes on file                  Objective:    Vital Signs:   Vitals:    04/20/22 0821   BP: 140/86   Pulse: 65   SpO2: 96%   Weight: 97 1 kg (214 lb)   Height: 5' 8" (1 727 m)            [place] Sleepiness Scale: Total score: 7    Physical Exam:    General: Alert, appropriate, cooperative, overweight    Head: NC/AT, moderate retrognathia    Nose: No septal deviation, nares not obstructed, mucosa normal    Throat: Airway diminished, tongue base thickened, no tonsils visualized    Neck: Normal, no thyromegaly or lymphadenopathy, no JVD    Heart: RR, normal S1 and S2, no murmurs    Chest: Clear bilaterally    Extremity: No clubbing, cyanosis, no edema    Skin: Warm, dry    Neuro: No motor abnormalities, cranial nerves appear intact        Counseling / Coordination of Care  A description of the counseling / coordination of care:   We discussed the pathophysiology of obstructive sleep apnea as well as the possible treatment options  We also discussed the rationale for positive airway pressure therapy  Board Certified Sleep Specialist    Portions of the record may have been created with voice recognition software  Occasional wrong word or "sound a like" substitutions may have occurred due to the inherent limitations of voice recognition software  Read the chart carefully and recognize, using context, where substitutions have occurred

## 2022-04-26 ENCOUNTER — HOSPITAL ENCOUNTER (OUTPATIENT)
Dept: SLEEP CENTER | Facility: CLINIC | Age: 72
Discharge: HOME | End: 2022-04-26
Payer: COMMERCIAL

## 2022-04-26 DIAGNOSIS — G47.33 OSA (OBSTRUCTIVE SLEEP APNEA): ICD-10-CM

## 2022-04-26 PROCEDURE — 95810 POLYSOM 6/> YRS 4/> PARAM: CPT

## 2022-04-26 PROCEDURE — 95810 POLYSOM 6/> YRS 4/> PARAM: CPT | Performed by: INTERNAL MEDICINE

## 2022-04-27 NOTE — PROGRESS NOTES
Sleep Study Documentation    Pre-Sleep Study       Sleep testing procedure explained to patient:YES    Patient napped prior to study:NO    Caffeine:Dayshift worker after 12PM   Caffeine use:NO    Alcohol:Dayshift workers after 5PM: Alcohol use:NO    Typical day for patient:YES       Study Documentation    Sleep Study Indications: Snoring, EDS    Sleep Study: Diagnostic   Snore: Moderate  Supplemental O2: no    O2 flow rate (L/min) range   O2 flow rate (L/min) final   Minimum SaO2 65%  Baseline SaO2 96%        Mode of Therapy:    EKG abnormalities: no     EEG abnormalities: no    Sleep Study Recorded < 2 hours: N/A    Sleep Study Recorded > 2 hours but incomplete study: N/A    Sleep Study Recorded 6 hours but no sleep obtained: NO    Patient classification: retired       Post-Sleep Study    Medication used at bedtime or during sleep study:NO    Patient reports time it took to fall asleep:20 to 30 minutes    Patient reports waking up during study:1 to 2 times  Patient reports returning to sleep without difficulty  Patient reports sleeping 4 to 6 hours without dreaming  Patient reports sleep during study:better than usual    Patient rated sleepiness: Somewhat sleepy or tired    PAP treatment:no

## 2022-05-04 ENCOUNTER — TELEPHONE (OUTPATIENT)
Dept: FAMILY MEDICINE CLINIC | Facility: OTHER | Age: 72
End: 2022-05-04

## 2022-05-04 DIAGNOSIS — N52.9 ERECTILE DYSFUNCTION, UNSPECIFIED ERECTILE DYSFUNCTION TYPE: ICD-10-CM

## 2022-05-04 NOTE — TELEPHONE ENCOUNTER
The patient called stating the 25 mg for sildenafil is not strong enough  He is asking if you can increase the dosage to 100 mg and to send it to the pharmacy listed below      Please advise    Thank you      [facility] [id number] [facility] [address] [facility]  [phone]

## 2022-05-05 RX ORDER — SILDENAFIL 50 MG/1
50 TABLET, FILM COATED ORAL DAILY PRN
Qty: 10 TABLET | Refills: 0 | Status: SHIPPED | OUTPATIENT
Start: 2022-05-05 | End: 2022-05-06 | Stop reason: SDUPTHER

## 2022-05-05 NOTE — TELEPHONE ENCOUNTER
We should increase gradually to use the lowest effective dose and avoid side effects  I'm sending 50 mg tablets to the pharmacy requested  Will increase dose further as needed

## 2022-05-06 ENCOUNTER — TELEPHONE (OUTPATIENT)
Dept: SLEEP CENTER | Facility: CLINIC | Age: 72
End: 2022-05-06

## 2022-05-06 DIAGNOSIS — N52.9 ERECTILE DYSFUNCTION, UNSPECIFIED ERECTILE DYSFUNCTION TYPE: ICD-10-CM

## 2022-05-06 RX ORDER — SILDENAFIL 50 MG/1
50 TABLET, FILM COATED ORAL DAILY PRN
Qty: 10 TABLET | Refills: 0 | Status: SHIPPED | OUTPATIENT
Start: 2022-05-06 | End: 2022-07-08

## 2022-05-06 RX ORDER — SILDENAFIL 50 MG/1
50 TABLET, FILM COATED ORAL DAILY PRN
Qty: 10 TABLET | Refills: 0 | Status: SHIPPED | OUTPATIENT
Start: 2022-05-06 | End: 2022-05-06 | Stop reason: SDUPTHER

## 2022-05-06 NOTE — TELEPHONE ENCOUNTER
This medication was sent to the wrong pharmacy  Can you please re-send to [facility] [address]    Thank you!

## 2022-05-06 NOTE — TELEPHONE ENCOUNTER
Left message for the patient to call back for sleep study results and that DME set up on 5/13/2022 is canceled         Sleep study shows severe MIGEL   AHI 72 1  Dr Dylon Owen ordered CPAP study patient needs to be scheduled

## 2022-05-10 ENCOUNTER — TELEPHONE (OUTPATIENT)
Dept: SLEEP CENTER | Facility: CLINIC | Age: 72
End: 2022-05-10

## 2022-05-12 ENCOUNTER — HOSPITAL ENCOUNTER (OUTPATIENT)
Dept: SLEEP CENTER | Facility: CLINIC | Age: 72
Discharge: HOME | End: 2022-05-12
Payer: COMMERCIAL

## 2022-05-12 DIAGNOSIS — G47.33 OSA (OBSTRUCTIVE SLEEP APNEA): ICD-10-CM

## 2022-05-12 PROCEDURE — 95811 POLYSOM 6/>YRS CPAP 4/> PARM: CPT | Performed by: INTERNAL MEDICINE

## 2022-05-12 PROCEDURE — 95811 POLYSOM 6/>YRS CPAP 4/> PARM: CPT

## 2022-05-13 NOTE — PROGRESS NOTES
Sleep Study Documentation    Pre-Sleep Study       Sleep testing procedure explained to patient:YES    Patient napped prior to study:NO    Caffeine:Dayshift worker after 12PM   Caffeine use:YES- tea  6 ounces    Alcohol:Dayshift workers after 5PM: Alcohol use:NO    Typical day for patient:YES       Study Documentation    Sleep Study Indications: MIGEL, hypertension, hearing loss,     Sleep Study: Treatment   Optimal PAP pressure: 21/17cm  Leak:Small  Snore:Eliminated  REM Obtained:yes  Supplemental O2: no    Minimum SaO2 77%  Baseline SaO2 93%  PAP mask tried (list all) resmed airfit F20  PAP mask choice (final) resmed airfit F20  PAP mask type:full face  PAP pressure at which snoring was eliminated 10cm  Minimum SaO2 at final PAP pressure 90%  Mode of Therapy:BiPAP  ETCO2:No  CPAP changed to BiPAP:Yes  If yes why due to persistent of hypopnea on supine    Mode of Therapy:BiPAP    EKG abnormalities: no     EEG abnormalities: no    Sleep Study Recorded < 2 hours: N/A    Sleep Study Recorded > 2 hours but incomplete study: N/A    Sleep Study Recorded 6 hours but no sleep obtained: NO    Patient classification: retired       Post-Sleep Study    Medication used at bedtime or during sleep study:NO    Patient reports time it took to fall asleep:30 to 60 minutes    Patient reports waking up during study:3 or more times  Patient reports returning to sleep without difficulty  Patient reports sleeping 4 to 6 hours without dreaming  Patient reports sleep during study:typical    Patient rated sleepiness: Somewhat sleepy or tired    PAP treatment:yes: Post PAP treatment patient reports feeling better and  would wear PAP mask at home

## 2022-05-19 ENCOUNTER — TELEPHONE (OUTPATIENT)
Dept: SLEEP CENTER | Facility: CLINIC | Age: 72
End: 2022-05-19

## 2022-05-19 NOTE — TELEPHONE ENCOUNTER
Patient left message asking for results of sleep study  Called back and left message for patient  Advised CPAP study is not yet resulted  Once it is resulted the nursing staff will contact him

## 2022-05-20 ENCOUNTER — PATIENT MESSAGE (OUTPATIENT)
Dept: SLEEP CENTER | Facility: CLINIC | Age: 72
End: 2022-05-20

## 2022-05-20 DIAGNOSIS — G47.33 OSA (OBSTRUCTIVE SLEEP APNEA): Primary | ICD-10-CM

## 2022-05-24 ENCOUNTER — TELEPHONE (OUTPATIENT)
Dept: SLEEP CENTER | Facility: CLINIC | Age: 72
End: 2022-05-24

## 2022-05-24 NOTE — TELEPHONE ENCOUNTER
Patient left message requesting a phone call back to schedule an appt for his CPAP set up  Returned patient's phone call and left message for patient to call us back to get scheduled

## 2022-06-02 ENCOUNTER — TELEPHONE (OUTPATIENT)
Dept: SLEEP CENTER | Facility: CLINIC | Age: 72
End: 2022-06-02

## 2022-06-02 NOTE — TELEPHONE ENCOUNTER
Patient is scheduled for a DME setup on Wednesday 6/8, that only leaves us 4 days get process/get authorization  Called patient and left him a message  DME setup will need to be rescheduled    DME order needs to be put into [place] so we can start his processing  Thank you

## 2022-06-08 ENCOUNTER — TELEPHONE (OUTPATIENT)
Dept: SLEEP CENTER | Facility: CLINIC | Age: 72
End: 2022-06-08

## 2022-06-08 DIAGNOSIS — I10 ESSENTIAL HYPERTENSION: ICD-10-CM

## 2022-06-08 DIAGNOSIS — I10 BENIGN ESSENTIAL HYPERTENSION: ICD-10-CM

## 2022-06-08 LAB

## 2022-06-08 RX ORDER — AMLODIPINE BESYLATE AND BENAZEPRIL HYDROCHLORIDE 5; 10 MG/1; MG/1
CAPSULE ORAL
Qty: 90 CAPSULE | Refills: 1 | Status: SHIPPED | OUTPATIENT
Start: 2022-06-08 | End: 2022-07-13 | Stop reason: SDUPTHER

## 2022-06-20 ENCOUNTER — TELEPHONE (OUTPATIENT)
Dept: SLEEP CENTER | Facility: CLINIC | Age: 72
End: 2022-06-20

## 2022-06-20 NOTE — TELEPHONE ENCOUNTER
Returned the patient's call  He left message that he feels his pressure is too high   Left call back message that compliance information is obtained from his machine for his doctor to review and nursing will call him back with recommendations

## 2022-06-21 NOTE — TELEPHONE ENCOUNTER
Patient left message stating he is having issues with his BiPAP  He feels claustrophobic  Feels like he is gasping for air and rips mask off  Returned call and left voice message  Advised message has been sent to Dr Priscilla Pennington regarding this  Once he replies with his recommendations, the nursing staff will reach out to him

## 2022-06-22 NOTE — TELEPHONE ENCOUNTER
Patient walked in to the office stating that he has tried contacting our office several times and that no one has reached out to him  Advised patient that the nursing staff has reached out to him on 6/20/22 and 6/21/22  Patient stated that he never received a phone call or a voicemail  Advised patient that the nursing staff is waiting on recommendations from Dr Audrey Herrera and will get back to him once they hear from the doctor  Also advised patient that Dr Audrey Herrera is currently on vacation  Patient requested information on the inspire device  Pamphlet given to patient

## 2022-06-25 DIAGNOSIS — G47.33 OSA (OBSTRUCTIVE SLEEP APNEA): Primary | ICD-10-CM

## 2022-06-27 NOTE — TELEPHONE ENCOUNTER
Advised patient that Dr Elo Alvarenga ordered ENT consult  Patient reports that he has appointment already scheduled  Also the Nursing number came up as spam that is why he thought we weren't calling him back

## 2022-07-13 DIAGNOSIS — I10 BENIGN ESSENTIAL HYPERTENSION: ICD-10-CM

## 2022-07-13 DIAGNOSIS — I10 ESSENTIAL HYPERTENSION: ICD-10-CM

## 2022-07-13 RX ORDER — AMLODIPINE BESYLATE AND BENAZEPRIL HYDROCHLORIDE 5; 10 MG/1; MG/1
1 CAPSULE ORAL DAILY
Qty: 5 CAPSULE | Refills: 0 | Status: SHIPPED | OUTPATIENT
Start: 2022-07-13 | End: 2022-09-19 | Stop reason: SDUPTHER

## 2022-07-13 NOTE — TELEPHONE ENCOUNTER
Patient is out of town for a few days and he forgot the bag his meds were in so he is asking for 3 days worth of the Amlodipine benazepril be  Sent to [address] [facility] [id number] (which we listed)

## 2022-07-20 ENCOUNTER — TELEPHONE (OUTPATIENT)
Dept: SLEEP CENTER | Facility: CLINIC | Age: 72
End: 2022-07-20

## 2022-07-20 NOTE — TELEPHONE ENCOUNTER
Left message for the patient to call back and schedule appointment for [facility]

## 2022-07-20 NOTE — TELEPHONE ENCOUNTER
----- Message from Ezequiel Mitchell sent at 7/19/2022  6:11 PM EDT -----  Regarding: Inspire  Patient is scheduled for DISE on 8/25/22   He needs sleep consult

## 2022-07-29 ENCOUNTER — APPOINTMENT (OUTPATIENT)
Dept: LAB | Facility: CLINIC | Age: 72
End: 2022-07-29
Payer: COMMERCIAL

## 2022-07-29 ENCOUNTER — OFFICE VISIT (OUTPATIENT)
Dept: LAB | Facility: CLINIC | Age: 72
End: 2022-07-29
Payer: COMMERCIAL

## 2022-07-29 DIAGNOSIS — Z01.818 PRE-OPERATIVE EXAMINATION: ICD-10-CM

## 2022-07-29 DIAGNOSIS — J34.89 OVERDEVELOPMENT OF NASAL BONES: ICD-10-CM

## 2022-07-29 DIAGNOSIS — G47.33 OBSTRUCTIVE SLEEP APNEA (ADULT) (PEDIATRIC): ICD-10-CM

## 2022-07-29 DIAGNOSIS — J34.2 DEVIATED NASAL SEPTUM: ICD-10-CM

## 2022-07-29 DIAGNOSIS — J34.3 HYPERTROPHY OF NASAL TURBINATES: ICD-10-CM

## 2022-07-29 LAB
ANION GAP SERPL CALCULATED.3IONS-SCNC: 5 MMOL/L (ref 4–13)
BASOPHILS # BLD AUTO: 0.05 THOUSANDS/ΜL (ref 0–0.1)
BASOPHILS NFR BLD AUTO: 1 % (ref 0–1)
BUN SERPL-MCNC: 18 MG/DL (ref 5–25)
CALCIUM SERPL-MCNC: 8.9 MG/DL (ref 8.4–10.2)
CHLORIDE SERPL-SCNC: 106 MMOL/L (ref 96–108)
CO2 SERPL-SCNC: 27 MMOL/L (ref 21–32)
CREAT SERPL-MCNC: 0.97 MG/DL (ref 0.6–1.3)
EOSINOPHIL # BLD AUTO: 0.13 THOUSAND/ΜL (ref 0–0.61)
EOSINOPHIL NFR BLD AUTO: 2 % (ref 0–6)
ERYTHROCYTE [DISTWIDTH] IN BLOOD BY AUTOMATED COUNT: 13 % (ref 11.6–15.1)
GFR SERPL CREATININE-BSD FRML MDRD: 77 ML/MIN/1.73SQ M
GLUCOSE SERPL-MCNC: 110 MG/DL (ref 65–140)
HCT VFR BLD AUTO: 43.8 % (ref 36.5–49.3)
HGB BLD-MCNC: 15.1 G/DL (ref 12–17)
IMM GRANULOCYTES # BLD AUTO: 0.01 THOUSAND/UL (ref 0–0.2)
IMM GRANULOCYTES NFR BLD AUTO: 0 % (ref 0–2)
LYMPHOCYTES # BLD AUTO: 1.08 THOUSANDS/ΜL (ref 0.6–4.47)
LYMPHOCYTES NFR BLD AUTO: 17 % (ref 14–44)
MCH RBC QN AUTO: 31.5 PG (ref 26.8–34.3)
MCHC RBC AUTO-ENTMCNC: 34.5 G/DL (ref 31.4–37.4)
MCV RBC AUTO: 91 FL (ref 82–98)
MONOCYTES # BLD AUTO: 0.52 THOUSAND/ΜL (ref 0.17–1.22)
MONOCYTES NFR BLD AUTO: 8 % (ref 4–12)
NEUTROPHILS # BLD AUTO: 4.73 THOUSANDS/ΜL (ref 1.85–7.62)
NEUTS SEG NFR BLD AUTO: 72 % (ref 43–75)
NRBC BLD AUTO-RTO: 0 /100 WBCS
PLATELET # BLD AUTO: 154 THOUSANDS/UL (ref 149–390)
PMV BLD AUTO: 11.1 FL (ref 8.9–12.7)
POTASSIUM SERPL-SCNC: 4.1 MMOL/L (ref 3.5–5.3)
RBC # BLD AUTO: 4.8 MILLION/UL (ref 3.88–5.62)
SODIUM SERPL-SCNC: 138 MMOL/L (ref 135–147)
WBC # BLD AUTO: 6.52 THOUSAND/UL (ref 4.31–10.16)

## 2022-07-29 PROCEDURE — 85025 COMPLETE CBC W/AUTO DIFF WBC: CPT

## 2022-07-29 PROCEDURE — 36415 COLL VENOUS BLD VENIPUNCTURE: CPT

## 2022-07-29 PROCEDURE — 80048 BASIC METABOLIC PNL TOTAL CA: CPT

## 2022-07-29 PROCEDURE — 93005 ELECTROCARDIOGRAM TRACING: CPT

## 2022-07-30 LAB
ATRIAL RATE: 56 BPM
P AXIS: 24 DEGREES
PR INTERVAL: 140 MS
QRS AXIS: -40 DEGREES
QRSD INTERVAL: 98 MS
QT INTERVAL: 432 MS
QTC INTERVAL: 416 MS
T WAVE AXIS: 61 DEGREES
VENTRICULAR RATE: 56 BPM

## 2022-07-30 PROCEDURE — 93010 ELECTROCARDIOGRAM REPORT: CPT | Performed by: INTERNAL MEDICINE

## 2022-08-17 ENCOUNTER — ANESTHESIA EVENT (OUTPATIENT)
Dept: PERIOP | Facility: AMBULARY SURGERY CENTER | Age: 72
End: 2022-08-17
Payer: COMMERCIAL

## 2022-08-24 NOTE — PRE-PROCEDURE INSTRUCTIONS
Pre-Surgery Instructions:   Medication Instructions    amLODIPine-benazepril (LOTREL 5-10) 5-10 MG per capsule Hold day of surgery   diphenhydrAMINE-APAP, sleep, (ACETAMINOPHEN PM PO) Take night before surgery    sildenafil (VIAGRA) 50 MG tablet Hold day of surgery  Have you had / have a sore throat? No  Have you had / have a cough less than 1 week? No  Have you had / have a fever greater than 100 0 - 100  4? No  Are you experiencing any shortness of breath? No    Review with patient via phone medications and showering instructions  Advised don't take NSAID's, ok tylenol products  Advised ASC call with surgery schedule time, nothing eat or drink after midnight  Verbalized understanding

## 2022-08-24 NOTE — ANESTHESIA PREPROCEDURE EVALUATION
Procedure:  REPAIR VESTIBULAR STENOSIS (Bilateral Nose)  SEPTOPLASTY (N/A Nose)  TURBINECTOMY (Bilateral Nose)  EXAM UNDER ANESTHESIA (EUA) Drug Induced Sleep Endoscopy (N/A Throat)    Relevant Problems   CARDIO   (+) Benign essential hypertension      PULMONARY   (+) MIGEL (obstructive sleep apnea)      Nervous and Auditory   (+) Dysfunction of left eustachian tube   (+) Hearing loss of left ear      Other   (+) CPAP (continuous positive airway pressure) dependence        Physical Exam    Airway    Mallampati score: II  TM Distance: >3 FB  Neck ROM: full     Dental       Cardiovascular  Rhythm: regular, Rate: normal,     Pulmonary  Breath sounds clear to auscultation,     Other Findings        Anesthesia Plan  ASA Score- 2     Anesthesia Type- general with ASA Monitors  Additional Monitors:   Airway Plan: ETT  Comment: Oral jeff; patient claustrophobic - wary of mask  Plan Factors-    Chart reviewed  Patient is not a current smoker  Induction- intravenous  Postoperative Plan- Plan for postoperative opioid use  Informed Consent- Anesthetic plan and risks discussed with patient  I personally reviewed this patient with the CRNA  Discussed and agreed on the Anesthesia Plan with the CRNA  Sundar Cintron

## 2022-08-25 ENCOUNTER — HOSPITAL ENCOUNTER (OUTPATIENT)
Facility: AMBULARY SURGERY CENTER | Age: 72
Setting detail: OUTPATIENT SURGERY
Discharge: HOME | End: 2022-08-25
Attending: OTOLARYNGOLOGY | Admitting: OTOLARYNGOLOGY
Payer: COMMERCIAL

## 2022-08-25 ENCOUNTER — ANESTHESIA (OUTPATIENT)
Dept: PERIOP | Facility: AMBULARY SURGERY CENTER | Age: 72
End: 2022-08-25
Payer: COMMERCIAL

## 2022-08-25 VITALS
HEART RATE: 71 BPM | TEMPERATURE: 97.5 F | HEIGHT: 68 IN | BODY MASS INDEX: 28.95 KG/M2 | RESPIRATION RATE: 18 BRPM | SYSTOLIC BLOOD PRESSURE: 159 MMHG | WEIGHT: 191 LBS | OXYGEN SATURATION: 97 % | DIASTOLIC BLOOD PRESSURE: 92 MMHG

## 2022-08-25 DIAGNOSIS — J34.89 NASAL OBSTRUCTION: Primary | Chronic | ICD-10-CM

## 2022-08-25 DIAGNOSIS — J34.3 HYPERTROPHY OF NASAL TURBINATES: ICD-10-CM

## 2022-08-25 DIAGNOSIS — J34.2 DEVIATED NASAL SEPTUM: ICD-10-CM

## 2022-08-25 DIAGNOSIS — G47.33 OSA (OBSTRUCTIVE SLEEP APNEA): Chronic | ICD-10-CM

## 2022-08-25 PROCEDURE — 42975 DISE EVAL SLP DO BRTH FLX DX: CPT | Performed by: OTOLARYNGOLOGY

## 2022-08-25 PROCEDURE — 30140 RESECT INFERIOR TURBINATE: CPT | Performed by: OTOLARYNGOLOGY

## 2022-08-25 PROCEDURE — 30520 REPAIR OF NASAL SEPTUM: CPT | Performed by: OTOLARYNGOLOGY

## 2022-08-25 PROCEDURE — 30465 REPAIR NASAL STENOSIS: CPT | Performed by: OTOLARYNGOLOGY

## 2022-08-25 RX ORDER — ACETAMINOPHEN 325 MG/1
650 TABLET ORAL EVERY 6 HOURS PRN
Status: DISCONTINUED | OUTPATIENT
Start: 2022-08-25 | End: 2022-08-25 | Stop reason: HOSPADM

## 2022-08-25 RX ORDER — OXYCODONE HYDROCHLORIDE 5 MG/1
5 TABLET ORAL EVERY 4 HOURS PRN
Qty: 10 TABLET | Refills: 0 | Status: SHIPPED | OUTPATIENT
Start: 2022-08-25 | End: 2022-09-04

## 2022-08-25 RX ORDER — PROPOFOL 10 MG/ML
INJECTION, EMULSION INTRAVENOUS AS NEEDED
Status: DISCONTINUED | OUTPATIENT
Start: 2022-08-25 | End: 2022-08-25

## 2022-08-25 RX ORDER — FENTANYL CITRATE/PF 50 MCG/ML
25 SYRINGE (ML) INJECTION
Status: DISCONTINUED | OUTPATIENT
Start: 2022-08-25 | End: 2022-08-25 | Stop reason: HOSPADM

## 2022-08-25 RX ORDER — HYDROMORPHONE HCL/PF 1 MG/ML
0.25 SYRINGE (ML) INJECTION
Status: DISCONTINUED | OUTPATIENT
Start: 2022-08-25 | End: 2022-08-25 | Stop reason: HOSPADM

## 2022-08-25 RX ORDER — LIDOCAINE HYDROCHLORIDE AND EPINEPHRINE 10; 10 MG/ML; UG/ML
INJECTION, SOLUTION INFILTRATION; PERINEURAL AS NEEDED
Status: DISCONTINUED | OUTPATIENT
Start: 2022-08-25 | End: 2022-08-25 | Stop reason: HOSPADM

## 2022-08-25 RX ORDER — DEXAMETHASONE SODIUM PHOSPHATE 10 MG/ML
INJECTION, SOLUTION INTRAMUSCULAR; INTRAVENOUS AS NEEDED
Status: DISCONTINUED | OUTPATIENT
Start: 2022-08-25 | End: 2022-08-25

## 2022-08-25 RX ORDER — GLYCOPYRROLATE 0.2 MG/ML
INJECTION INTRAMUSCULAR; INTRAVENOUS AS NEEDED
Status: DISCONTINUED | OUTPATIENT
Start: 2022-08-25 | End: 2022-08-25

## 2022-08-25 RX ORDER — HYDRALAZINE HYDROCHLORIDE 20 MG/ML
10 INJECTION INTRAMUSCULAR; INTRAVENOUS ONCE
Status: COMPLETED | OUTPATIENT
Start: 2022-08-25 | End: 2022-08-25

## 2022-08-25 RX ORDER — GINSENG 100 MG
CAPSULE ORAL AS NEEDED
Status: DISCONTINUED | OUTPATIENT
Start: 2022-08-25 | End: 2022-08-25 | Stop reason: HOSPADM

## 2022-08-25 RX ORDER — LIDOCAINE HYDROCHLORIDE 20 MG/ML
INJECTION, SOLUTION EPIDURAL; INFILTRATION; INTRACAUDAL; PERINEURAL AS NEEDED
Status: DISCONTINUED | OUTPATIENT
Start: 2022-08-25 | End: 2022-08-25

## 2022-08-25 RX ORDER — FENTANYL CITRATE 50 UG/ML
INJECTION, SOLUTION INTRAMUSCULAR; INTRAVENOUS AS NEEDED
Status: DISCONTINUED | OUTPATIENT
Start: 2022-08-25 | End: 2022-08-25

## 2022-08-25 RX ORDER — PROPOFOL 10 MG/ML
INJECTION, EMULSION INTRAVENOUS CONTINUOUS PRN
Status: DISCONTINUED | OUTPATIENT
Start: 2022-08-25 | End: 2022-08-25

## 2022-08-25 RX ORDER — MAGNESIUM HYDROXIDE 1200 MG/15ML
LIQUID ORAL AS NEEDED
Status: DISCONTINUED | OUTPATIENT
Start: 2022-08-25 | End: 2022-08-25 | Stop reason: HOSPADM

## 2022-08-25 RX ORDER — OXYCODONE HCL 5 MG/5 ML
5 SOLUTION, ORAL ORAL EVERY 4 HOURS PRN
Status: DISCONTINUED | OUTPATIENT
Start: 2022-08-25 | End: 2022-08-25 | Stop reason: HOSPADM

## 2022-08-25 RX ORDER — OXYMETAZOLINE HYDROCHLORIDE 0.05 G/100ML
SPRAY NASAL AS NEEDED
Status: DISCONTINUED | OUTPATIENT
Start: 2022-08-25 | End: 2022-08-25 | Stop reason: HOSPADM

## 2022-08-25 RX ORDER — ONDANSETRON 2 MG/ML
4 INJECTION INTRAMUSCULAR; INTRAVENOUS ONCE AS NEEDED
Status: DISCONTINUED | OUTPATIENT
Start: 2022-08-25 | End: 2022-08-25 | Stop reason: HOSPADM

## 2022-08-25 RX ORDER — SODIUM CHLORIDE, SODIUM LACTATE, POTASSIUM CHLORIDE, CALCIUM CHLORIDE 600; 310; 30; 20 MG/100ML; MG/100ML; MG/100ML; MG/100ML
75 INJECTION, SOLUTION INTRAVENOUS CONTINUOUS
Status: DISCONTINUED | OUTPATIENT
Start: 2022-08-25 | End: 2022-08-25 | Stop reason: HOSPADM

## 2022-08-25 RX ORDER — ROCURONIUM BROMIDE 10 MG/ML
INJECTION, SOLUTION INTRAVENOUS AS NEEDED
Status: DISCONTINUED | OUTPATIENT
Start: 2022-08-25 | End: 2022-08-25

## 2022-08-25 RX ORDER — ONDANSETRON 2 MG/ML
INJECTION INTRAMUSCULAR; INTRAVENOUS AS NEEDED
Status: DISCONTINUED | OUTPATIENT
Start: 2022-08-25 | End: 2022-08-25

## 2022-08-25 RX ORDER — SODIUM CHLORIDE, SODIUM LACTATE, POTASSIUM CHLORIDE, CALCIUM CHLORIDE 600; 310; 30; 20 MG/100ML; MG/100ML; MG/100ML; MG/100ML
INJECTION, SOLUTION INTRAVENOUS CONTINUOUS PRN
Status: DISCONTINUED | OUTPATIENT
Start: 2022-08-25 | End: 2022-08-25

## 2022-08-25 RX ADMIN — SODIUM CHLORIDE, SODIUM LACTATE, POTASSIUM CHLORIDE, AND CALCIUM CHLORIDE: .6; .31; .03; .02 INJECTION, SOLUTION INTRAVENOUS at 08:33

## 2022-08-25 RX ADMIN — LIDOCAINE HYDROCHLORIDE 100 MG: 20 INJECTION, SOLUTION EPIDURAL; INFILTRATION; INTRACAUDAL at 09:22

## 2022-08-25 RX ADMIN — FENTANYL CITRATE 50 MCG: 50 INJECTION, SOLUTION INTRAMUSCULAR; INTRAVENOUS at 09:12

## 2022-08-25 RX ADMIN — PROPOFOL 50 MG: 10 INJECTION, EMULSION INTRAVENOUS at 08:40

## 2022-08-25 RX ADMIN — SUGAMMADEX 200 MG: 100 INJECTION, SOLUTION INTRAVENOUS at 09:43

## 2022-08-25 RX ADMIN — ONDANSETRON 4 MG: 2 INJECTION INTRAMUSCULAR; INTRAVENOUS at 09:15

## 2022-08-25 RX ADMIN — GLYCOPYRROLATE 0.2 MCG: 0.2 INJECTION, SOLUTION INTRAMUSCULAR; INTRAVENOUS at 09:28

## 2022-08-25 RX ADMIN — DEXAMETHASONE SODIUM PHOSPHATE 10 MG: 10 INJECTION, SOLUTION INTRAMUSCULAR; INTRAVENOUS at 09:15

## 2022-08-25 RX ADMIN — FENTANYL CITRATE 50 MCG: 50 INJECTION, SOLUTION INTRAMUSCULAR; INTRAVENOUS at 09:22

## 2022-08-25 RX ADMIN — ACETAMINOPHEN 650 MG: 325 TABLET ORAL at 12:36

## 2022-08-25 RX ADMIN — FENTANYL CITRATE 25 MCG: 50 INJECTION INTRAMUSCULAR; INTRAVENOUS at 10:57

## 2022-08-25 RX ADMIN — ROCURONIUM BROMIDE 50 MG: 10 INJECTION, SOLUTION INTRAVENOUS at 09:22

## 2022-08-25 RX ADMIN — HYDRALAZINE HYDROCHLORIDE 10 MG: 20 INJECTION, SOLUTION INTRAMUSCULAR; INTRAVENOUS at 10:46

## 2022-08-25 RX ADMIN — PROPOFOL 100 MCG/KG/MIN: 10 INJECTION, EMULSION INTRAVENOUS at 08:40

## 2022-08-25 NOTE — ANESTHESIA POSTPROCEDURE EVALUATION
Post-Op Assessment Note    CV Status:  Stable  Pain Score: 0    Pain management: adequate     Mental Status:  Awake   Hydration Status:  Stable   PONV Controlled:  None   Airway Patency:  Patent      Post Op Vitals Reviewed: Yes      Staff: Anesthesiologist         No complications documented      BP     Temp     Pulse    Resp      SpO2

## 2022-08-25 NOTE — DISCHARGE INSTRUCTIONS
Drug Induced Sleep Endoscopy     WHAT YOU SHOULD KNOW:   During a drug induced sleep endsocopy (DISE), your caregiver places a scope into your nose to see inside your nose and throat  You may need a DISE to find the cause of your sleep apnea  DISE helps your caregiver diagnose your condition and create a treatment plan  You might also have surgery or other treatments during a DISE  AFTER YOU LEAVE:     Follow up with your primary healthcare provider or specialist as directed:  Write down your questions so you remember to ask them during your visits  Medicines:   Keep a current list of your medicines:  Include the amounts, and when, how, and why you take them  Take the list or the pill bottles to follow-up visits  Carry your medicine list with you in case of an emergency  Throw away old medicine lists  Use vitamins, herbs, or food supplements only as directed  Take your medicine as directed:  Call your healthcare provider if you think your medicine is not working as expected  Tell him about any medicine allergies, and if you want to quit taking or change your medicine  Nutrition:  Most people eat and drink as usual after a laryngoscopy  Ask your primary healthcare provider if you are not sure  Contact your primary healthcare provider if:  You have problems breathing or talking  You see new injuries to your teeth, lips, or tongue  Your pain does not go away or gets worse  You have questions about your procedure, medicine, or care  If you have any questions or concerns during business hours please call our office at [phone]   After hours please call the surgeon on call or Dr Daniela Lim at [phone]

## 2022-08-25 NOTE — OP NOTE
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]     * Deviated nasal septum [J34 2]     * Hypertrophy of nasal turbinates [J34 3]    Procedure(s) (LRB):  REPAIR VESTIBULAR STENOSIS (Bilateral)  SEPTOPLASTY (N/A)  TURBINECTOMY (Bilateral)  EXAM UNDER ANESTHESIA (EUA) Drug Induced Sleep Endoscopy (N/A)    Specimen(s):  * No specimens in log *    Estimated Blood Loss:   Minimal    Drains:  * No LDAs found *    Anesthesia Type:   General    Operative Indications:  Obstructive sleep apnea (adult) (pediatric) [G47 33]  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]  Deviated nasal septum [J34 2]  Hypertrophy of nasal turbinates [J34 3]      Operative Findings:  V 2 AP  O 2 AP  T 3 AP  E 3 AP    Complications:   None    Procedure and Technique:    PREOPERATIVE DIAGNOSES: Obstructive sleep apnea with positive pressure   intolerance and persistent sleep apnea after CPAP trial    POSTOPERATIVE DIAGNOSES: Same    PROCEDURES: Drug-induced sleep endoscopy (flexible fiberoptic laryngoscopy   with examination under anesthesia)  ANESTHESIA: IV sedation  ESTIMATED BLOOD LOSS: None  COMPLICATIONS: None  INDICATIONS: Emile Bautista is a 66y o  year-old male with a history of symptomatic obstructive sleep apnea, who is intolerant and unable to achieve benefit with positive pressure therapy   He presents today for drug-induced sleep endoscopy to better characterize her locations and pattern of obstruction and to predict appropriate medical and/or surgical options moving forward  FINDINGS: There was no evidence of complete concentric palatal obstruction and he does appear to be a candidate anatomically for hypoglossal nerve   stimulation therapy  DESCRIPTION OF PROCEDURE: Giovani Eddy was brought to the operating room and was anesthetized via the standard drug-induced sleep endoscopy protocol  The propofol infusion rate was startedand gradually increased until conditions that mimic sleep were gradually observed  With the patient not responsive to verbal commands, but still with spontaneous respiration, sleep disordered breathing events and associated desaturations were clearly observed    Under these conditions, the flexible endoscope was inserted to examine both sides of the nose as well as the pharynx and larynx  The VOTE score at baseline was V: 2 partial AP; O: 2 partial AP; T: 3 complete AP; E: 3 complete AP  With simulated jaw advancement and tongue advancement, the hypopharyngeal obstruction and secondarily the palatal collapse also improved  In summary, there was no evidence of complete concentric palatal obstruction and he does appear to be a candidate anatomically for hypoglossal nerve stimulation therapy  Then general endotracheal anesthesia was administered  The patient was positioned appropriately  5 mL of 1% lidocaine with 1:100,000 epinephrine was infiltrated into the marked area  The patient was prepped and draped in standard fashion  Submucous resection of the inferior turbinates was performed bilaterally as follows: The anterior portion of the inferior turbinate was incised using electrocautery  A submucoperiosteal dissection was performed using a [place] elevator  The anterior 1 5-2 cm of bone was removed with a Thamas Cull forcep  Mucosa was preserved  The turbinate was outfractured using a [place] elevator  Septoplasty was performed as follows: A left-sided hemitransfixion incision was made with a #15 blade    A submucoperichondrial plane was elevated posteriorly to the bony septum, inferiorly to the floor of the nose, and superiorly to the dorsum  An incision was made 1 0 cm from the caudal border of the septum at the maxillary crest  This was carried to the dorsal septum with a 1 5 cm dorsal strut remaining to form an L-shaped strut of cartilage to preserve septal support for the tip support  Care was taken to preserve the connection to the caudal septum to the lower lateral cartilages bilaterally  Dissection was performed septum in the same plane on the opposite side  Cartilaginous septum was removed  Bony deviations were removed using through biting instrumentation  Repair of nasal vestibular stenosis was performed as follows: The upper lateral cartilages were released from the septum, using a genoveva   grafts, measuring approximately 1 5 cm long, 1 5 mm wide, and about 2-3 mm in height were created from the septal cartilage graft  These were inserted into each side, between the upper lateral cartilage and the septum and sutured using 4-0 prolene  Care was taken to preserve the native superoinferior positioning of the upper lateral cartilages in relation to the septum  The hemitransfixion incision was closed using multiple interrupted 5-0 chromic suture  The septal space was closed using a mattressed 4-0 plane gut suture on a Jimenez needle  Laguerre splints were placed after bacitracin ointment was added  The splints were sewn in place with 4-0 nylon suture  The patient was returned to the care of Anesthesia, extubated without difficulty, and taken to the recovery area in stable condition  All instruments and sponge counts were correct at the end of the procedure  Renetta Vasquez MD, was present for and performed all key elements of the procedure       I was present for the entire procedure and A qualified resident physician was not available    Patient Disposition:  PACU  and extubated and stable      SIGNATURE: Joy Mayfield MD  DATE: August 25, 2022  TIME: 8:48 AM

## 2022-08-25 NOTE — H&P
Delon Nelson is a 66 y o male who presents for re-evaluation of nasal obstruction  He has a hx of MIGEL with AHI on sleep study from 5/1/22  He notes right sided obstruction  Has a hx of cholesteatoma  Past Medical History:   Diagnosis Date    Cataract, right eye     last assessed 04/21/2015    CPAP (continuous positive airway pressure) dependence     no currently use    Hypertension     Pneumonia     Sleep apnea        BP (!) 174/94   Pulse 66   Temp (!) 97 4 °F (36 3 °C) (Temporal)   Resp 18   Ht 5' 8" (1 727 m)   Wt 86 6 kg (191 lb)   SpO2 98%   BMI 29 04 kg/m²       Physical Exam   Constitutional: Oriented to person, place, and time  Well-developed and well-nourished, no apparent distress, non-toxic appearance  Cooperative, able to hear and answer questions without difficulty  Voice: Normal voice quality  Head: Normocephalic, atraumatic  No scars, masses or lesions  Face: Symmetric, no edema, no sinus tenderness  Eyes: Vision grossly intact, extra-ocular movement intact  Ears: External ear normal   No post-auricular erythema or tenderness  Nose:    Mucosa: Edematous   Turbinates: Hypertrophic   Septum: severe right septal deviation  There is at least 1 cm caudal strut    Internal valves: right internal valve stenosis   External valves:   Right  Left      Zone 1: 0  0      Zone 2: 0  0   Tip support: Strong   External contour: Mild left dorsal deviation   Nasal bones: No palpable fracture    Oral cavity: Dentition intact  Mucosa moist, lips normal   Tongue mobile, floor of mouth normal   Hard palate unremarkable  No masses or lesions  Oropharynx: Uvula is midline, soft palate normal   Unremarkable oropharyngeal inlet  Tonsils unremarkable  Posterior pharyngeal wall clear  No masses or lesions  Salivary glands:  Parotid glands and submandibular glands symmetric, no enlargement or tenderness  Neck: Normal laryngeal elevation with swallow  Trachea midline  No masses or lesions   No palpable adenopathy  Thyroid: normal in size, unremarkable without tenderness or palpable nodules  Pulmonary/Chest: Normal effort and rate  No respiratory distress  Clear to ausculation bilaterally  Musculoskeletal: Normal range of motion  Neurological: Cranial nerves 2-12 intact  Skin: Skin is warm and dry  Psychiatric: Normal mood and affect  CV: RRR  Abd soft NTND      A/P: Nasal obstruction: Risks and benefits were again reviewed with the patient, including but not limited to bleeding, infection, external scars, internal scars, breathing obstruction, septal perforation, external deformity, numbness or stiffness of the nasal tip, asymmetry, need for revision, numbness of the upper teeth, among others  Questions were answered  History and physical and consent were obtained today in clinic  Plan is for:    RNVS with endonasal  grafts, L strut septoplasty, SMR turbinates    Obstructive sleep apnea: We discussed the nature of obstructive sleep apnea  We discussed the natural history of sleep apnea  We discussed options for management  We discussed non-surgical management including weight loss, mandibular advancement devices, and positive airway pressure therapy including various options  We discussed that he is not tolerating his CPAP and has at least moderate MIGEL with an AHI of 72 and BMI of 28, he would like to move forward with Drug Induced Sleep Endoscopy to evaluate the source of the obstructions  We will plan for DISE and if necessary repeat sleep study if one has not been performed within 3 years  If surgical treatable causes of sleep apnea are seen such as tongue base laxity, we will consider options for surgical treatment  After the procedure we will further discuss surgical and non-surgical options, if necessary, at that time

## 2022-09-19 ENCOUNTER — OFFICE VISIT (OUTPATIENT)
Dept: FAMILY MEDICINE CLINIC | Facility: OTHER | Age: 72
End: 2022-09-19
Payer: COMMERCIAL

## 2022-09-19 VITALS
HEIGHT: 68 IN | BODY MASS INDEX: 30.4 KG/M2 | DIASTOLIC BLOOD PRESSURE: 88 MMHG | HEART RATE: 55 BPM | SYSTOLIC BLOOD PRESSURE: 124 MMHG | RESPIRATION RATE: 18 BRPM | WEIGHT: 200.6 LBS | TEMPERATURE: 98 F | OXYGEN SATURATION: 98 %

## 2022-09-19 DIAGNOSIS — I10 BENIGN ESSENTIAL HYPERTENSION: Primary | ICD-10-CM

## 2022-09-19 DIAGNOSIS — N52.9 ERECTILE DYSFUNCTION, UNSPECIFIED ERECTILE DYSFUNCTION TYPE: ICD-10-CM

## 2022-09-19 PROCEDURE — 99213 OFFICE O/P EST LOW 20 MIN: CPT | Performed by: FAMILY MEDICINE

## 2022-09-19 PROCEDURE — 1160F RVW MEDS BY RX/DR IN RCRD: CPT | Performed by: FAMILY MEDICINE

## 2022-09-19 RX ORDER — SILDENAFIL 50 MG/1
100 TABLET, FILM COATED ORAL DAILY PRN
Qty: 10 TABLET | Refills: 0 | Status: SHIPPED | OUTPATIENT
Start: 2022-09-19 | End: 2022-09-29

## 2022-09-19 RX ORDER — SILDENAFIL 50 MG/1
100 TABLET, FILM COATED ORAL DAILY PRN
Qty: 10 TABLET | Refills: 0 | Status: SHIPPED | OUTPATIENT
Start: 2022-09-19 | End: 2022-09-19 | Stop reason: SDUPTHER

## 2022-09-19 RX ORDER — AMLODIPINE BESYLATE AND BENAZEPRIL HYDROCHLORIDE 5; 10 MG/1; MG/1
1 CAPSULE ORAL DAILY
Qty: 90 CAPSULE | Refills: 3 | Status: SHIPPED | OUTPATIENT
Start: 2022-09-19

## 2022-09-19 NOTE — TELEPHONE ENCOUNTER
Please send this med Sildenafil to [facility] on [facility]       Thank you

## 2022-09-19 NOTE — PATIENT INSTRUCTIONS
Hypertension   AMBULATORY CARE:   Hypertension  is high blood pressure  Your blood pressure is the force of your blood moving against the walls of your arteries  Hypertension causes your blood pressure to get so high that your heart has to work much harder than normal  This can damage your heart  The cause of hypertension may not be known  This is called essential or primary hypertension  Hypertension caused by another medical condition, such as kidney disease, is called secondary hypertension  Common symptoms include the following:   Headache    Blurred vision    Chest pain    Dizziness or weakness    Trouble breathing    Nosebleeds    Call your local emergency number (911 in the [address] [facility]) or have someone call if:   You have chest pain  You have any of the following signs of a heart attack:      Squeezing, pressure, or pain in your chest    You may  also have any of the following:     Discomfort or pain in your back, neck, jaw, stomach, or arm    Shortness of breath    Nausea or vomiting    Lightheadedness or a sudden cold sweat    You become confused or have trouble speaking  You suddenly feel lightheaded or have trouble breathing  Seek care immediately if:   You have a severe headache or vision loss  You have weakness in an arm or leg  Call your doctor or cardiologist if:   You feel faint, dizzy, confused, or drowsy  You have been taking your blood pressure medicine but your pressure is higher than your provider says it should be  You have questions or concerns about your condition or care  What you need to know about the stages of hypertension:       Normal blood pressure is 119/79 or lower   Your healthcare provider may only check your blood pressure each year if it stays at a normal level  Elevated blood pressure is 120/79 to 129/79   This is sometimes called prehypertension  Your healthcare provider may suggest lifestyle changes to help lower your blood pressure to a normal level   He or she may then check it again in 3 to 6 months  Stage 1 hypertension is 130/80  to 139/89   Your provider may recommend lifestyle changes, medication, and checks every 3 to 6 months until your blood pressure is controlled  Stage 2 hypertension is 140/90 or higher   Your provider will recommend lifestyle changes and have you take 2 kinds of hypertension medicines  You will also need to have your blood pressure checked monthly until it is controlled  Treatment for hypertension  may include medicine to lower your blood pressure and lower your cholesterol level  A low cholesterol level helps prevent heart disease and makes it easier to control your blood pressure  Take your medicine exactly as directed  You may also need to make lifestyle changes  Manage hypertension:   Check your blood pressure at home  Avoid smoking, caffeine, and exercise at least 30 minutes before checking your blood pressure  Sit and rest for 5 minutes before you take your blood pressure  Extend your arm and support it on a flat surface  Your arm should be at the same level as your heart  Follow the directions that came with your blood pressure monitor  Check your blood pressure 2 times, 1 minute apart, before you take your medicine in the morning  Also check your blood pressure before your evening meal  Keep a record of your readings and bring it to your follow-up visits  Ask your healthcare provider what your blood pressure should be  Manage any other health conditions you have  Health conditions such as diabetes can increase your risk for hypertension  Follow your healthcare provider's instructions and take all your medicines as directed  Ask about all medicines  Certain medicines can increase your blood pressure  Examples include oral birth control pills, decongestants, herbal supplements, and NSAIDs, such as ibuprofen  Your healthcare provider can tell you which medicines are safe for you to take   This includes prescription and over-the-counter medicines  Lifestyle changes you can make to manage hypertension:  Your healthcare provider may recommend you work with a team to manage hypertension  The team may include medical experts such as a dietitian, an exercise or physical therapist, and a behavior therapist  Your family members may be included in helping you create lifestyle changes  Limit sodium (salt) as directed  Too much sodium can affect your fluid balance  Check labels to find low-sodium or no-salt-added foods  Some low-sodium foods use potassium salts for flavor  Too much potassium can also cause health problems  Your healthcare provider will tell you how much sodium and potassium are safe for you to have in a day  He or she may recommend that you limit sodium to 2,300 mg a day  Follow the meal plan recommended by your healthcare provider  A dietitian or your provider can give you more information on low-sodium plans or the DASH (Dietary Approaches to Stop Hypertension) eating plan  The DASH plan is low in sodium, processed sugar, unhealthy fats, and total fat  It is high in potassium, calcium, and fiber  These can be found in vegetables, fruit, and whole-grain foods  Be physically active throughout the day  Physical activity, such as exercise, can help control your blood pressure and your weight  Be physically active for at least 30 minutes per day, on most days of the week  Include aerobic activity, such as walking or riding a bicycle  Also include strength training at least 2 times each week  Your healthcare providers can help you create a physical activity plan  Decrease stress  This may help lower your blood pressure  Learn ways to relax, such as deep breathing or listening to music  Limit alcohol as directed  Alcohol can increase your blood pressure  A drink of alcohol is 12 ounces of beer, 5 ounces of wine, or 1½ ounces of liquor  Do not smoke    Nicotine and other chemicals in cigarettes and cigars can increase your blood pressure and also cause lung damage  Ask your healthcare provider for information if you currently smoke and need help to quit  E-cigarettes or smokeless tobacco still contain nicotine  Talk to your healthcare provider before you use these products  Follow up with your doctor or cardiologist as directed: You will need to return to have your blood pressure checked and to have other lab tests done  Write down your questions so you remember to ask them during your visits  © Copyright [employer] 2022 Information is for End User's use only and may not be sold, redistributed or otherwise used for commercial purposes  All illustrations and images included in CareNotes® are the copyrighted property of A D A M , Inc  or [facility] Scott   The above information is an  only  It is not intended as medical advice for individual conditions or treatments  Talk to your doctor, nurse or pharmacist before following any medical regimen to see if it is safe and effective for you  How to Take a Blood Pressure   AMBULATORY CARE:   Blood pressure (BP)  is the force of blood pushing on the walls of your arteries  Your BP results are written as 2 numbers  The first, or top, number is called systolic BP  This is the pressure caused by your heart pushing blood out to your body  The second, or bottom, number is called diastolic BP  This is the pressure when your heart relaxes and fills back up with blood  Ask your healthcare provider what your BP should be  For most people, a good BP goal is less than 120/80  Call your doctor if:   Your BP is higher or lower than you were told it should be  You have questions or concerns about your condition or care  Why you may need to take your BP:  You may not have any signs or symptoms of high BP  You may need to take your BP regularly to know how often your BP is high   High BP increases your risk for a stroke, heart attack, or kidney disease  You may need to take medicine to keep your BP at a normal level  Write down and keep a log of your BP  Your healthcare provider can use the BP results in your log to see if your BP medicines are working  How often to take your BP:  Your healthcare provider may recommend that you take your BP 2 times a day  Take your BP at the same times each day, such as the morning and evening  Ask your healthcare provider when and how often you should take your BP  How to take your BP:  You can take your BP at home with a digital BP monitor  Read the instructions that came with your BP monitor  The monitor comes with an adjustable cuff  Ask your healthcare provider if your cuff is the correct size  Do not eat, drink, smoke, or exercise for 30 minutes before you take your BP  Rest quietly for 5 minutes before you take your BP  Sit with your feet flat on the floor and your back against a chair  Extend your arm and support it on a flat surface  Your arm should be at the same level as your heart  Make sure all of the air is out of the cuff  Place the BP cuff against your bare skin about 1 inch (2 5 cm) above your elbow  Wrap the cuff snugly around your arm  The BP reading may not be correct if the cuff is too loose  If you are using a wrist cuff, wrap the cuff snugly around your wrist  Hold your wrist at the same level as your heart  Turn on the BP monitor and follow the directions  Write down your BP, the date, the time, and which arm you used to take the BP  Take your BP 2 times and write down both readings  Use the same arm each time  These BP readings can be 1 minute apart  What else you need to know:   Do not take a BP reading in an arm that is injured or has an IV or shunt  Take your BP medicines as directed  Do not stop taking your medicines if your BP is at your goal   A BP at your goal means your medicine is working correctly      Follow up with your doctor as directed:  Bring the log of your BP readings  Also bring the BP machine  Healthcare providers can check that you are using the machine correctly  Write down your questions so you remember to ask them during your visits  © Copyright [employer] 2022 Information is for End User's use only and may not be sold, redistributed or otherwise used for commercial purposes  All illustrations and images included in CareNotes® are the copyrighted property of A [employer] M [employer] Inc  or Murali Bergman  The above information is an  only  It is not intended as medical advice for individual conditions or treatments  Talk to your doctor, nurse or pharmacist before following any medical regimen to see if it is safe and effective for you

## 2022-09-19 NOTE — PROGRESS NOTES
Name: Ford Eduardo      : 1950      MRN: [id number]  Encounter Provider: Kika Andrea MD  Encounter Date: 2022   Encounter department: [address] [place] [address]     1  Benign essential hypertension  -     amLODIPine-benazepril (LOTREL 5-10) 5-10 MG per capsule; Take 1 capsule by mouth daily    2  Erectile dysfunction, unspecified erectile dysfunction type    BP at goal   Continue current treatment as well as healthy lifestyle choices  Will increase sildenafil to 100 mg  Counseled patient on side effects and medication interactions  Subjective      Patient presents for follow-up concerning hypertension  He states that his blood pressures at home are 120s-140s/70s-80s  He denies any symptoms including headache, vision change, shortness of breath, chest pain, dizziness or lightheadedness  He also denies any side effects of medications including leg swelling  He also asks if his sildenafil dose can be increased to 100 mg for improved effect  Review of Systems   Eyes: Negative for visual disturbance  Respiratory: Negative for shortness of breath  Cardiovascular: Negative for chest pain and leg swelling  Genitourinary: Negative for decreased urine volume  Neurological: Negative for dizziness, light-headedness and headaches  All other systems reviewed and are negative  Current Outpatient Medications on File Prior to Visit   Medication Sig    diphenhydrAMINE-APAP, sleep, (ACETAMINOPHEN PM PO) Take by mouth daily at bedtime as needed       Objective     /88   Pulse 55   Temp 98 °F (36 7 °C)   Resp 18   Ht 5' 8" (1 727 m)   Wt 91 kg (200 lb 9 6 oz)   SpO2 98%   BMI 30 50 kg/m²     Physical Exam  Vitals reviewed  Constitutional:       General: He is not in acute distress  Appearance: Normal appearance  He is well-developed  He is not diaphoretic  HENT:      Head: Normocephalic and atraumatic        Right Ear: External ear normal       Left Ear: External ear normal       Nose: Nose normal       Mouth/Throat:      Mouth: Mucous membranes are moist       Pharynx: Oropharynx is clear  Eyes:      Extraocular Movements: Extraocular movements intact  Conjunctiva/sclera: Conjunctivae normal       Pupils: Pupils are equal, round, and reactive to light  Cardiovascular:      Rate and Rhythm: Normal rate and regular rhythm  Pulses: Normal pulses  Heart sounds: Normal heart sounds  No murmur heard  No friction rub  No gallop  Pulmonary:      Effort: Pulmonary effort is normal  No respiratory distress  Breath sounds: Normal breath sounds  No stridor  No wheezing, rhonchi or rales  Abdominal:      General: Abdomen is flat  Bowel sounds are normal  There is no distension  Palpations: Abdomen is soft  Tenderness: There is no abdominal tenderness  There is no right CVA tenderness, left CVA tenderness or guarding  Musculoskeletal:         General: Normal range of motion  Cervical back: Normal range of motion and neck supple  Right lower leg: No edema  Left lower leg: No edema  Lymphadenopathy:      Cervical: No cervical adenopathy  Skin:     General: Skin is warm and dry  Findings: No rash  Neurological:      General: No focal deficit present  Mental Status: He is alert and oriented to person, place, and time     Psychiatric:         Mood and Affect: Mood normal          Behavior: Behavior normal        Fidelia Booth MD

## 2023-04-17 PROBLEM — H71.92 CHOLESTEATOMA OF LEFT EAR: Status: RESOLVED | Noted: 2021-04-15 | Resolved: 2023-04-17

## 2023-04-17 PROBLEM — J34.2 DEVIATED NASAL SEPTUM: Status: RESOLVED | Noted: 2022-08-25 | Resolved: 2023-04-17

## 2023-04-17 PROBLEM — H91.93 BILATERAL HEARING LOSS: Status: ACTIVE | Noted: 2023-04-17

## 2023-04-17 PROBLEM — H69.82 DYSFUNCTION OF LEFT EUSTACHIAN TUBE: Status: RESOLVED | Noted: 2021-09-23 | Resolved: 2023-04-17

## 2023-04-17 PROBLEM — H69.92 DYSFUNCTION OF LEFT EUSTACHIAN TUBE: Status: RESOLVED | Noted: 2021-09-23 | Resolved: 2023-04-17

## 2023-04-17 PROBLEM — H91.92 HEARING LOSS OF LEFT EAR: Status: RESOLVED | Noted: 2021-04-15 | Resolved: 2023-04-17

## 2023-05-09 ENCOUNTER — OFFICE VISIT (OUTPATIENT)
Dept: FAMILY MEDICINE CLINIC | Facility: OTHER | Age: 73
End: 2023-05-09

## 2023-05-09 VITALS
WEIGHT: 203 LBS | SYSTOLIC BLOOD PRESSURE: 124 MMHG | HEART RATE: 56 BPM | TEMPERATURE: 97.8 F | OXYGEN SATURATION: 98 % | BODY MASS INDEX: 30.77 KG/M2 | RESPIRATION RATE: 18 BRPM | HEIGHT: 68 IN | DIASTOLIC BLOOD PRESSURE: 82 MMHG

## 2023-05-09 DIAGNOSIS — M54.2 NECK PAIN: Primary | ICD-10-CM

## 2023-05-09 NOTE — PROGRESS NOTES
"Assessment/Plan:    No problem-specific Assessment & Plan notes found for this encounter  There are no diagnoses linked to this encounter  Subjective:      Patient ID: Debra Torres is a 74 y o  male      HPI  Started sat  When to [address] [place] are in [place]    Was carrying 2 chairs afterwards      Mild relief with icy hot      The following portions of the patient's history were reviewed and updated as appropriate: allergies, current medications, past family history, past medical history, past social history, past surgical history and problem list     Review of Systems      Objective:      /82   Pulse 56   Temp 97 8 °F (36 6 °C)   Resp 18   Ht 5' 8\" (1 727 m)   Wt 92 1 kg (203 lb)   SpO2 98%   BMI 30 87 kg/m²          Physical Exam    "

## 2023-05-09 NOTE — ASSESSMENT & PLAN NOTE
Patient has been experiencing this pain in the R trapezius region since Friday night/Saturday morning  Appears MSK of origin, pain with activity of neck and tender  He denies any numbness, tingling, or shooting pain down his arm  Spurling test did not produce any shooting pain, but did worsen the existing right neck ache  Icy hot pads did not provide him significant relief     - recommend voltaren gel  - recommend tylenol/ibuprofren   - patient will follow up in 2 weeks and will call if symptoms significantly worsen

## 2023-05-09 NOTE — PATIENT INSTRUCTIONS
Sending you with voltaren gel to use on your neck and shoulder  Can alternate between tylenol and ibuprofen  Up to 1000 mg tylenol every 8 hours  Up to 400 mg ibuprofen every 6 hours  Reevalute in 2 weeks    Neck Pain   AMBULATORY CARE:   Neck pain  may be sudden and increase quickly  You may instead feel pain build slowly over time  Neck pain may go away in a few days or weeks, or it may continue for months  The pain may come and go, or be worse with certain movements  The pain may be only in your neck, or it may move to your arms, back, or shoulders  You may also have pain that starts in another body area and moves to your neck  Some types of neck pain are permanent  Seek care immediately if:   You have an injury that causes neck pain and shooting pain down your arms or legs  Your neck pain suddenly becomes severe  You have neck pain along with numbness, tingling, or weakness in your arms or legs  You have a stiff neck, a headache, and a fever  Contact your healthcare provider if:   You have new or worsening symptoms  Your symptoms continue even after treatment  You have questions or concerns about your condition or care  Treatment  may include any of the following, depending on what is causing your pain:  Medicines  may be prescribed or recommended by your healthcare provider for pain  You may need medicine to treat nerve pain or to stop muscle spasms  Medicines may also be given to reduce inflammation  Your healthcare provider may inject medicine into a nerve to block pain  Over-the-counter NSAID medicine or acetaminophen may be recommended to help treat minor pain or inflammation  Traction  is used to relieve pressure from nerves  Your head is gently pulled up and away from your neck  This stretches muscles and ligaments and makes more room for the spine  Your healthcare provider will tell you the kind of traction that will help your neck pain   Do not use traction devices at home unless directed by your healthcare provider  Surgery  may be needed if the pain is severe or other treatments do not work  Surgery will not help every kind of neck pain  You may need surgery to stabilize a fractured bone or to remove a tumor  Surgery may also be used to widen a narrowed spinal column or to remove a disc from between neck bones  Manage or prevent neck pain:   Rest your neck as directed  Do not make sudden movements, such as turning your head quickly  Your healthcare provider may recommend you wear a cervical collar for a short time  The collar will prevent you from moving your head  This will give your neck time to heal if an injury is causing your neck pain  Ask your provider when you can return to sports or other normal daily activities  Apply heat as directed  Heat helps relieve pain and swelling  Use a heat wrap, or soak a small towel in warm water  Wring out the extra water  Apply the heat wrap or towel for 20 minutes every hour, or as directed  Apply ice as directed  Ice helps relieve pain and swelling, and can help prevent tissue damage  Use an ice pack, or put ice in a bag  Cover the ice pack or back with a towel before you apply it to your neck  Apply the ice pack or ice for 15 minutes every hour, or as directed  Your provider can tell you how often to apply ice  Do neck exercises as directed  Neck exercises help strengthen the muscles and increase range of motion  Your provider will tell you which exercises are right for you  The provider may give you instructions or may recommend that you work with a physical therapist  Your provider or therapist can make sure you are doing the exercises correctly  Maintain good posture  Try to keep your head and shoulders lifted when you sit  If you work in front of a computer, make sure the monitor is at the right level  You should not need to look up down to see the screen   You should also not have to lean forward to be able to read what is on the screen  Make sure your keyboard, mouse, and other computer items are placed where you do not have to extend your shoulder to reach them  Get up often if you work in front of a computer or sit for long periods of time  Stretch or walk around to keep your neck muscles loose  Follow up with your healthcare provider as directed: Your healthcare provider may refer you to a specialist if your pain does not get better with treatment  Write down your questions so you remember to ask them during your visits  © Copyright Jean [employer]es 2022 Information is for End User's use only and may not be sold, redistributed or otherwise used for commercial purposes  The above information is an  only  It is not intended as medical advice for individual conditions or treatments  Talk to your doctor, nurse or pharmacist before following any medical regimen to see if it is safe and effective for you

## 2023-05-09 NOTE — PROGRESS NOTES
Name: Libby Tierney      : 1950      MRN: [id number]  Encounter Provider: Randa Marsh MD  Encounter Date: 2023   Encounter department: [address] [place] [address]    20-year-old male with past medical history of MIGEL and HTN presents for acute atraumatic right-sided neck pain  Presentation and symptoms likely musculoskeletal in nature, ddx includes trapezius strain, inflammation >> radiculopathy  Patient counseled to start conservative management with oral and topical analgesics with light stretches and we will reevaluate in 2 weeks  1  Neck pain  Assessment & Plan:  Patient has been experiencing this pain in the R trapezius region since Friday night/Saturday morning  Appears MSK of origin, pain with activity of neck and tender  He denies any numbness, tingling, or shooting pain down his arm  Spurling test did not produce any shooting pain, but did worsen the existing right neck ache  Icy hot pads did not provide him significant relief  - recommend voltaren gel  - recommend tylenol/ibuprofren   - patient will follow up in 2 weeks and will call if symptoms significantly worsen       Orders:  -     Diclofenac Sodium (VOLTAREN) 1 %; Apply 2 g topically 4 (four) times a day         Subjective      Silvia Byrnes, a 73 yo male with PMHx of HLD, HTN, presents for acute atraumatic right-sided neck pain  His pain started on Friday night/Saturday morning  On Saturday, he traveled to [place] to see his children perform at a crew (rowing) competition and was carrying chairs, however he had the neck pain prior to lifting  He does not recall a specific inciting event, popping, or clicking noise  The pain started off mild but gradually increased throughout the course of the day  He is unsure if he woke up with the pain  He experiences mild pain at rest, but a pain of 7 or 8/10 when he tries to turn his neck to the right or look up   The pain is located primarily on the "lateral right neck, with some radiation to his shoulder blade  He describes the pain as a constant ache, and denies any tingling or shooting pains down his arm  He has tried icy hot pads, but has found no relief  He does experience relief from pain for around 20 minutes after he takes a warm shower  He has been babysitting his young grandchildren recently and claims that his pain is becoming bothersome in his daily life  Patient does not report any strenuous activity for the night before  Denies any trauma, accidents or falls  Patient has not tried oral analgesics  Patient typically sleeps on his left-side but has alternated between his sides, back and sleeping on his stomach throughout his life  Review of Systems   Constitutional: Negative for activity change, chills, fatigue and fever  HENT: Negative for ear pain, rhinorrhea, sore throat and trouble swallowing  Uses bilateral hearing aids, not with him today   Eyes: Negative for visual disturbance  Respiratory: Negative for cough and shortness of breath  Cardiovascular: Negative for chest pain, palpitations and leg swelling  Gastrointestinal: Negative for constipation, diarrhea, nausea and vomiting  Genitourinary: Negative for difficulty urinating and dysuria  Musculoskeletal: Positive for neck pain and neck stiffness  Negative for gait problem  Pain in trapezius   Skin: Negative for rash  Neurological: Positive for headaches (mild)  Negative for dizziness and numbness         Current Outpatient Medications on File Prior to Visit   Medication Sig   • amLODIPine-benazepril (LOTREL 5-10) 5-10 MG per capsule Take 1 capsule by mouth daily   • sildenafil (VIAGRA) 50 MG tablet Take 2 tablets (100 mg total) by mouth daily as needed for erectile dysfunction for up to 10 days       Objective     /82   Pulse 56   Temp 97 8 °F (36 6 °C)   Resp 18   Ht 5' 8\" (1 727 m)   Wt 92 1 kg (203 lb)   SpO2 98%   BMI 30 87 kg/m² " Physical Exam  Constitutional:       General: He is not in acute distress  Appearance: Normal appearance  HENT:      Head: Normocephalic and atraumatic  Mouth/Throat:      Mouth: Mucous membranes are moist    Eyes:      Extraocular Movements: Extraocular movements intact  Conjunctiva/sclera: Conjunctivae normal    Neck:      Comments: Active ROM intact  Pain with movement, but ROM symmetrical  Musculoskeletal:      Cervical back: Normal range of motion and neck supple  Tenderness (along right trapezius, no bony tenderness, no deltoid tenderness) present  No edema, erythema or rigidity  Pain with movement and muscular tenderness present  No spinous process tenderness  Right lower leg: No edema  Left lower leg: No edema  Comments: Spurling's test: neg  Empty can test: neg  Infraspinatus test: normal  Rotator cuff active ROM normal  Drop arm test: neg   Skin:     General: Skin is warm and dry  Neurological:      General: No focal deficit present  Mental Status: He is alert and oriented to person, place, and time  Motor: No weakness  Comments: CN XI 5/5 strength bilaterally   strength 5/5 bilaterally   Psychiatric:         Mood and Affect: Mood normal          Behavior: Behavior normal          Thought Content:  Thought content normal          Judgment: Judgment normal        Hoang Soto MD

## 2023-05-10 DIAGNOSIS — M54.2 NECK PAIN: ICD-10-CM

## 2023-05-23 ENCOUNTER — OFFICE VISIT (OUTPATIENT)
Dept: FAMILY MEDICINE CLINIC | Facility: OTHER | Age: 73
End: 2023-05-23

## 2023-05-23 VITALS
HEIGHT: 68 IN | RESPIRATION RATE: 16 BRPM | TEMPERATURE: 98 F | WEIGHT: 201 LBS | HEART RATE: 55 BPM | OXYGEN SATURATION: 97 % | SYSTOLIC BLOOD PRESSURE: 120 MMHG | BODY MASS INDEX: 30.46 KG/M2 | DIASTOLIC BLOOD PRESSURE: 70 MMHG

## 2023-05-23 DIAGNOSIS — N52.9 ERECTILE DYSFUNCTION, UNSPECIFIED ERECTILE DYSFUNCTION TYPE: ICD-10-CM

## 2023-05-23 DIAGNOSIS — M54.2 NECK PAIN: Primary | ICD-10-CM

## 2023-05-23 DIAGNOSIS — S16.1XXS: ICD-10-CM

## 2023-05-23 RX ORDER — SILDENAFIL 50 MG/1
100 TABLET, FILM COATED ORAL DAILY PRN
Qty: 10 TABLET | Refills: 0 | Status: SHIPPED | OUTPATIENT
Start: 2023-05-23 | End: 2023-06-02

## 2023-05-23 NOTE — PROGRESS NOTES
Name: Cornelia Jones      : 1950      MRN: [id number]  Encounter Provider: Sarah Garcia MD  Encounter Date: 2023   Encounter department: [address] [place] [address] Dr Flores  Neck pain  Assessment & Plan:  Symptoms improved in the past 2 weeks with conservative management but not resolved  Recommended he continues with oral and topical analgesics PRN and he is agreeable to PT referral  Patient instructed to follow up in 4 weeks or if condition worsens  Orders:  -     Ambulatory Referral to Physical Therapy; Future    2  Strain of tendon of neck, sequela  -     Ambulatory Referral to Physical Therapy; Future    3  Erectile dysfunction, unspecified erectile dysfunction type  Comments:  Refill request  Reports no side effects  Orders:  -     sildenafil (VIAGRA) 50 MG tablet; Take 2 tablets (100 mg total) by mouth daily as needed for erectile dysfunction for up to 10 days         Subjective      HPI   Mr Tor Kwon is a 75 yo presenting for follow up on right neck/shoulder pain  Pt was last seen 2 weeks ago  Pt reports his pain improved for the first week but that it has stopped improving since  He further reported some imporved mobility but still not back to baseline  He stated the pain is mostly located in his right neck now and no longer goes down towards his shoulder  He has not been actively stretching but has been trying to turn his neck as much as possible  He used voltaren gel 3 times per day and took ibuprofen approximately 3 times total   Patient reports no to mild relief with Voltaren gel  He reported mild to moderately when he used ibuprofen but he tries not to use oral analgesics  He denies any radiation of pain, shoulder pain, numbness/weakness/paresthesias, fever, chills or incontinence  Patient recently started working around the house with renovations repetitively lifting 12 to 15 pounds of 11x8x4 blocks without difficulty about a week ago    Reports that "symptoms are not worsening and does not complain of any new symptoms  Patient also requesting refill of sildenafil  Reports no dizziness or headaches with recent use  Review of Systems   Constitutional: Negative for chills, fatigue and fever  Respiratory: Negative for chest tightness and shortness of breath  Cardiovascular: Negative for chest pain and palpitations  Gastrointestinal: Negative for abdominal distention, abdominal pain, constipation and diarrhea  Endocrine: Negative for polyuria  Genitourinary: Negative for difficulty urinating and dysuria  Musculoskeletal: Positive for neck pain  Skin: Negative for rash  Neurological: Negative for dizziness, weakness, light-headedness, numbness and headaches  Current Outpatient Medications on File Prior to Visit   Medication Sig   • amLODIPine-benazepril (LOTREL 5-10) 5-10 MG per capsule Take 1 capsule by mouth daily   • Diclofenac Sodium (VOLTAREN) 1 % Apply 2 g topically 4 (four) times a day       Objective     /70   Pulse 55   Temp 98 °F (36 7 °C)   Resp 16   Ht 5' 8\" (1 727 m)   Wt 91 2 kg (201 lb)   SpO2 97%   BMI 30 56 kg/m²     Physical Exam  Constitutional:       General: He is not in acute distress  Appearance: Normal appearance  He is normal weight  HENT:      Head: Normocephalic and atraumatic  Mouth/Throat:      Mouth: Mucous membranes are moist    Eyes:      Conjunctiva/sclera: Conjunctivae normal    Neck:        Comments: Active ROM intact  Pain with movement, but ROM symmetrical  Pulmonary:      Effort: No respiratory distress  Musculoskeletal:      Right shoulder: Normal  No tenderness  Normal range of motion  Normal strength  Left shoulder: Normal  No tenderness  Normal range of motion  Normal strength  Right upper arm: Normal  No tenderness  Left upper arm: Normal  No tenderness  Cervical back: Neck supple  Tenderness present  No edema, erythema, signs of trauma or rigidity   " Pain with movement and muscular tenderness present  No spinous process tenderness  Decreased range of motion  Right lower leg: No edema  Left lower leg: No edema  Comments: Spurling's test was negative  No weakness in neck was detected  Active ROM intact  Drop arm test negative  Skin:     General: Skin is warm and dry  Neurological:      General: No focal deficit present  Mental Status: He is alert and oriented to person, place, and time  Motor: No weakness        Comments: CN XI 5/5 strength bilaterally   strength 5/5 bilaterally   Psychiatric:         Mood and Affect: Mood normal          Behavior: Behavior normal        Kaity Jim MD

## 2023-05-23 NOTE — PATIENT INSTRUCTIONS
Neck Exercises   AMBULATORY CARE:   Neck exercises  help reduce neck pain, and improve neck movement and strength  Neck exercises also help prevent long-term neck problems  Call your doctor if:   Your pain does not get better, or gets worse  You have questions or concerns about your condition, care, or exercise program     What you need to know about exercise safety:   Move slowly, gently, and smoothly  Avoid fast or jerky motions  Stand and sit the way your healthcare provider shows you  Good posture may reduce your neck pain  Check your posture often, even when you are not doing your neck exercises  Follow the exercise program recommended by your healthcare provider  He or she will tell you which exercises are best for your condition  He or she will also tell you how many repetitions to do and how often you should do the exercises  How to perform neck exercises safely:   Exercise position:  You may sit or stand while you do neck exercises  Face forward  Your shoulders should be straight and relaxed, with a good posture  Head tilts, forward and back:  Gently bow your head and try to touch your chin to your chest  Your healthcare provider may tell you to push on the back of your neck to help bow your head  Raise your chin back to the starting position  Tilt your head back as far as possible so you are looking up at the ceiling  Your healthcare provider may tell you to lift your chin to help tilt your head back  Return your head to the starting position  Head tilts, side to side:  Tilt your head, bringing your ear toward your shoulder  Then tilt your head toward the other shoulder  Head turns:  Turn your head to look over your shoulder  Tilt your chin down and try to touch it to your shoulder  Do not raise your shoulder to your chin  Face forward again  Do the same on the other side           Head rolls:  Slowly bring your chin toward your chest  Next, roll your head to the right  Your ear should be positioned over your shoulder  Hold this position for 5 seconds  Roll your head back toward your chest and to the left into the same position  Hold for 5 seconds  Gently roll your head back and around in a clockwise [place] 3 times  Next, move your head in the reverse direction (counterclockwise) in a [place] 3 times  Do not shrug your shoulders upwards while you do this exercise  Follow up with your doctor as directed:  Write down your questions so you remember to ask them during your visits  © Copyright Hayden Lu 2022 Information is for End User's use only and may not be sold, redistributed or otherwise used for commercial purposes  The above information is an  only  It is not intended as medical advice for individual conditions or treatments  Talk to your doctor, nurse or pharmacist before following any medical regimen to see if it is safe and effective for you

## 2023-05-23 NOTE — PROGRESS NOTES
"Assessment/Plan:    No problem-specific Assessment & Plan notes found for this encounter  Diagnoses and all orders for this visit:    Neck pain  -     Ambulatory Referral to Physical Therapy; Future    Strain of tendon of neck, sequela  -     Ambulatory Referral to Physical Therapy; Future    Erectile dysfunction, unspecified erectile dysfunction type  -     sildenafil (VIAGRA) 50 MG tablet; Take 2 tablets (100 mg total) by mouth daily as needed for erectile dysfunction for up to 10 days          Subjective:      Patient ID: Arturo Hernadez is a 74 y o  male      HPI  Lifting 12-15 lbs 11x8x4 started around this last week  Improved sx first week  Nothing getting worse    Ibuprofen 2-3 pills  voltaren gel - not so much  Tylenol - none    Not so much with stretching      The following portions of the patient's history were reviewed and updated as appropriate: allergies, current medications, past family history, past medical history, past social history, past surgical history and problem list     Review of Systems      Objective:      /70   Pulse 55   Temp 98 °F (36 7 °C)   Resp 16   Ht 5' 8\" (1 727 m)   Wt 91 2 kg (201 lb)   SpO2 97%   BMI 30 56 kg/m²          Physical Exam    "

## 2023-05-23 NOTE — ASSESSMENT & PLAN NOTE
Symptoms improved in the past 2 weeks with conservative management but not resolved  Recommended he continues with oral and topical analgesics PRN and he is agreeable to PT referral  Patient instructed to follow up in 4 weeks or if condition worsens

## 2023-06-14 ENCOUNTER — VBI (OUTPATIENT)
Dept: ADMINISTRATIVE | Facility: OTHER | Age: 73
End: 2023-06-14

## 2023-09-14 DIAGNOSIS — I10 BENIGN ESSENTIAL HYPERTENSION: ICD-10-CM

## 2023-09-14 RX ORDER — AMLODIPINE BESYLATE AND BENAZEPRIL HYDROCHLORIDE 5; 10 MG/1; MG/1
1 CAPSULE ORAL DAILY
Qty: 90 CAPSULE | Refills: 3 | Status: SHIPPED | OUTPATIENT
Start: 2023-09-14

## 2023-12-04 ENCOUNTER — OFFICE VISIT (OUTPATIENT)
Dept: FAMILY MEDICINE CLINIC | Facility: OTHER | Age: 73
End: 2023-12-04
Payer: COMMERCIAL

## 2023-12-04 VITALS
BODY MASS INDEX: 30.01 KG/M2 | HEIGHT: 68 IN | OXYGEN SATURATION: 98 % | RESPIRATION RATE: 15 BRPM | TEMPERATURE: 97.8 F | WEIGHT: 198 LBS | SYSTOLIC BLOOD PRESSURE: 124 MMHG | HEART RATE: 65 BPM | DIASTOLIC BLOOD PRESSURE: 70 MMHG

## 2023-12-04 DIAGNOSIS — N52.9 ERECTILE DYSFUNCTION, UNSPECIFIED ERECTILE DYSFUNCTION TYPE: ICD-10-CM

## 2023-12-04 DIAGNOSIS — S06.0X0A CONCUSSION WITHOUT LOSS OF CONSCIOUSNESS, INITIAL ENCOUNTER: Primary | ICD-10-CM

## 2023-12-04 DIAGNOSIS — R51.9 ACUTE NONINTRACTABLE HEADACHE, UNSPECIFIED HEADACHE TYPE: ICD-10-CM

## 2023-12-04 DIAGNOSIS — Z12.11 COLON CANCER SCREENING: ICD-10-CM

## 2023-12-04 PROCEDURE — 99213 OFFICE O/P EST LOW 20 MIN: CPT

## 2023-12-04 RX ORDER — SILDENAFIL 100 MG/1
100 TABLET, FILM COATED ORAL DAILY PRN
Qty: 10 TABLET | Refills: 0 | Status: SHIPPED | OUTPATIENT
Start: 2023-12-04 | End: 2023-12-14

## 2023-12-04 NOTE — PATIENT INSTRUCTIONS
Concussion   WHAT YOU NEED TO KNOW:   A concussion is a mild brain injury. It is usually caused by a bump or blow to the head from a fall, a motor vehicle crash, or a sports injury. Being shaken forcefully may cause a concussion. DISCHARGE INSTRUCTIONS:   Call your local emergency number (911 in the [place]), or have someone call if:   You cannot be woken. You have a seizure, increasing confusion, or a change in personality. Your speech becomes slurred. Return to the emergency department if:   You have sudden or new vision problems. One of your pupils is bigger than the other. You have a severe headache that does not go away. You have arm or leg weakness, numbness, or new problems with coordination. You have blood or clear fluid coming out of the ears or nose. You cannot stop vomiting. Call your doctor if:   You have nausea or are vomiting. You feel more sleepy than usual.    Your symptoms get worse. Your symptoms last longer than 6 weeks. You have questions or concerns about your condition or care. Medicines: You may need any of the following:  Anti-nausea medicine  may be given to treat nausea and vomiting. Acetaminophen  decreases pain and fever. It is available without a doctor's order. Ask how much to take and how often to take it. Follow directions. Read the labels of all other medicines you are using to see if they also contain acetaminophen, or ask your doctor or pharmacist. Acetaminophen can cause liver damage if not taken correctly. Take your medicine as directed. Contact your healthcare provider if you think your medicine is not helping or if you have side effects. Tell your provider if you are allergic to any medicine. Keep a list of the medicines, vitamins, and herbs you take. Include the amounts, and when and why you take them. Bring the list or the pill bottles to follow-up visits. Carry your medicine list with you in case of an emergency.     Self-care: Concussion symptoms usually go away within 10 days, but they may last longer. The following may be recommended to manage your symptoms:  Rest from physical and mental activities as directed. Mental activities are those that require thinking, concentration, and attention. You will need to rest until your symptoms are gone. Rest will allow you to recover from your concussion. Ask your healthcare provider when you can return to work and other daily activities. Have someone stay with you for the first 24 hours after your injury. Your healthcare provider should be contacted if your symptoms get worse, or you develop new symptoms. Do not participate in sports and physical activities until your healthcare provider says it is okay. These can make your symptoms worse or lead to another concussion. Your healthcare provider will tell you when it is okay for you to return to sports or physical activities. Ask for more information about sports concussions. Do not use heavy machinery or drive for 24 hours after your injury, or as directed. This can be dangerous and cause a serious accident. Your healthcare provider will tell you when it is safe for you to return to these activities. Prevent another concussion:   Wear protective sports equipment that fits properly. Helmets help lower your risk for a serious brain injury. Talk to your healthcare provider about ways you can decrease your risk for a concussion if you play sports. Wear your seatbelt every time you travel. This helps lower your risk for a head injury if you are in a car accident. Follow up with your doctor as directed:  Write down your questions so you remember to ask them during your visits. For more information:   [employer]  [address] , [address]  Phone: [address]  Phone: [phone]  Web Address: [employer]. org    © Copyright Angela Bazan 2023 Information is for End User's use only and may not be sold, redistributed or otherwise used for commercial purposes. The above information is an  only. It is not intended as medical advice for individual conditions or treatments. Talk to your doctor, nurse or pharmacist before following any medical regimen to see if it is safe and effective for you.

## 2023-12-04 NOTE — PROGRESS NOTES
Name: Nayan Guerrero      : 1950      MRN: [id number]  Encounter Provider: Douglas Muller MD  Encounter Date: 2023   Encounter department: [address]   Pt is a 74 YOM with PMH of hypertension and MIGEL who presents today for headaches. Patient states day before while working as a  he missed a step and went face first into a door frame. Patient stated he was lightheaded after impact, but denied any LOC or absence of memory surrounding the event. Patient stated following this he went outside and rested, and felt better shortly after impact. Since impact, patient has endorsed headache as well as minor laceration over bridge of nose with no active bleeding but no other complications. Concussion exam outlined as below, but grossly normal. Physical exam demonstrated lac to bridge of nose and minor swelling over R eyebrow but otherwise WNL. Further imaging vs OTC pain control outlined with patient, who elected for symptom mgmt and PRN follow up. Rest of care as below, return PRN for same issue or for next well. 1. Concussion without loss of consciousness, initial encounter  Chito performed, 4 points for moderate headache but no nausea, vomiting, dizziness, difficulties with mentation, amnesia, or neurologic dysfunction detected  Mood and mentation assessed, both within normal limits  Patient had appropriate orientation, immediate memory, delayed recall, concentration  Exertional maneuvers not performed at this visit  Very likely mild concussion due to head impact with some lingering symptoms of headache  As patient demonstrates no other symptoms or signs, no graded return to work needed  Patient advised on OTC pain control, renal function adequate for NSAIDs  Follow-up as needed for same issue or for next scheduled well    2.  Acute nonintractable headache, unspecified headache type  Patient endorsed persistent 7 out of 10 headache since head strike  Patient states headache is confined to the areas which struck a door frame, including bridge of nose and right orbital region  Eye movements normal, pupillary response to light appropriate, funduscopic exam demonstrates no papilledema or vascular concerns  Physical exam yields mild laceration to bridge of nose as well as mild swelling over right eye without significant tenderness to palpation  Patient advised on use of NSAIDs and return as needed for same issue    3. Erectile dysfunction, unspecified erectile dysfunction type  Comments:  Refill request. Reports no side effects. Orders:  -     sildenafil (VIAGRA) 100 mg tablet; Take 1 tablet (100 mg total) by mouth daily as needed for erectile dysfunction for up to 10 days    4. Colon cancer screening  -     Cologuard           Subjective     , missed last step during a home inspection and went head-first into doorframe   Became dizzy, went outside, noticed he was bleeding from laceration on nose   No LOC, but some dizziness  "Head isnt right"  Bad headaches   Mood ok, mentation ok, concentration ok, sleep ok, vision ok   Some "wooziness" upon standing       Review of Systems   Constitutional:  Negative for chills and fever. HENT:  Negative for ear pain and sore throat. Eyes:  Negative for pain and visual disturbance. Respiratory:  Negative for cough and shortness of breath. Cardiovascular:  Negative for chest pain and palpitations. Gastrointestinal:  Negative for abdominal pain and vomiting. Genitourinary:  Negative for dysuria and hematuria. Musculoskeletal:  Negative for arthralgias and back pain. Skin:  Negative for color change and rash. Neurological:  Positive for headaches. Negative for seizures and syncope. All other systems reviewed and are negative.     Past Medical History:   Diagnosis Date   • Cataract, right eye     last assessed 04/21/2015   • CPAP (continuous positive airway pressure) dependence     no Male currently use   • Hypertension    • Pneumonia    • Sleep apnea      Past Surgical History:   Procedure Laterality Date   • CATARACT EXTRACTION, BILATERAL     • EXAMINATION UNDER ANESTHESIA N/A 8/25/2022    Procedure: EXAM UNDER ANESTHESIA (EUA) Drug Induced Sleep Endoscopy;  Surgeon: Angi Mast MD;  Location: [facility];  Service: ENT   • MYRINGOTOMY W/ TUBES     • MS REPAIR NASAL VESTIBULAR STENOSIS Bilateral 8/25/2022    Procedure: REPAIR VESTIBULAR STENOSIS;  Surgeon: Angi Mast MD;  Location: [facility];  Service: ENT   • MS SEPTOPLASTY/SUBMUCOUS RESECJ W/WO CARTILAGE GRF N/A 8/25/2022    Procedure: SEPTOPLASTY;  Surgeon: Angi Mast MD;  Location: [facility];  Service: ENT   • MS SUBMUCOUS [place] PRTL/COMPL Bilateral 8/25/2022    Procedure: TURBINECTOMY;  Surgeon: Angi Mast MD;  Location: [facility];  Service: ENT     Family History   Problem Relation Age of Onset   • Hypertension Father    • No Known Problems Mother      Social History     Socioeconomic History   • Marital status: /Civil Union     Spouse name: None   • Number of children: None   • Years of education: 12   • Highest education level: None   Occupational History   • Occupation:    Tobacco Use   • Smoking status: Never   • Smokeless tobacco: Never   Vaping Use   • Vaping Use: Never used   Substance and Sexual Activity   • Alcohol use:  Yes     Alcohol/week: 2.0 standard drinks of alcohol     Types: 2 Shots of liquor per week   • Drug use: Never   • Sexual activity: None   Other Topics Concern   • None   Social History Narrative   • None     Social Determinants of Health     Financial Resource Strain: Low Risk  (4/14/2023)    Overall Financial Resource Strain (CARDIA)    • Difficulty of Paying Living Expenses: Not very hard   Food Insecurity: Not on file   Transportation Needs: No Transportation Needs (4/14/2023)    PRAPARE - Transportation    • Lack of Transportation (Medical): No    • Lack of Transportation (Non-Medical): No   Physical Activity: Not on file   Stress: Not on file   Social Connections: Not on file   Intimate Partner Violence: Not on file   Housing Stability: Not on file     Current Outpatient Medications on File Prior to Visit   Medication Sig   • amLODIPine-benazepril (LOTREL 5-10) 5-10 MG per capsule TAKE 1 CAPSULE BY MOUTH EVERY DAY   • Diclofenac Sodium (VOLTAREN) 1 % Apply 2 g topically 4 (four) times a day     No Known Allergies  Immunization History   Administered Date(s) Administered   • COVID-19 MODERNA VACC 0.5 ML IM 03/03/2021, 04/03/2021, 11/21/2021, 06/28/2022   • INFLUENZA 10/27/2015, 10/26/2016, 11/06/2018, 09/23/2021, 09/15/2022   • Influenza Quadrivalent Preservative Free 3 years and older IM 10/21/2014   • Influenza Split High Dose Preservative Free IM 10/27/2015, 10/26/2016   • Influenza, recombinant, quadrivalent,injectable, preservative free 09/18/2020   • Influenza, seasonal, injectable 01/15/2013, 10/29/2013   • Pneumococcal Conjugate 13-Valent 08/17/2017   • Pneumococcal Polysaccharide PPV23 05/16/2018   • Td (adult), adsorbed 10/31/2000   • Tdap 01/24/2008   • Zoster Vaccine Recombinant 08/12/2019, 11/05/2019       Objective     /70   Pulse 65   Temp 97.8 °F (36.6 °C)   Resp 15   Ht 5' 8" (1.727 m)   Wt 89.8 kg (198 lb)   SpO2 98%   BMI 30.11 kg/m²     Physical Exam  Vitals and nursing note reviewed. Constitutional:       General: He is not in acute distress. Appearance: Normal appearance. He is not ill-appearing. HENT:      Head: Normocephalic. Comments: Small laceration to bridge of nose, well healing   Minimal to moderate swelling and induration over R eyebrow at impact site, no fluctuance appreciated      Right Ear: External ear normal.      Left Ear: External ear normal.      Nose: Nose normal.      Mouth/Throat:      Mouth: Mucous membranes are moist.      Pharynx: Oropharynx is clear.    Eyes:      General: No scleral icterus. Right eye: No discharge. Left eye: No discharge. Extraocular Movements: Extraocular movements intact. Conjunctiva/sclera: Conjunctivae normal.      Pupils: Pupils are equal, round, and reactive to light. Cardiovascular:      Rate and Rhythm: Normal rate and regular rhythm. Pulses: Normal pulses. Heart sounds: Normal heart sounds. No murmur heard. No friction rub. No gallop. Pulmonary:      Effort: Pulmonary effort is normal.      Breath sounds: Normal breath sounds. No wheezing, rhonchi or rales. Abdominal:      General: Abdomen is flat. Palpations: Abdomen is soft. There is no mass. Tenderness: There is no abdominal tenderness. There is no guarding. Musculoskeletal:         General: Normal range of motion. Cervical back: Normal range of motion and neck supple. Right lower leg: No edema. Left lower leg: No edema. Lymphadenopathy:      Cervical: No cervical adenopathy. Skin:     General: Skin is warm and dry. Capillary Refill: Capillary refill takes less than 2 seconds. Neurological:      General: No focal deficit present. Mental Status: He is alert and oriented to person, place, and time. Mental status is at baseline. Cranial Nerves: No cranial nerve deficit. Sensory: No sensory deficit. Motor: No weakness. Coordination: Coordination normal.      Gait: Gait normal.      Deep Tendon Reflexes: Reflexes normal.   Psychiatric:         Mood and Affect: Mood normal.         Behavior: Behavior normal.         Thought Content:  Thought content normal.         Judgment: Judgment normal.   Purvis Sandhoff, MD

## 2023-12-26 ENCOUNTER — OFFICE VISIT (OUTPATIENT)
Dept: FAMILY MEDICINE CLINIC | Facility: OTHER | Age: 73
End: 2023-12-26
Payer: COMMERCIAL

## 2023-12-26 VITALS
HEIGHT: 68 IN | OXYGEN SATURATION: 98 % | SYSTOLIC BLOOD PRESSURE: 142 MMHG | DIASTOLIC BLOOD PRESSURE: 72 MMHG | BODY MASS INDEX: 31.37 KG/M2 | RESPIRATION RATE: 18 BRPM | WEIGHT: 207 LBS | TEMPERATURE: 97.6 F | HEART RATE: 68 BPM

## 2023-12-26 DIAGNOSIS — J01.90 ACUTE SINUSITIS, RECURRENCE NOT SPECIFIED, UNSPECIFIED LOCATION: ICD-10-CM

## 2023-12-26 DIAGNOSIS — R05.1 ACUTE COUGH: Primary | ICD-10-CM

## 2023-12-26 LAB
SARS-COV-2 AG UPPER RESP QL IA: NEGATIVE
VALID CONTROL: NORMAL

## 2023-12-26 PROCEDURE — 87811 SARS-COV-2 COVID19 W/OPTIC: CPT | Performed by: FAMILY MEDICINE

## 2023-12-26 PROCEDURE — 99213 OFFICE O/P EST LOW 20 MIN: CPT | Performed by: FAMILY MEDICINE

## 2023-12-26 RX ORDER — AMOXICILLIN 500 MG/1
500 CAPSULE ORAL EVERY 8 HOURS SCHEDULED
Qty: 21 CAPSULE | Refills: 0 | Status: SHIPPED | OUTPATIENT
Start: 2023-12-26 | End: 2024-01-02

## 2023-12-26 NOTE — PATIENT INSTRUCTIONS
Would recommend Robitussin DM for cough and congestion, Flonase daily, antihistamine such as Allegra, Claritin etc., humidifier in you room, using Cepacol or Chloraseptic or your preferred sore throat lozenge, warm tea with honey.     Sinusitis   WHAT YOU NEED TO KNOW:   Sinusitis is inflammation or infection of your sinuses. Sinusitis is most often caused by a virus. Acute sinusitis may last up to 12 weeks. Chronic sinusitis lasts longer than 12 weeks. Recurrent sinusitis means you have 4 or more infections in 1 year.        DISCHARGE INSTRUCTIONS:   Return to the emergency department if:   You have trouble breathing or wheezing that is getting worse.    You have a stiff neck, a fever, or a bad headache.     You cannot open your eye.     Your eyeball bulges out or you cannot move your eye.     You are more sleepy than normal, or you notice changes in your ability to think, move, or talk.    You have swelling of your forehead or scalp.    Call your doctor if:   You have vision changes, such as double vision.    Your eye and eyelid are red, swollen, and painful.     Your symptoms do not improve or go away after 10 days.    You have nausea and are vomiting.    Your nose is bleeding.    You have questions or concerns about your condition or care.    Medicines:  Your symptoms may go away on their own. Your healthcare provider may recommend watchful waiting for up to 10 days before starting antibiotics. You may need any of the following:  Acetaminophen  decreases pain and fever. It is available without a doctor's order. Ask how much to take and how often to take it. Follow directions. Read the labels of all other medicines you are using to see if they also contain acetaminophen, or ask your doctor or pharmacist. Acetaminophen can cause liver damage if not taken correctly.    NSAIDs , such as ibuprofen, help decrease swelling, pain, and fever. This medicine is available with or without a doctor's order. NSAIDs can cause  stomach bleeding or kidney problems in certain people. If you take blood thinner medicine, always ask your healthcare provider if NSAIDs are safe for you. Always read the medicine label and follow directions.    Nasal steroid sprays  may help decrease inflammation in your nose and sinuses.    Decongestants  help reduce swelling and drain mucus in the nose and sinuses. They may help you breathe easier.     Antihistamines  help dry mucus in the nose and relieve sneezing.     Antibiotics  help treat or prevent a bacterial infection.    Take your medicine as directed.  Contact your healthcare provider if you think your medicine is not helping or if you have side effects. Tell your provider if you are allergic to any medicine. Keep a list of the medicines, vitamins, and herbs you take. Include the amounts, and when and why you take them. Bring the list or the pill bottles to follow-up visits. Carry your medicine list with you in case of an emergency.    Self-care:   Rinse your sinuses as directed.  Use a sinus rinse device to rinse your nasal passages with a saline (salt water) solution or distilled water. Do not use tap water. This will help thin the mucus in your nose and rinse away pollen and dirt. It will also help reduce swelling so you can breathe normally.    Use a humidifier  to increase air moisture in your home. This may make it easier for you to breathe and help decrease your cough.     Sleep with your head elevated.  Place an extra pillow under your head before you go to sleep to help your sinuses drain.     Drink liquids as directed.  Ask your healthcare provider how much liquid to drink each day and which liquids are best for you. Liquids will thin the mucus in your nose and help it drain. Avoid drinks that contain alcohol or caffeine.     Do not smoke, and avoid secondhand smoke.  Nicotine and other chemicals in cigarettes and cigars can make your symptoms worse. Ask your healthcare provider for  information if you currently smoke and need help to quit. E-cigarettes or smokeless tobacco still contain nicotine. Talk to your healthcare provider before you use these products.    Prevent the spread of germs:   Wash your hands often with soap and water.  Wash your hands after you use the bathroom, change a child's diaper, or sneeze. Wash your hands before you prepare or eat food.         Stay away from people who are sick.  Some germs spread easily and quickly through contact.    Follow up with your doctor as directed:  You may be referred to an ear, nose, and throat specialist. Write down your questions so you remember to ask them during your visits.   © Copyright [employer] 2023 Information is for End User's use only and may not be sold, redistributed or otherwise used for commercial purposes.  The above information is an  only. It is not intended as medical advice for individual conditions or treatments. Talk to your doctor, nurse or pharmacist before following any medical regimen to see if it is safe and effective for you.

## 2023-12-26 NOTE — PROGRESS NOTES
"Assessment/Plan:    Acute cough  - 2 weeks ongoing occasionally productive cough of green phlegm  - Notes wife sick with similar symptoms and a father-in-law with recent case of bronchitis   - Symptoms initially starting with \"cold-like\" symptoms   - Cough worse in the morning and exacerbated with deep inhalation, talking  - Physical examination notable for clear lung sounds b/l, no egophony and symmetrical tactile fremitus b/l, notable erythema and cobblestoning of the oropharynx, sinuses NT to palpation, clear nasal discharge and mild swollen turbinates  - COVID negative       Plan  - Given length of symptoms and worsening cough will prescribe course of Amoxicillin  - Advised patient to not start antibiotic of 1 more week unless symptoms not relieved with the following recommendations  - Start Flonase daily w/ antihistamine given concern for viral induce URI inflammation with PND  - Humidifier in room at night  - Robitussin DM        Diagnoses and all orders for this visit:    Acute cough  -     POCT Rapid Covid Ag    Acute sinusitis, recurrence not specified, unspecified location  -     POCT Rapid Covid Ag  -     amoxicillin (AMOXIL) 500 mg capsule; Take 1 capsule (500 mg total) by mouth every 8 (eight) hours for 7 days          Subjective:      Patient ID: Amando Rivas is a 73 y.o. male.    73 yoM presenting with CC of 2 weeks of ongoing occasionally productive cough of clear phlegm. Patient stating he initially thought he had the cold 2 weeks ago with onset of rhinorrhea, congestion, PND that have improved since other than persistent cough. Cough worse in the morning, with deep breaths and talking. He reports minimal relief with Cepacol. He denies any associated fever, chills, nausea, vomiting, SOB, fatigue. States he has had pneumonia 5x in his life and is 99% sure this isn't it. He does note some associated headaches which he reports is likely due to the coughing. He does not some sinus drainage as well. " "Sick contact at home with wife who has similar symptoms and a father-in-law who was recently being treated for bronchitis.         The following portions of the patient's history were reviewed and updated as appropriate: allergies, current medications, past family history, past medical history, past social history, past surgical history, and problem list.    Review of Systems   Constitutional:  Positive for fatigue. Negative for activity change, appetite change, chills and fever.   HENT:  Positive for congestion (chest congestion), ear pain (mild; left ear) and postnasal drip. Negative for facial swelling, rhinorrhea, sinus pressure, sinus pain and sneezing.    Eyes:  Negative for itching.   Respiratory:  Positive for cough and shortness of breath (after coughing). Negative for wheezing.    Cardiovascular:  Positive for chest pain (suspected from coughing).   Gastrointestinal:  Negative for diarrhea, nausea and vomiting.   Endocrine: Negative for polydipsia and polyuria.   Genitourinary:  Negative for decreased urine volume.   Musculoskeletal:  Negative for arthralgias and myalgias.   Neurological:  Positive for headaches. Negative for dizziness, weakness and light-headedness.         Objective:      /72   Pulse 68   Temp 97.6 °F (36.4 °C)   Resp 18   Ht 5' 8\" (1.727 m)   Wt 93.9 kg (207 lb)   SpO2 98%   BMI 31.47 kg/m²          Physical Exam  Constitutional:       General: He is not in acute distress.     Appearance: Normal appearance. He is not ill-appearing.   HENT:      Head: Normocephalic and atraumatic.      Right Ear: External ear normal.      Left Ear: Tympanic membrane, ear canal and external ear normal.      Nose: Nose normal.   Eyes:      Extraocular Movements: Extraocular movements intact.      Conjunctiva/sclera: Conjunctivae normal.   Cardiovascular:      Rate and Rhythm: Normal rate and regular rhythm.      Pulses: Normal pulses.      Heart sounds: Normal heart sounds.   Pulmonary:      " Effort: Pulmonary effort is normal.      Breath sounds: Normal breath sounds. No wheezing, rhonchi or rales.   Abdominal:      General: Abdomen is flat. There is no distension.      Palpations: Abdomen is soft. There is no mass.      Tenderness: There is no abdominal tenderness. There is no guarding.   Musculoskeletal:      Right lower leg: No edema.      Left lower leg: No edema.   Skin:     General: Skin is warm and dry.   Neurological:      Mental Status: He is alert.      Motor: No weakness.      Gait: Gait normal.

## 2024-01-01 NOTE — ASSESSMENT & PLAN NOTE
"- 2 weeks ongoing occasionally productive cough of green phlegm  - Notes wife sick with similar symptoms and a father-in-law with recent case of bronchitis   - Symptoms initially starting with \"cold-like\" symptoms   - Cough worse in the morning and exacerbated with deep inhalation, talking  - Physical examination notable for clear lung sounds b/l, no egophony and symmetrical tactile fremitus b/l, notable erythema and cobblestoning of the oropharynx, sinuses NT to palpation, clear nasal discharge and mild swollen turbinates  - COVID negative       Plan  - Given length of symptoms and worsening cough will prescribe course of Amoxicillin  - Advised patient to not start antibiotic of 1 more week unless symptoms not relieved with the following recommendations  - Start Flonase daily w/ antihistamine given concern for viral induce URI inflammation with PND  - Humidifier in room at night  - Robitussin DM   "

## 2024-01-05 ENCOUNTER — TELEPHONE (OUTPATIENT)
Dept: FAMILY MEDICINE CLINIC | Facility: OTHER | Age: 74
End: 2024-01-05

## 2024-01-05 DIAGNOSIS — R05.1 ACUTE COUGH: Primary | ICD-10-CM

## 2024-01-05 RX ORDER — BENZONATATE 100 MG/1
100 CAPSULE ORAL 3 TIMES DAILY PRN
Qty: 20 CAPSULE | Refills: 0 | Status: SHIPPED | OUTPATIENT
Start: 2024-01-05

## 2024-01-05 NOTE — TELEPHONE ENCOUNTER
Yeah, hi, this is IMANI Webber. And I'm calling because I was in there Tuesday after Christmas to see the Doctor because I have. I don't know why he eventually said it was a sinus infection. Anyway, I'm not any better and I've taken all the medicine And I need to talk to him about either getting more medicine or trying different medicine or something. My number is [phone]. And again, it's BELA Webber. Thank you.  Please advice

## 2024-01-17 ENCOUNTER — TELEPHONE (OUTPATIENT)
Dept: FAMILY MEDICINE CLINIC | Facility: OTHER | Age: 74
End: 2024-01-17

## 2024-01-17 NOTE — TELEPHONE ENCOUNTER
Spoke with patient regarding [facility].  Will call to acquire records for doctor's review for next steps.      [facility] stated they will send records over now.

## 2024-01-17 NOTE — TELEPHONE ENCOUNTER
Yeah, hi, my name is Alan Stevens, BELA birthday 1950. My phone number is [phone]. I'm calling because on I think one day you sent me to get an X-ray on my hand because I had pain, which I gotta fall. Anyway, I explained a lot, but now I've been too patient. [facility] they took X-rays and they said I'm supposed to go to some orthopedic surgeon, I guess, but they could find a fracture. I was wondering if you guys take a look at this or call me back and tell me what to do, if I should go where they send me or do you want to send me somewhere who's in our maybe in our coverage or something? Anyway, I'll wait for your call. Thank you very much.

## 2024-01-18 ENCOUNTER — TELEPHONE (OUTPATIENT)
Dept: FAMILY MEDICINE CLINIC | Facility: OTHER | Age: 74
End: 2024-01-18

## 2024-01-18 NOTE — TELEPHONE ENCOUNTER
Xray results available in media - pt called back and would like to know if he can remove his cast.  Please advise.

## 2024-01-18 NOTE — TELEPHONE ENCOUNTER
"Patient called back and would like to know what his results are of his hand xray that he had done at [facility]   The results are in the \"media' section of the chart     Please call patient with result     Thank you   "

## 2024-01-19 ENCOUNTER — OFFICE VISIT (OUTPATIENT)
Dept: OBGYN CLINIC | Facility: CLINIC | Age: 74
End: 2024-01-19
Payer: COMMERCIAL

## 2024-01-19 ENCOUNTER — HOSPITAL ENCOUNTER (OUTPATIENT)
Dept: RADIOLOGY | Facility: HOSPITAL | Age: 74
End: 2024-01-19
Attending: STUDENT IN AN ORGANIZED HEALTH CARE EDUCATION/TRAINING PROGRAM
Payer: COMMERCIAL

## 2024-01-19 VITALS — HEIGHT: 68 IN | WEIGHT: 207 LBS | BODY MASS INDEX: 31.37 KG/M2

## 2024-01-19 DIAGNOSIS — M65.4 DE QUERVAIN'S TENOSYNOVITIS, LEFT: Primary | ICD-10-CM

## 2024-01-19 DIAGNOSIS — M25.532 PAIN IN LEFT WRIST: ICD-10-CM

## 2024-01-19 DIAGNOSIS — M65.332 TRIGGER MIDDLE FINGER OF LEFT HAND: ICD-10-CM

## 2024-01-19 PROCEDURE — 99204 OFFICE O/P NEW MOD 45 MIN: CPT | Performed by: STUDENT IN AN ORGANIZED HEALTH CARE EDUCATION/TRAINING PROGRAM

## 2024-01-19 PROCEDURE — 20550 NJX 1 TENDON SHEATH/LIGAMENT: CPT | Performed by: STUDENT IN AN ORGANIZED HEALTH CARE EDUCATION/TRAINING PROGRAM

## 2024-01-19 PROCEDURE — 73110 X-RAY EXAM OF WRIST: CPT

## 2024-01-19 RX ORDER — BUPIVACAINE HYDROCHLORIDE 2.5 MG/ML
1 INJECTION, SOLUTION INFILTRATION; PERINEURAL
Status: COMPLETED | OUTPATIENT
Start: 2024-01-19 | End: 2024-01-19

## 2024-01-19 RX ORDER — BETAMETHASONE SODIUM PHOSPHATE AND BETAMETHASONE ACETATE 3; 3 MG/ML; MG/ML
6 INJECTION, SUSPENSION INTRA-ARTICULAR; INTRALESIONAL; INTRAMUSCULAR; SOFT TISSUE
Status: COMPLETED | OUTPATIENT
Start: 2024-01-19 | End: 2024-01-19

## 2024-01-19 RX ORDER — BUPIVACAINE HYDROCHLORIDE 2.5 MG/ML
1 INJECTION, SOLUTION EPIDURAL; INFILTRATION; INTRACAUDAL
Status: COMPLETED | OUTPATIENT
Start: 2024-01-19 | End: 2024-01-19

## 2024-01-19 RX ADMIN — BUPIVACAINE HYDROCHLORIDE 1 ML: 2.5 INJECTION, SOLUTION INFILTRATION; PERINEURAL at 14:30

## 2024-01-19 RX ADMIN — BETAMETHASONE SODIUM PHOSPHATE AND BETAMETHASONE ACETATE 6 MG: 3; 3 INJECTION, SUSPENSION INTRA-ARTICULAR; INTRALESIONAL; INTRAMUSCULAR; SOFT TISSUE at 14:30

## 2024-01-19 RX ADMIN — BUPIVACAINE HYDROCHLORIDE 1 ML: 2.5 INJECTION, SOLUTION EPIDURAL; INFILTRATION; INTRACAUDAL at 14:30

## 2024-01-19 NOTE — PROGRESS NOTES
ORTHOPAEDIC HAND, WRIST, AND ELBOW OFFICE  VISIT      ASSESSMENT/PLAN:      Diagnoses and all orders for this visit:    Pain in left wrist  -     XR wrist 3+ vw left; Future          73 y.o. male with ***  Treatment options and expected outcomes were discussed.  The patient verbalized understanding of exam findings and treatment plan.   The patient was given the opportunity to ask questions.  Questions were answered to the patient's satisfaction.  The patient decided to move forward with ***via shared decision making.      Follow Up:  {gsfollowup:81577}       To Do Next Visit:  {To do next visit:72335}      Discussions:  {gsdiscussions:95763}      Ed Amin MD  Attending, Orthopaedic Surgery  Hand, Wrist, and Elbow Surgery  [facility]    ______________________________________________________________________________________________    CHIEF COMPLAINT:  Chief Complaint   Patient presents with    Left Wrist - Pain       SUBJECTIVE:  Patient is a 73 y.o. ***HD male who presents today for evaluation and treatment of ***      Occupation: ***     I have personally reviewed all the relevant PMH, PSH, SH, FH, Medications and allergies      PAST MEDICAL HISTORY:  Past Medical History:   Diagnosis Date    Cataract, right eye     last assessed 04/21/2015    CPAP (continuous positive airway pressure) dependence     no currently use    Hypertension     Pneumonia     Sleep apnea        PAST SURGICAL HISTORY:  Past Surgical History:   Procedure Laterality Date    CATARACT EXTRACTION, BILATERAL      EXAMINATION UNDER ANESTHESIA N/A 8/25/2022    Procedure: EXAM UNDER ANESTHESIA (EUA) Drug Induced Sleep Endoscopy;  Surgeon: Tin Rodriguez MD;  Location: [facility] MAIN OR;  Service: ENT    MYRINGOTOMY W/ TUBES      VT REPAIR NASAL VESTIBULAR STENOSIS Bilateral 8/25/2022    Procedure: REPAIR VESTIBULAR STENOSIS;  Surgeon: Tin Rodriguez MD;  Location: [facility] MAIN OR;  Service: ENT    VT  "SEPTOPLASTY/SUBMUCOUS RESECJ W/WO CARTILAGE GRF N/A 8/25/2022    Procedure: SEPTOPLASTY;  Surgeon: Tin Rodriguez MD;  [facility];  Service: ENT    LA SUBMUCOUS RESCJ INFERIOR TURBINATE PRTL/COMPL Bilateral 8/25/2022    Procedure: TURBINECTOMY;  Surgeon: Tin Rodriguez MD;  [facility];  Service: ENT       FAMILY HISTORY:  Family History   Problem Relation Age of Onset    Hypertension Father     No Known Problems Mother        SOCIAL HISTORY:  Social History     Tobacco Use    Smoking status: Never    Smokeless tobacco: Never   Vaping Use    Vaping status: Never Used   Substance Use Topics    Alcohol use: Yes     Alcohol/week: 2.0 standard drinks of alcohol     Types: 2 Shots of liquor per week    Drug use: Never       MEDICATIONS:    Current Outpatient Medications:     amLODIPine-benazepril (LOTREL 5-10) 5-10 MG per capsule, TAKE 1 CAPSULE BY MOUTH EVERY DAY, Disp: 90 capsule, Rfl: 3    benzonatate (TESSALON PERLES) 100 mg capsule, Take 1 capsule (100 mg total) by mouth 3 (three) times a day as needed for cough, Disp: 20 capsule, Rfl: 0    Diclofenac Sodium (VOLTAREN) 1 %, Apply 2 g topically 4 (four) times a day (Patient not taking: Reported on 1/19/2024), Disp: 50 g, Rfl: 1    sildenafil (VIAGRA) 100 mg tablet, Take 1 tablet (100 mg total) by mouth daily as needed for erectile dysfunction for up to 10 days, Disp: 10 tablet, Rfl: 0    ALLERGIES:  No Known Allergies        REVIEW OF SYSTEMS:  Musculoskeletal:        As noted in HPI.   All other systems reviewed and are negative.    VITALS:  There were no vitals filed for this visit.    LABS:  HgA1c: No results found for: \"HGBA1C\"  BMP:   Lab Results   Component Value Date    CALCIUM 8.9 07/29/2022    K 4.1 07/29/2022    CO2 27 07/29/2022     07/29/2022    BUN 18 07/29/2022    CREATININE 0.97 07/29/2022       _____________________________________________________  PHYSICAL EXAMINATION:  General: Well developed and well nourished, " "alert & oriented x 3, appears comfortable  Psychiatric: Normal  HEENT: Normocephalic, Atraumatic Trachea Midline, No torticollis  Pulmonary: No audible wheezing or respiratory distress   Abdomen/GI: Non tender, non distended   Cardiovascular: No pitting edema, 2+ radial pulse   Skin: {Skin exam:19820}  Neurovascular: {Nerve Exam:24616::\"Sensation Intact to the Median, Ulnar, Radial Nerve\",\"Motor Intact to the Median, Ulnar, Radial Nerve\",\"Pulses Intact\"}  Musculoskeletal: Normal, except as noted in detailed exam and in HPI.      MUSCULOSKELETAL EXAMINATION:  ***    ___________________________________________________  STUDIES REVIEWED:  {gsstudiesreviewed:47793}         PROCEDURES PERFORMED:  Procedures  {Was Procdoc done:18053::\"No Procedures performed today\"}    _____________________________________________________      Scribe Attestation      I,:   am acting as a scribe while in the presence of the attending physician.:       I,:   personally performed the services described in this documentation    as scribed in my presence.:           "

## 2024-01-19 NOTE — PROGRESS NOTES
ORTHOPAEDIC HAND, WRIST, AND ELBOW OFFICE  VISIT      ASSESSMENT/PLAN:      Diagnoses and all orders for this visit:    De Quervain's tenosynovitis, left  -     XR wrist 3+ vw left; Future  -     Hand/upper extremity injection: L extensor compartment 1  -     bupivacaine (MARCAINE) 0.25 % injection 1 mL  -     betamethasone acetate-betamethasone sodium phosphate (CELESTONE) injection 6 mg    Trigger middle finger of left hand  -     Hand/upper extremity injection: L long A1  -     bupivacaine (PF) (MARCAINE) 0.25 % injection 1 mL  -     betamethasone acetate-betamethasone sodium phosphate (CELESTONE) injection 6 mg          73 y.o. male with left wrist de quervain's and left long trigger finger   Treatment options and expected outcomes were discussed.  X-rays were reviewed in the office today which demonstrate no fractures or dislocations.  Treatment options were discussed in the form of bracing and steroid injections.   The patient verbalized understanding of exam findings and treatment plan.   The patient was given the opportunity to ask questions.  Questions were answered to the patient's satisfaction.  The patient decided to move forward with injections via shared decision making.  The patient consented and underwent a left long trigger finger injection and left 1st dorsal compartment steroid injection in the office today without any complications.  The patient was shown a thumb spica brace in the office today he can get OTC and wear as needed and at night.  He will follow up in 6 weeks for repeat evaluation.       Follow Up:  6 weeks       To Do Next Visit:  Re-evaluation of current issue      Discussions:  De Quervain Tenosynovitis: The anatomy and physiology of de Quervain's tenosynovitis was discussed with the patient today in the office.  Edema and increased contact pressure within the first dorsal extensor compartment at the radial styloid can cause pain, crepitation, and limitation of function.  Treatment  options include resting thumb spica splints to decrease edema, oral anti-inflammatory medications, home or formal therapy exercises, up to 2 steroid injections within the first dorsal extensor compartment, or surgical release.  While majority of patients do respond to conservative treatment, up to 20% may require surgical release.  and Trigger Finger: The anatomy and physiology of trigger finger was discussed with the patient today in the office.  Edema and increased contact pressure within the flexor tendons at the A1 pulley can cause pain, crepitation, and triggering or locking of the digit resulting in limitation of function.  Symptoms can occur at anytime but are typically worse in the morning or after a brief rest from repetitive activity.  Treatment options include resting/nighttime MP blocking splints to decrease edema, oral anti-inflammatory medications, home or formal therapy exercises, up to 2 steroid injections within the tendon sheath, or surgical release.  While majority of patients do respond to conservative treatment, up to 20% may require surgical release.       Ed Amin MD  Attending, Orthopaedic Surgery  Hand, Wrist, and Elbow Surgery  [facility]    ______________________________________________________________________________________________    CHIEF COMPLAINT:  Chief Complaint   Patient presents with   • Left Wrist - Pain       SUBJECTIVE:  Patient is a 73 y.o. RHD male who presents today for evaluation and treatment of left wrist pain. The patient ia a  and states before thanksgiving he missed a step while inspecting a home. The patient states his wrist pain started a few weeks later. He notes pain mostly to the base of his thumb. He also notes some stiffness and pain in the hand. The patient also notes locking to his left long finger. The patient was evaluated by [facility] on Monday where x-rays were taken and he was placed in a splint. He has  not been taking anything OTC for pain. He denies prior surgery.    Occupation:       I have personally reviewed all the relevant PMH, PSH, SH, FH, Medications and allergies      PAST MEDICAL HISTORY:  Past Medical History:   Diagnosis Date   • Cataract, right eye     last assessed 04/21/2015   • CPAP (continuous positive airway pressure) dependence     no currently use   • Hypertension    • Pneumonia    • Sleep apnea        PAST SURGICAL HISTORY:  Past Surgical History:   Procedure Laterality Date   • CATARACT EXTRACTION, BILATERAL     • EXAMINATION UNDER ANESTHESIA N/A 8/25/2022    Procedure: EXAM UNDER ANESTHESIA (EUA) Drug Induced Sleep Endoscopy;  Surgeon: Tin Rodriguez MD;  [facility];  Service: ENT   • MYRINGOTOMY W/ TUBES     • FL REPAIR NASAL VESTIBULAR STENOSIS Bilateral 8/25/2022    Procedure: REPAIR VESTIBULAR STENOSIS;  Surgeon: Tin Rodriguez MD;  [facility];  Service: ENT   • FL SEPTOPLASTY/SUBMUCOUS RESECJ W/WO CARTILAGE GRF N/A 8/25/2022    Procedure: SEPTOPLASTY;  Surgeon: Tin Rdoriguez MD;  [facility];  Service: ENT   • FL SUBMUCOUS RESCJ INFERIOR TURBINATE PRTL/COMPL Bilateral 8/25/2022    Procedure: TURBINECTOMY;  Surgeon: Tin Rodriguez MD;  [facility];  Service: ENT       FAMILY HISTORY:  Family History   Problem Relation Age of Onset   • Hypertension Father    • No Known Problems Mother        SOCIAL HISTORY:  Social History     Tobacco Use   • Smoking status: Never   • Smokeless tobacco: Never   Vaping Use   • Vaping status: Never Used   Substance Use Topics   • Alcohol use: Yes     Alcohol/week: 2.0 standard drinks of alcohol     Types: 2 Shots of liquor per week   • Drug use: Never       MEDICATIONS:    Current Outpatient Medications:   •  amLODIPine-benazepril (LOTREL 5-10) 5-10 MG per capsule, TAKE 1 CAPSULE BY MOUTH EVERY DAY, Disp: 90 capsule, Rfl: 3  •  benzonatate (TESSALON PERLES) 100 mg capsule, Take 1  "capsule (100 mg total) by mouth 3 (three) times a day as needed for cough, Disp: 20 capsule, Rfl: 0  •  Diclofenac Sodium (VOLTAREN) 1 %, Apply 2 g topically 4 (four) times a day (Patient not taking: Reported on 1/19/2024), Disp: 50 g, Rfl: 1  •  sildenafil (VIAGRA) 100 mg tablet, Take 1 tablet (100 mg total) by mouth daily as needed for erectile dysfunction for up to 10 days, Disp: 10 tablet, Rfl: 0  No current facility-administered medications for this visit.    ALLERGIES:  No Known Allergies        REVIEW OF SYSTEMS:  Musculoskeletal:        As noted in HPI.   All other systems reviewed and are negative.    VITALS:  There were no vitals filed for this visit.    LABS:  HgA1c: No results found for: \"HGBA1C\"  BMP:   Lab Results   Component Value Date    CALCIUM 8.9 07/29/2022    K 4.1 07/29/2022    CO2 27 07/29/2022     07/29/2022    BUN 18 07/29/2022    CREATININE 0.97 07/29/2022       _____________________________________________________  PHYSICAL EXAMINATION:  General: Well developed and well nourished, alert & oriented x 3, appears comfortable  Psychiatric: Normal  HEENT: Normocephalic, Atraumatic Trachea Midline, No torticollis  Pulmonary: No audible wheezing or respiratory distress   Abdomen/GI: Non tender, non distended   Cardiovascular: No pitting edema, 2+ radial pulse   Skin: No masses, erythema, lacerations, fluctation, ulcerations  Neurovascular: Sensation Intact to the Median, Ulnar, Radial Nerve, Motor Intact to the Median, Ulnar, Radial Nerve, and Pulses Intact  Musculoskeletal: Normal, except as noted in detailed exam and in HPI.      MUSCULOSKELETAL EXAMINATION:  Left wrist  Mild TTP 1st dorsal compartment   Mild TTP thumb CMC  - CMC grind  + finkelstein   Full fist  Obvious locking small finger     ___________________________________________________  STUDIES REVIEWED:  Xrays of the left wrist were reviewed and independently interpreted in PACS by Dr. Amin and demonstrate no fractures or " "dislocations.           PROCEDURES PERFORMED:  Hand/upper extremity injection: L long A1  Universal Protocol:  Consent: Verbal consent obtained.  Risks and benefits: risks, benefits and alternatives were discussed  Consent given by: patient  Time out: Immediately prior to procedure a \"time out\" was called to verify the correct patient, procedure, equipment, support staff and site/side marked as required.  Patient understanding: patient states understanding of the procedure being performed  Site marked: the operative site was marked  Patient identity confirmed: verbally with patient  Supporting Documentation  Indications: tendon swelling   Procedure Details  Condition:trigger finger Location: long finger - L long A1   Preparation: Patient was prepped and draped in the usual sterile fashion  Needle size: 25 G  Approach: volar  Medications administered: 6 mg betamethasone acetate-betamethasone sodium phosphate 6 (3-3) mg/mL; 1 mL bupivacaine (PF) 0.25 %  Patient tolerance: patient tolerated the procedure well with no immediate complications  Dressing:  Sterile dressing applied       Hand/upper extremity injection: L extensor compartment 1  Universal Protocol:  Consent: Verbal consent obtained.  Consent given by: patient  Patient identity confirmed: verbally with patient  Supporting Documentation  Indications: pain   Procedure Details  Condition:de Quervain's tenosynovitis Site: L extensor compartment 1   Preparation: Patient was prepped and draped in the usual sterile fashion  Needle size: 25 G  Ultrasound guidance: no  Medications administered: 1 mL bupivacaine 0.25 %; 6 mg betamethasone acetate-betamethasone sodium phosphate 6 (3-3) mg/mL  Patient tolerance: patient tolerated the procedure well with no immediate complications  Dressing:  Sterile dressing applied              _____________________________________________________      Scribe Attestation    I,:  Stephani Burden MA am acting as a scribe while in the " presence of the attending physician.:       I,:  Ed Amin MD personally performed the services described in this documentation    as scribed in my presence.:

## 2024-02-09 ENCOUNTER — VBI (OUTPATIENT)
Dept: ADMINISTRATIVE | Facility: OTHER | Age: 74
End: 2024-02-09

## 2024-02-21 PROBLEM — R05.1 ACUTE COUGH: Status: RESOLVED | Noted: 2023-12-26 | Resolved: 2024-02-21

## 2024-04-25 ENCOUNTER — TELEPHONE (OUTPATIENT)
Age: 74
End: 2024-04-25

## 2024-04-25 ENCOUNTER — HOSPITAL ENCOUNTER (EMERGENCY)
Facility: HOSPITAL | Age: 74
Discharge: HOME | End: 2024-04-25
Attending: EMERGENCY MEDICINE
Payer: COMMERCIAL

## 2024-04-25 ENCOUNTER — APPOINTMENT (EMERGENCY)
Dept: CT IMAGING | Facility: HOSPITAL | Age: 74
End: 2024-04-25
Payer: COMMERCIAL

## 2024-04-25 ENCOUNTER — NURSE TRIAGE (OUTPATIENT)
Age: 74
End: 2024-04-25

## 2024-04-25 ENCOUNTER — OFFICE VISIT (OUTPATIENT)
Age: 74
End: 2024-04-25
Payer: COMMERCIAL

## 2024-04-25 VITALS
SYSTOLIC BLOOD PRESSURE: 172 MMHG | HEIGHT: 68 IN | BODY MASS INDEX: 30.65 KG/M2 | HEART RATE: 60 BPM | OXYGEN SATURATION: 97 % | DIASTOLIC BLOOD PRESSURE: 90 MMHG | RESPIRATION RATE: 18 BRPM | TEMPERATURE: 98 F | WEIGHT: 202.2 LBS

## 2024-04-25 VITALS
HEART RATE: 56 BPM | RESPIRATION RATE: 18 BRPM | TEMPERATURE: 97.5 F | DIASTOLIC BLOOD PRESSURE: 94 MMHG | OXYGEN SATURATION: 97 % | SYSTOLIC BLOOD PRESSURE: 179 MMHG

## 2024-04-25 DIAGNOSIS — R55 NEAR SYNCOPE: ICD-10-CM

## 2024-04-25 DIAGNOSIS — R42 DIZZINESS: ICD-10-CM

## 2024-04-25 DIAGNOSIS — M54.12 CERVICAL RADICULOPATHY AT C5: ICD-10-CM

## 2024-04-25 DIAGNOSIS — E78.5 HYPERLIPIDEMIA, UNSPECIFIED HYPERLIPIDEMIA TYPE: ICD-10-CM

## 2024-04-25 DIAGNOSIS — M54.2 NECK PAIN, MUSCULOSKELETAL: ICD-10-CM

## 2024-04-25 DIAGNOSIS — R00.1 BRADYCARDIA, SINUS: ICD-10-CM

## 2024-04-25 DIAGNOSIS — I10 UNCONTROLLED HYPERTENSION: Primary | ICD-10-CM

## 2024-04-25 DIAGNOSIS — I45.10 RBBB: ICD-10-CM

## 2024-04-25 DIAGNOSIS — I48.91 ATRIAL FIBRILLATION, NEW ONSET (HCC): ICD-10-CM

## 2024-04-25 LAB
2HR DELTA HS TROPONIN: -1 NG/L
ALBUMIN SERPL BCP-MCNC: 3.9 G/DL (ref 3.5–5)
ALP SERPL-CCNC: 58 U/L (ref 34–104)
ALT SERPL W P-5'-P-CCNC: 16 U/L (ref 7–52)
ANION GAP SERPL CALCULATED.3IONS-SCNC: 6 MMOL/L (ref 4–13)
APTT PPP: 29 SECONDS (ref 23–37)
AST SERPL W P-5'-P-CCNC: 17 U/L (ref 13–39)
ATRIAL RATE: 55 BPM
ATRIAL RATE: 56 BPM
BASOPHILS # BLD AUTO: 0.06 THOUSANDS/ÂΜL (ref 0–0.1)
BASOPHILS NFR BLD AUTO: 1 % (ref 0–1)
BILIRUB SERPL-MCNC: 0.64 MG/DL (ref 0.2–1)
BUN SERPL-MCNC: 12 MG/DL (ref 5–25)
CALCIUM SERPL-MCNC: 9.1 MG/DL (ref 8.4–10.2)
CARDIAC TROPONIN I PNL SERPL HS: 6 NG/L
CARDIAC TROPONIN I PNL SERPL HS: 7 NG/L
CHLORIDE SERPL-SCNC: 108 MMOL/L (ref 96–108)
CO2 SERPL-SCNC: 25 MMOL/L (ref 21–32)
CREAT SERPL-MCNC: 0.94 MG/DL (ref 0.6–1.3)
EOSINOPHIL # BLD AUTO: 0.15 THOUSAND/ÂΜL (ref 0–0.61)
EOSINOPHIL NFR BLD AUTO: 3 % (ref 0–6)
ERYTHROCYTE [DISTWIDTH] IN BLOOD BY AUTOMATED COUNT: 12.9 % (ref 11.6–15.1)
GFR SERPL CREATININE-BSD FRML MDRD: 78 ML/MIN/1.73SQ M
GLUCOSE SERPL-MCNC: 118 MG/DL (ref 65–140)
HCT VFR BLD AUTO: 47 % (ref 36.5–49.3)
HGB BLD-MCNC: 16.1 G/DL (ref 12–17)
IMM GRANULOCYTES # BLD AUTO: 0.01 THOUSAND/UL (ref 0–0.2)
IMM GRANULOCYTES NFR BLD AUTO: 0 % (ref 0–2)
INR PPP: 1 (ref 0.84–1.19)
LYMPHOCYTES # BLD AUTO: 1.13 THOUSANDS/ÂΜL (ref 0.6–4.47)
LYMPHOCYTES NFR BLD AUTO: 21 % (ref 14–44)
MAGNESIUM SERPL-MCNC: 2 MG/DL (ref 1.9–2.7)
MCH RBC QN AUTO: 31.2 PG (ref 26.8–34.3)
MCHC RBC AUTO-ENTMCNC: 34.3 G/DL (ref 31.4–37.4)
MCV RBC AUTO: 91 FL (ref 82–98)
MONOCYTES # BLD AUTO: 0.56 THOUSAND/ÂΜL (ref 0.17–1.22)
MONOCYTES NFR BLD AUTO: 10 % (ref 4–12)
NEUTROPHILS # BLD AUTO: 3.55 THOUSANDS/ÂΜL (ref 1.85–7.62)
NEUTS SEG NFR BLD AUTO: 65 % (ref 43–75)
NRBC BLD AUTO-RTO: 0 /100 WBCS
P AXIS: 35 DEGREES
PLATELET # BLD AUTO: 145 THOUSANDS/UL (ref 149–390)
PLATELET BLD QL SMEAR: ABNORMAL
PMV BLD AUTO: 11.6 FL (ref 8.9–12.7)
POTASSIUM SERPL-SCNC: 3.8 MMOL/L (ref 3.5–5.3)
PR INTERVAL: 120 MS
PR INTERVAL: 156 MS
PROT SERPL-MCNC: 7 G/DL (ref 6.4–8.4)
PROTHROMBIN TIME: 13.8 SECONDS (ref 11.6–14.5)
QRS AXIS: -39 DEGREES
QRS AXIS: -43 DEGREES
QRSD INTERVAL: 108 MS
QRSD INTERVAL: 110 MS
QT INTERVAL: 446 MS
QT INTERVAL: 454 MS
QTC INTERVAL: 430 MS
QTC INTERVAL: 434 MS
RBC # BLD AUTO: 5.16 MILLION/UL (ref 3.88–5.62)
RBC MORPH BLD: NORMAL
SODIUM SERPL-SCNC: 139 MMOL/L (ref 135–147)
T WAVE AXIS: 80 DEGREES
T WAVE AXIS: 85 DEGREES
VENTRICULAR RATE: 55 BPM
VENTRICULAR RATE: 56 BPM
WBC # BLD AUTO: 5.46 THOUSAND/UL (ref 4.31–10.16)

## 2024-04-25 PROCEDURE — 85025 COMPLETE CBC W/AUTO DIFF WBC: CPT | Performed by: PHYSICIAN ASSISTANT

## 2024-04-25 PROCEDURE — 83735 ASSAY OF MAGNESIUM: CPT | Performed by: PHYSICIAN ASSISTANT

## 2024-04-25 PROCEDURE — 99284 EMERGENCY DEPT VISIT MOD MDM: CPT

## 2024-04-25 PROCEDURE — 99213 OFFICE O/P EST LOW 20 MIN: CPT | Performed by: FAMILY MEDICINE

## 2024-04-25 PROCEDURE — 84484 ASSAY OF TROPONIN QUANT: CPT | Performed by: PHYSICIAN ASSISTANT

## 2024-04-25 PROCEDURE — 80053 COMPREHEN METABOLIC PANEL: CPT | Performed by: PHYSICIAN ASSISTANT

## 2024-04-25 PROCEDURE — 93010 ELECTROCARDIOGRAM REPORT: CPT | Performed by: INTERNAL MEDICINE

## 2024-04-25 PROCEDURE — 70450 CT HEAD/BRAIN W/O DYE: CPT

## 2024-04-25 PROCEDURE — 93005 ELECTROCARDIOGRAM TRACING: CPT

## 2024-04-25 PROCEDURE — 96374 THER/PROPH/DIAG INJ IV PUSH: CPT

## 2024-04-25 PROCEDURE — 72125 CT NECK SPINE W/O DYE: CPT

## 2024-04-25 PROCEDURE — 85610 PROTHROMBIN TIME: CPT | Performed by: PHYSICIAN ASSISTANT

## 2024-04-25 PROCEDURE — 85730 THROMBOPLASTIN TIME PARTIAL: CPT | Performed by: PHYSICIAN ASSISTANT

## 2024-04-25 PROCEDURE — 93000 ELECTROCARDIOGRAM COMPLETE: CPT | Performed by: FAMILY MEDICINE

## 2024-04-25 PROCEDURE — 36415 COLL VENOUS BLD VENIPUNCTURE: CPT | Performed by: PHYSICIAN ASSISTANT

## 2024-04-25 PROCEDURE — 99285 EMERGENCY DEPT VISIT HI MDM: CPT | Performed by: PHYSICIAN ASSISTANT

## 2024-04-25 RX ORDER — HYDRALAZINE HYDROCHLORIDE 20 MG/ML
10 INJECTION INTRAMUSCULAR; INTRAVENOUS ONCE
Status: COMPLETED | OUTPATIENT
Start: 2024-04-25 | End: 2024-04-25

## 2024-04-25 RX ORDER — OXYCODONE HYDROCHLORIDE AND ACETAMINOPHEN 5; 325 MG/1; MG/1
1 TABLET ORAL ONCE
Status: COMPLETED | OUTPATIENT
Start: 2024-04-25 | End: 2024-04-25

## 2024-04-25 RX ORDER — OXYCODONE HYDROCHLORIDE AND ACETAMINOPHEN 5; 325 MG/1; MG/1
1 TABLET ORAL EVERY 6 HOURS PRN
Qty: 12 TABLET | Refills: 0 | Status: SHIPPED | OUTPATIENT
Start: 2024-04-25 | End: 2024-05-07

## 2024-04-25 RX ORDER — PREDNISONE 10 MG/1
TABLET ORAL
Qty: 21 TABLET | Refills: 0 | Status: SHIPPED | OUTPATIENT
Start: 2024-04-25

## 2024-04-25 RX ADMIN — OXYCODONE HYDROCHLORIDE AND ACETAMINOPHEN 1 TABLET: 5; 325 TABLET ORAL at 15:26

## 2024-04-25 RX ADMIN — HYDRALAZINE HYDROCHLORIDE 10 MG: 20 INJECTION, SOLUTION INTRAMUSCULAR; INTRAVENOUS at 12:53

## 2024-04-25 RX ADMIN — PREDNISONE 50 MG: 20 TABLET ORAL at 15:25

## 2024-04-25 NOTE — TELEPHONE ENCOUNTER
[facility]  - called to get results for EKG faxed to them [phone] and send to  [phone]    Needed to discharge asap

## 2024-04-25 NOTE — TELEPHONE ENCOUNTER
Patient called with c/o dizziness and feeling like was going to pass out.    Patient states yesterday had BP reading of 191/9.    Warm transfer to Nurse Triage.

## 2024-04-25 NOTE — DISCHARGE INSTRUCTIONS
There is no evidence of atrial fibrillation on the 3 tracings taken in the emergency room today.  Please make a follow-up appointment with your physician tomorrow for recheck exam.    If your symptoms worsen, increased pain, lightheadedness, headaches, return to the emergency room for repeat exam

## 2024-04-25 NOTE — TELEPHONE ENCOUNTER
Er nurse called asking for EKG results done in office today for patient. Please fax to [facility] at [phone]. Thank you

## 2024-04-25 NOTE — TELEPHONE ENCOUNTER
"Patient contacted the office this morning reporting high BP at home yesterday 191/91 and this morning 184/88. Patient denies cardiac and neurological symptoms at this time but reports yesterday he felt dizzy and had to sit down because he thought he was going to pass out. Patient takes BP medication daily, no missed doses reported. Advised of recommendation to be seen in the office today, appointment scheduled with PCP at 10:00. Patient states his wife will drive him to his appointment. Advised patient to call us back at the office if his symptoms were to worsen and to call 911 if he began experiencing cardiac or neurological symptoms before his appointment. Patient verbalized understanding.    Reason for Disposition   Systolic BP >= 180 OR Diastolic >= 110    Answer Assessment - Initial Assessment Questions  1. BLOOD PRESSURE: \"What is the blood pressure?\" \"Did you take at least two measurements 5 minutes apart?\"      191/91 (symptomatic - dizzy/faint) yesterday, 184/88 today (asymptomatic)  2. ONSET: \"When did you take your blood pressure?\"      Yesterday and this morning  3. HOW: \"How did you obtain the blood pressure?\" (e.g., visiting nurse, automatic home BP monitor)      Home BP monitor  4. HISTORY: \"Do you have a history of high blood pressure?\"      Yes  5. MEDICATIONS: \"Are you taking any medications for blood pressure?\" \"Have you missed any doses recently?\"      Yes, amLODIPine-benazepril (LOTREL 5-10) 5-10 MG per capsule. Denies missed doses   6. OTHER SYMPTOMS: \"Do you have any symptoms?\" (e.g., headache, chest pain, blurred vision, difficulty breathing, weakness)      Denies at this time, reports yesterday he felt faint/dizzy and had to sit down on the stairs for fear he would pass out.    Protocols used: Blood Pressure - High-ADULT-[place]    "

## 2024-04-25 NOTE — ED NOTES
Called [facility] for the second time. Requested EKG results      Ti Peñaloza RN  04/25/24 [id number]

## 2024-04-25 NOTE — TELEPHONE ENCOUNTER
Attempted to call Ti to let him know EKG results are scanned into chart. Also sent him a teams message.

## 2024-04-25 NOTE — ED PROVIDER NOTES
History  Chief Complaint   Patient presents with    Hypertension     Pt presents with HTN, states yesterday he got dizzy and lightheaded. And since has got worse, went to PCP this am where they told him that  his EKG was abnormal to come for eval.      This 73-year-old male presents emergency room from his doctor's office.  He went to the doctor because yesterday he felt like he was going to pass out.  He states his blood pressure was high in the 180s.  He complained of a generalized headache.  He states that that has since resolved with the rest.  This morning he called his doctor to make an appointment to be rechecked.  In the office his pressure was in the 190s.  He had a heart rate in the 50s.  He was told that he had an abnormal EKG and he needed to come to the emergency room.  Presently he has no chest pain or shortness of breath.  He denies any headaches, double, blurred vision.  He complains of some recent history of neck pain and has a pinched nerve in his neck that radiates to his right shoulder.  He states he lacks range of motion secondary to pain and guarding.  He states he had it about 6 months ago and then it improved on its own so he never followed through with the physical therapy.  He states recently over the past few weeks that has been bothering him again.  He denies any known injury.  He denies any weight loss.  He denies any abdominal pain, black tarry stools, bright red blood per rectum.  He is presently taking Lotrel 5-10 which is amlodipine-benazepril.  He has been compliant with this medication and has been taking it for approximately 20 years.    Past medical history is positive for cataracts, neck pain, sleep apnea, hypertension, pneumonia.      History provided by:  Patient   used: No    Hypertension  Severity:  Moderate  Onset quality:  Gradual  Duration:  2 days  Timing:  Intermittent  Progression:  Unchanged  Chronicity:  Chronic  Time since last dose of  antihypertensive:  3 hours  Notable PTA blood pressures:  192/85  Context: normal sodium (190), not caffeine, not drug abuse, not herbal remedies, not medication change, not noncompliance, not OTC medications used and not stress    Context comment:  Neck pain  Relieved by: Lotrel.  Worsened by:  Nothing  Ineffective treatments: Lotrel.  Associated symptoms: headaches and neck pain    Associated symptoms: no abdominal pain, no anxiety, no blurred vision, no chest pain, no confusion, no dizziness, no ear pain, no epistaxis, no fatigue, no fever, no hematuria, no hypokalemia, no loss of consciousness, no nausea, no palpitations, no peripheral edema, no shortness of breath, no syncope, no tinnitus, not vomiting and no weakness    Associated symptoms comment:  Near syncope  Risk factors: no alcohol use, no cardiac disease, no cocaine use, no decongestant use, no diabetes, no family history of hypertension, no kidney disease, no obesity, no prior aneurysm, no prior stroke, no PVD and no tobacco use        Prior to Admission Medications   Prescriptions Last Dose Informant Patient Reported? Taking?   Diclofenac Sodium (VOLTAREN) 1 %  Self No No   Sig: Apply 2 g topically 4 (four) times a day   Patient not taking: Reported on 1/19/2024   amLODIPine-benazepril (LOTREL 5-10) 5-10 MG per capsule   No No   Sig: TAKE 1 CAPSULE BY MOUTH EVERY DAY   benzonatate (TESSALON PERLES) 100 mg capsule   No No   Sig: Take 1 capsule (100 mg total) by mouth 3 (three) times a day as needed for cough   Patient not taking: Reported on 4/25/2024   sildenafil (VIAGRA) 100 mg tablet   No No   Sig: Take 1 tablet (100 mg total) by mouth daily as needed for erectile dysfunction for up to 10 days      Facility-Administered Medications: None       Past Medical History:   Diagnosis Date    Cataract, right eye     last assessed 04/21/2015    CPAP (continuous positive airway pressure) dependence     no currently use    Hypertension     Pneumonia     Sleep  apnea        Past Surgical History:   Procedure Laterality Date    CATARACT EXTRACTION, BILATERAL      EXAMINATION UNDER ANESTHESIA N/A 8/25/2022    Procedure: EXAM UNDER ANESTHESIA (EUA) Drug Induced Sleep Endoscopy;  Surgeon: Tni Rodriguez MD;  Location: [facility];  Service: ENT    MYRINGOTOMY W/ TUBES      DC REPAIR NASAL VESTIBULAR STENOSIS Bilateral 8/25/2022    Procedure: REPAIR VESTIBULAR STENOSIS;  Surgeon: Tin Rodriguez MD;  Location: [facility];  Service: ENT    DC SEPTOPLASTY/SUBMUCOUS RESECJ W/WO CARTILAGE GRF N/A 8/25/2022    Procedure: SEPTOPLASTY;  Surgeon: Tin Rodriguez MD;  Location: [facility];  Service: ENT    DC SUBMUCOUS RESCJ INFERIOR TURBINATE PRTL/COMPL Bilateral 8/25/2022    Procedure: TURBINECTOMY;  Surgeon: Tin Rodriguez MD;  Location: [facility];  Service: ENT       Family History   Problem Relation Age of Onset    Hypertension Father     No Known Problems Mother      I have reviewed and agree with the history as documented.    E-Cigarette/Vaping    E-Cigarette Use Never User      E-Cigarette/Vaping Substances     Social History     Tobacco Use    Smoking status: Never    Smokeless tobacco: Never   Vaping Use    Vaping status: Never Used   Substance Use Topics    Alcohol use: Yes     Alcohol/week: 2.0 standard drinks of alcohol     Types: 2 Shots of liquor per week    Drug use: Never       Review of Systems   Constitutional:  Positive for activity change. Negative for appetite change, chills, diaphoresis, fatigue and fever.   HENT:  Negative for congestion, ear discharge, ear pain, mouth sores, nosebleeds, postnasal drip, rhinorrhea, tinnitus and trouble swallowing.    Eyes:  Negative for blurred vision, pain, discharge, redness and itching.   Respiratory:  Negative for cough, chest tightness and shortness of breath.    Cardiovascular:  Negative for chest pain, palpitations and syncope.   Gastrointestinal:  Negative for abdominal pain, nausea and vomiting.    Genitourinary:  Negative for dysuria, hematuria and urgency.   Musculoskeletal:  Positive for neck pain and neck stiffness. Negative for arthralgias, back pain and gait problem.   Skin:  Negative for color change, pallor and rash.   Neurological:  Positive for headaches. Negative for dizziness, loss of consciousness and weakness.   Psychiatric/Behavioral:  Negative for confusion. The patient is not nervous/anxious.    All other systems reviewed and are negative.      Physical Exam  Physical Exam  Vitals and nursing note reviewed.   Constitutional:       General: He is not in acute distress.     Appearance: Normal appearance. He is normal weight. He is not ill-appearing, toxic-appearing or diaphoretic.   HENT:      Head: Normocephalic and atraumatic.      Right Ear: External ear normal.      Left Ear: External ear normal.      Nose: No congestion or rhinorrhea.      Mouth/Throat:      Pharynx: No oropharyngeal exudate or posterior oropharyngeal erythema.   Eyes:      General:         Right eye: No discharge.         Left eye: No discharge.      Conjunctiva/sclera: Conjunctivae normal.   Cardiovascular:      Rate and Rhythm: Normal rate and regular rhythm.      Heart sounds: Normal heart sounds.   Pulmonary:      Effort: Pulmonary effort is normal.      Breath sounds: Normal breath sounds.   Abdominal:      General: There is no distension.      Tenderness: There is no abdominal tenderness. There is no guarding or rebound.   Musculoskeletal:      Cervical back: Neck supple. No rigidity or tenderness.      Right lower leg: No edema.      Left lower leg: No edema.   Lymphadenopathy:      Cervical: No cervical adenopathy.   Skin:     General: Skin is warm.      Capillary Refill: Capillary refill takes less than 2 seconds.      Findings: No rash.   Neurological:      Mental Status: He is alert and oriented to person, place, and time.      Gait: Gait normal.   Psychiatric:         Mood and Affect: Mood normal.          Behavior: Behavior normal.         Thought Content: Thought content normal.         Judgment: Judgment normal.         Vital Signs  ED Triage Vitals   Temperature Pulse Respirations Blood Pressure SpO2   04/25/24 1146 04/25/24 1146 04/25/24 1146 04/25/24 1147 04/25/24 1146   97.5 °F (36.4 °C) (!) 52 18 (!) 209/91 97 %      Temp Source Heart Rate Source Patient Position - Orthostatic VS BP Location FiO2 (%)   04/25/24 1146 04/25/24 1146 04/25/24 1146 04/25/24 1146 --   Oral Monitor Sitting Right arm       Pain Score       04/25/24 1146       No Pain           Vitals:    04/25/24 1303 04/25/24 1401 04/25/24 1414 04/25/24 1440   BP: 166/79 (!) 187/85 (!) 185/88 (!) 179/94   Pulse: (!) 50 (!) 54  56   Patient Position - Orthostatic VS: Sitting Sitting Sitting Lying         Visual Acuity  Visual Acuity      Flowsheet Row Most Recent Value   L Pupil Size (mm) 3   R Pupil Size (mm) 3            ED Medications  Medications   hydrALAZINE (APRESOLINE) injection 10 mg (10 mg Intravenous Given 4/25/24 1253)   predniSONE tablet 50 mg (50 mg Oral Given 4/25/24 1525)   oxyCODONE-acetaminophen (PERCOCET) 5-325 mg per tablet 1 tablet (1 tablet Oral Given 4/25/24 1526)       Diagnostic Studies  Results Reviewed       Procedure Component Value Units Date/Time    HS Troponin I 2hr [[id number]]  (Normal) Collected: 04/25/24 1301    Lab Status: Final result Specimen: Blood from Arm, Right Updated: 04/25/24 1332     hs TnI 2hr 6 ng/L      Delta 2hr hsTnI -1 ng/L     Comprehensive metabolic panel [[id number]] Collected: 04/25/24 1159    Lab Status: Final result Specimen: Blood from Arm, Right Updated: 04/25/24 1250     Sodium 139 mmol/L      Potassium 3.8 mmol/L      Chloride 108 mmol/L      CO2 25 mmol/L      ANION GAP 6 mmol/L      BUN 12 mg/dL      Creatinine 0.94 mg/dL      Glucose 118 mg/dL      Calcium 9.1 mg/dL      AST 17 U/L      ALT 16 U/L      Alkaline Phosphatase 58 U/L      Total Protein 7.0 g/dL      Albumin 3.9 g/dL      Total  Bilirubin 0.64 mg/dL      eGFR 78 ml/min/1.73sq m     Narrative:      [employer] guidelines for Chronic Kidney Disease (CKD):     Stage 1 with normal or high GFR (GFR > 90 mL/min/1.73 square meters)    Stage 2 Mild CKD (GFR = 60-89 mL/min/1.73 square meters)    Stage 3A Moderate CKD (GFR = 45-59 mL/min/1.73 square meters)    Stage 3B Moderate CKD (GFR = 30-44 mL/min/1.73 square meters)    Stage 4 Severe CKD (GFR = 15-29 mL/min/1.73 square meters)    Stage 5 End Stage CKD (GFR <15 mL/min/1.73 square meters)  Note: GFR calculation is accurate only with a steady state creatinine    Magnesium [[id number]]  (Normal) Collected: 04/25/24 1159    Lab Status: Final result Specimen: Blood from Arm, Right Updated: 04/25/24 1250     Magnesium 2.0 mg/dL     CBC and differential [[id number]]  (Abnormal) Collected: 04/25/24 1159    Lab Status: Final result Specimen: Blood from Arm, Right Updated: 04/25/24 1242     WBC 5.46 Thousand/uL      RBC 5.16 Million/uL      Hemoglobin 16.1 g/dL      Hematocrit 47.0 %      MCV 91 fL      MCH 31.2 pg      MCHC 34.3 g/dL      RDW 12.9 %      MPV 11.6 fL      Platelets 145 Thousands/uL      nRBC 0 /100 WBCs      Segmented % 65 %      Immature Grans % 0 %      Lymphocytes % 21 %      Monocytes % 10 %      Eosinophils Relative 3 %      Basophils Relative 1 %      Absolute Neutrophils 3.55 Thousands/µL      Absolute Immature Grans 0.01 Thousand/uL      Absolute Lymphocytes 1.13 Thousands/µL      Absolute Monocytes 0.56 Thousand/µL      Eosinophils Absolute 0.15 Thousand/µL      Basophils Absolute 0.06 Thousands/µL     Narrative:      This is an appended report.  These results have been appended to a previously verified report.    Smear Review(Phlebs Do Not Order) [[id number]]  (Abnormal) Collected: 04/25/24 1159    Lab Status: Final result Specimen: Blood from Arm, Right Updated: 04/25/24 1242     RBC Morphology Normal     Platelet Estimate Borderline    HS Troponin 0hr  (reflex protocol) [[id number]]  (Normal) Collected: 04/25/24 1159    Lab Status: Final result Specimen: Blood from Arm, Right Updated: 04/25/24 1235     hs TnI 0hr 7 ng/L     Protime-INR [[id number]]  (Normal) Collected: 04/25/24 1159    Lab Status: Final result Specimen: Blood from Arm, Right Updated: 04/25/24 1228     Protime 13.8 seconds      INR 1.00    APTT [[id number]]  (Normal) Collected: 04/25/24 1159    Lab Status: Final result Specimen: Blood from Arm, Right Updated: 04/25/24 1228     PTT 29 seconds                    CT head wo contrast   ED Interpretation by Julie Lynn Gutzweiler, PA-C (04/25 7814)   CT head wo contrast  Status: Final result    PACS Images     Show images for CT head wo contrast  Study Result    Narrative & Impression  CT BRAIN - WITHOUT CONTRAST     INDICATION:   Hypertensive urgency, headache, near syncope.     COMPARISON:  None.     TECHNIQUE:  CT examination of the brain was performed.  Multiplanar 2D reformatted images were created from the source data.     Radiation dose length product (DLP) for this visit:  1021.76 mGy-cm .  This examination, like all CT scans performed in the [facility], was performed utilizing techniques to minimize radiation dose exposure, including the use of   iterative reconstruction and automated exposure control.     IMAGE QUALITY:  Diagnostic.     FINDINGS:     PARENCHYMA: Decreased attenuation is noted in periventricular and subcortical white matter demonstrating an appearance that is statistically most likely to represent moderate microangiopathic change.     No CT signs of acute infarction.  No intracranial mass   , mass effect or midline shift.  No acute parenchymal hemorrhage.     VENTRICLES AND EXTRA-AXIAL SPACES:  Normal for the patient's age.     VISUALIZED ORBITS: Normal visualized orbits.     PARANASAL SINUSES: Normal visualized paranasal sinuses.     CALVARIUM AND EXTRACRANIAL SOFT TISSUES:  Normal.     IMPRESSION:     No acute  intracranial abnormality.  Chronic microangiopathic changes.                  Final Result by Nathan Eaton MD (04/25 7141)      No acute intracranial abnormality.  Chronic microangiopathic changes.                  Workstation performed: [id number]         CT spine cervical without contrast   ED Interpretation by Julie Lynn Gutzweiler, PA-C (04/25 9699)   CT spine cervical without contrast  Status: Final result    PACS Images     Show images for CT spine cervical without contrast  Study Result    Narrative & Impression  CT CERVICAL SPINE - WITHOUT CONTRAST     INDICATION:   right neck pain radiating to right shoulder C 5-6, no injury.     COMPARISON:  None.     TECHNIQUE:  CT examination of the cervical spine was performed without intravenous contrast.  Contiguous axial images were obtained. Multiplanar 2D reformatted images were created from the source data.     Radiation dose length product (DLP) for this visit:  350.8 mGy-cm .  This examination, like all CT scans performed in the [facility], was performed utilizing techniques to minimize radiation dose exposure, including the use of iterative   reconstruction and automated exposure control.     IMAGE QUALITY:  Diagnostic.     FINDINGS:     ALIGNMENT:  Normal alignment of the cervical spine. No subluxation.     VERTEBRAE:  No fracture.     DEGENERATIVE CHANGES:  Jaquelin   re multilevel cervical degenerative changes are noted.  No critical central canal stenosis.     PREVERTEBRAL AND PARASPINAL SOFT TISSUES: Unremarkable     THORACIC INLET:  Normal.     IMPRESSION:     No cervical spine fracture or traumatic malalignment.            Final Result by Nathan Eaton MD (04/25 5503)      No cervical spine fracture or traumatic malalignment.                  Workstation performed: [id number]                    Procedures  ECG 12 Lead Documentation Only    Date/Time: 4/25/2024 2:09 PM    Performed by: Julie Lynn Gutzweiler, PA-C  Authorized by: Gabriela Stapleton  Gutzweiler, PA-C    Indications / Diagnosis:  Repeat 2 hours  ECG reviewed by me, the ED Provider: yes    Patient location:  ED  Previous ECG:     Previous ECG:  Compared to current    Similarity:  No change    Comparison to cardiac monitor: No    Interpretation:     Interpretation: abnormal    Rate:     ECG rate assessment: bradycardic    Rhythm:     Rhythm: sinus rhythm    Ectopy:     Ectopy: none    QRS:     QRS axis:  Normal    QRS intervals:  Wide  Conduction:     Conduction: abnormal      Abnormal conduction: complete RBBB    ST segments:     ST segments:  Normal  T waves:     T waves: normal    Comments:      No signs of acute ischemia    No evidence of atrial fibrillation.    Independently interpreted by me           ED Course  ED Course as of 04/25/24 2006   Thu Apr 25, 2024   1407 Patient is EKG from the office taken prior to arrival shows sinus bradycardia with the artifact.  The reading is stating that it is atrial fibrillation but I do not feel that this is a true tracing.  Patient has not been a true fibrillation during his review this tracing with Dr. Galdamez who agrees with me that this is most likely artifactual and not true atrial fibrillation.             HEART Risk Score      Flowsheet Row Most Recent Value   Heart Score Risk Calculator    History 0 Filed at: 04/25/2024 1425   ECG 1 Filed at: 04/25/2024 1425   Age 2 Filed at: 04/25/2024 1425   Risk Factors 1 Filed at: 04/25/2024 1425   Troponin 0 Filed at: 04/25/2024 1425   HEART Score 4 Filed at: 04/25/2024 1425                                        Medical Decision Making  Patient presents emergency room with complaints of high blood pressure and neck pain that radiates to his right shoulder.  He was seen by the family physician he thought he had atrial fibrillation on an EKG.  He was seen and evaluated.  He had a right bundle branch block on the EKG that was old.  Laboratory studies were taken and were reviewed.  A CAT scan of his brain was  negative for endorgan function.  There were microangiopathic changes.  His CAT scan of the cervical spine demonstrated degenerative disc disease but no acute abnormality.  Patient's EKG from the doctor's office was reviewed.  It did not appear to be atrial fibrillation just a junctional sinus bradycardia.  I did review this tracing with Dr. Galdamez in the emergency room.  We both agreed that this was artifactual in nature and not true atrial fibrillation.  The patient was monitored for approximately 4 hours in the emergency room and remained in his sinus bradycardia rate.  He h as known bradycardia compared to old tracings.  He was medicated for his hypertension with a dose of hydralazine.  I feel that his blood pressure was elevated secondary to his pain in his cervical spine.  He was medicated with prednisone and Percocet prior to discharge.  He was discharged home with Percocet and a 10-day course of tapering prednisone.  He was discharged home and was instructed to see his family physician tomorrow for recheck exam.  He understands and was given reasons to return to the emergency room should his symptoms worsen.    Amount and/or Complexity of Data Reviewed  Labs: ordered.     Details: Independently interpreted by me  Radiology: ordered.     Details: Chest x-ray was independently interpreted by me, no acute cardiopulmonary disease  ECG/medicine tests: ordered and independent interpretation performed.     Details: Sinus bradycardia, no atrial fibrillation, no ischemia, right bundle branch block and sinus bradycardia are old.    Risk  Prescription drug management.             Disposition  Final diagnoses:   Uncontrolled hypertension   Cervical radiculopathy at C5   Bradycardia, sinus - History of known bradycardia   RBBB - known RBBB   Near syncope   Neck pain, musculoskeletal     Time reflects when diagnosis was documented in both MDM as applicable and the Disposition within this note       Time User Action Codes  Description Comment    4/25/2024  1:43 PM Gutzweiler, Julie Add [I10] Uncontrolled hypertension     4/25/2024  1:44 PM Gutzweiler, Julie Add [M54.12] Cervical radiculopathy at C5     4/25/2024  1:45 PM Gutzweiler, Julie Add [R00.1] Bradycardia, sinus     4/25/2024  1:45 PM Gutzweiler, Julie Modify [R00.1] Bradycardia, sinus History of known bradycardia    4/25/2024  1:45 PM Gutzweiler, Julie Add [I45.10] RBBB     4/25/2024  1:46 PM Gutzweiler, Julie Modify [I45.10] RBBB known RBBB    4/25/2024  2:26 PM Gutzweiler, Julie Add [R55] Near syncope     4/25/2024  3:08 PM Gutzweiler, Julie Add [M54.2] Neck pain, musculoskeletal           ED Disposition       ED Disposition   Discharge    Condition   Stable    Date/Time   Thu Apr 25, 2024 1508    Comment   Amando Rivas discharge to home/self care.                   Follow-up Information       Follow up With Specialties Details Why Contact Info Additional Information    Efra Aguilera MD Family Medicine Schedule an appointment as soon as possible for a visit in 1 day  [address] [place] [address]  [phone]       [facility] Emergency Medicine  As needed, If symptoms worsen [address] [place] [id number]  [phone] [facility], [address] [place] [address], [phone]            Discharge Medication List as of 4/25/2024  3:15 PM        START taking these medications    Details   oxyCODONE-acetaminophen (PERCOCET) 5-325 mg per tablet Take 1 tablet by mouth every 6 (six) hours as needed for moderate pain for up to 12 days Max Daily Amount: 4 tablets, Starting Thu 4/25/2024, Until Tue 5/7/2024 at 2359, Normal      predniSONE 10 mg tablet Take 2 tabs twice daily for 3 days, then one tab twice daily for 3 days then one tab daily x 3 days.  Start tomorrow, first dose was given to you in the Emergency room, Print           CONTINUE these medications  which have NOT CHANGED    Details   amLODIPine-benazepril (LOTREL 5-10) 5-10 MG per capsule TAKE 1 CAPSULE BY MOUTH EVERY DAY, Starting Thu 9/14/2023, Normal      benzonatate (TESSALON PERLES) 100 mg capsule Take 1 capsule (100 mg total) by mouth 3 (three) times a day as needed for cough, Starting Fri 1/5/2024, Normal      Diclofenac Sodium (VOLTAREN) 1 % Apply 2 g topically 4 (four) times a day, Starting Wed 5/10/2023, Normal      sildenafil (VIAGRA) 100 mg tablet Take 1 tablet (100 mg total) by mouth daily as needed for erectile dysfunction for up to 10 days, Starting Mon 12/4/2023, Until Thu 4/25/2024 at 2359, Normal                 PDMP Review         Value Time User    PDMP Reviewed  Yes 8/25/2022  8:47 AM Tin Rodriguez MD            ED Provider  Electronically Signed by             Julie Lynn Gutzweiler, PA-C  04/25/24 2006

## 2024-04-25 NOTE — PROGRESS NOTES
Name: Amando Rivas      : 1950      MRN: [id number]  Encounter Provider: Efra Aguilera MD  Encounter Date: 2024   Encounter department: [facility]    Assessment & Plan     1. Uncontrolled hypertension  -     POCT ECG  -     CBC and differential; Future  -     Comprehensive metabolic panel; Future  -     TSH, 3rd generation with Free T4 reflex; Future  -     Lipid panel; Future  -     UA w Reflex to Microscopic w Reflex to Culture; Future  -     Transfer to other facility    2. Dizziness  -     POCT ECG  -     CBC and differential; Future  -     Comprehensive metabolic panel; Future  -     TSH, 3rd generation with Free T4 reflex; Future  -     Lipid panel; Future  -     UA w Reflex to Microscopic w Reflex to Culture; Future  -     Transfer to other facility    3. Hyperlipidemia, unspecified hyperlipidemia type  -     CBC and differential; Future  -     Comprehensive metabolic panel; Future  -     TSH, 3rd generation with Free T4 reflex; Future  -     Lipid panel; Future  -     UA w Reflex to Microscopic w Reflex to Culture; Future  -     Transfer to other facility    4. Atrial fibrillation, new onset (HCC)  -     Transfer to other facility    BP is running high today despite him taking his BP medication this AM.  He feels dizzy and not himself and EKG findings noted new onset Afib with slow ventricular rate.  He denies any palpitations or imbalance either.  Referred him to ER for immediate eval and treatment.    Called ER at [facility] and discussed patient with the on charge nurse and attending.         Subjective      Patient experienced dizziness when going down stairs and took a break and sat down.  The BP was checked and it was high despite him taking BP medication daily.  He was running systolic in the 170-180 range and diastolic BP in 80's.  He sat and took rest for 30 mins and felt better.  Today he states he feels not  himself and feels slightly lightheaded.  Not  "able to fully explain his condition today.  Denies any CP or SOB or RAYMUNDO or diaphoresis.  Speaks full sentences with no breathing trouble.  Mentation is intact.    He did take his BP medication today and had a normal day otherwise.    No falls or LOC noted.          Review of Systems   Constitutional:  Negative for chills, diaphoresis, fatigue, fever and unexpected weight change.   HENT:  Negative for congestion.    Eyes:  Negative for visual disturbance.   Respiratory:  Negative for cough, shortness of breath and wheezing.    Cardiovascular:  Negative for palpitations and leg swelling.   Gastrointestinal:  Negative for abdominal pain, nausea and vomiting.   Skin:  Positive for pallor. Negative for rash.   Neurological:  Positive for dizziness and light-headedness. Negative for tremors, facial asymmetry, weakness and headaches.       Current Outpatient Medications on File Prior to Visit   Medication Sig    amLODIPine-benazepril (LOTREL 5-10) 5-10 MG per capsule TAKE 1 CAPSULE BY MOUTH EVERY DAY    sildenafil (VIAGRA) 100 mg tablet Take 1 tablet (100 mg total) by mouth daily as needed for erectile dysfunction for up to 10 days    benzonatate (TESSALON PERLES) 100 mg capsule Take 1 capsule (100 mg total) by mouth 3 (three) times a day as needed for cough (Patient not taking: Reported on 4/25/2024)    Diclofenac Sodium (VOLTAREN) 1 % Apply 2 g topically 4 (four) times a day (Patient not taking: Reported on 1/19/2024)       Objective     BP (!) 172/90   Pulse 60   Temp 98 °F (36.7 °C)   Resp 18   Ht 5' 8\" (1.727 m)   Wt 91.7 kg (202 lb 3.2 oz)   SpO2 97%   BMI 30.74 kg/m²     Physical Exam  Vitals and nursing note reviewed.   Constitutional:       General: He is not in acute distress.     Appearance: He is well-developed and normal weight. He is not diaphoretic.   HENT:      Head: Normocephalic and atraumatic.   Eyes:      General: No scleral icterus.     Conjunctiva/sclera: Conjunctivae normal.   Cardiovascular: "      Rate and Rhythm: Normal rate.      Heart sounds: Normal heart sounds. No murmur heard.     Comments: Irregular heart beat noted on exam.  Pulmonary:      Effort: Pulmonary effort is normal. No respiratory distress.      Breath sounds: Normal breath sounds. No wheezing.   Abdominal:      General: Bowel sounds are normal.      Palpations: Abdomen is soft.      Tenderness: There is no abdominal tenderness.   Musculoskeletal:         General: Normal range of motion.      Cervical back: Normal range of motion and neck supple. No rigidity.      Right lower leg: No edema.      Left lower leg: No edema.   Skin:     Coloration: Skin is not pale.      Findings: No rash.   Neurological:      General: No focal deficit present.      Mental Status: He is alert and oriented to person, place, and time.   Psychiatric:         Thought Content: Thought content normal.       Efra Aguilera MD

## 2024-04-26 ENCOUNTER — OFFICE VISIT (OUTPATIENT)
Age: 74
End: 2024-04-26
Payer: COMMERCIAL

## 2024-04-26 VITALS
BODY MASS INDEX: 30.89 KG/M2 | DIASTOLIC BLOOD PRESSURE: 72 MMHG | HEART RATE: 65 BPM | WEIGHT: 203.8 LBS | OXYGEN SATURATION: 97 % | SYSTOLIC BLOOD PRESSURE: 152 MMHG | HEIGHT: 68 IN | RESPIRATION RATE: 18 BRPM | TEMPERATURE: 97.8 F

## 2024-04-26 DIAGNOSIS — I10 UNCONTROLLED HYPERTENSION: Primary | ICD-10-CM

## 2024-04-26 DIAGNOSIS — E78.5 HYPERLIPIDEMIA, UNSPECIFIED HYPERLIPIDEMIA TYPE: ICD-10-CM

## 2024-04-26 DIAGNOSIS — R26.0 ATAXIC GAIT: ICD-10-CM

## 2024-04-26 DIAGNOSIS — R42 DIZZINESS: ICD-10-CM

## 2024-04-26 DIAGNOSIS — I48.0 PAROXYSMAL ATRIAL FIBRILLATION (HCC): ICD-10-CM

## 2024-04-26 PROCEDURE — 99213 OFFICE O/P EST LOW 20 MIN: CPT

## 2024-04-26 PROCEDURE — G2211 COMPLEX E/M VISIT ADD ON: HCPCS

## 2024-04-26 RX ORDER — HYDRALAZINE HYDROCHLORIDE 10 MG/1
10 TABLET, FILM COATED ORAL EVERY 12 HOURS PRN
Qty: 90 TABLET | Refills: 0 | Status: SHIPPED | OUTPATIENT
Start: 2024-04-26

## 2024-04-26 NOTE — PROGRESS NOTES
Name: Amando Rivas      : 1950      MRN: [id number]  Encounter Provider: Ariana Garza MD  Encounter Date: 2024   Encounter department: [facility]    Assessment & Plan     1. Uncontrolled hypertension  -     hydrALAZINE (APRESOLINE) 10 mg tablet; Take 1 tablet (10 mg total) by mouth every 12 (twelve) hours as needed (for systolic blood pressure (top number) of 140 or greater)  -     VAS carotid complete study; Future; Expected date: 2024  -     Echo complete w/ contrast if indicated; Future; Expected date: 2024  -     Ambulatory referral to Cardiology; Future  -     Holter monitor; Future; Expected date: 2024    2. Dizziness  -     VAS carotid complete study; Future; Expected date: 2024  -     Echo complete w/ contrast if indicated; Future; Expected date: 2024  -     Ambulatory referral to Cardiology; Future  -     Holter monitor; Future; Expected date: 2024    3. Hyperlipidemia, unspecified hyperlipidemia type  -     VAS carotid complete study; Future; Expected date: 2024  -     Echo complete w/ contrast if indicated; Future; Expected date: 2024  -     Ambulatory referral to Cardiology; Future    4. Paroxysmal atrial fibrillation (HCC)  -     Holter monitor; Future; Expected date: 2024    5. Ataxic gait  -     VAS carotid complete study; Future; Expected date: 2024       Mr. Alan Rivas is a 72 yo male with a hx of HTN presenting for follow up after he was sent to the ED for hypertensive urgency and concern for acute atrial fibrillation. Patient had no repeat episodes of atrial fibrillation while in the ED. Laboratory workup was unremarkable, CT head noncon was WNL, and CT c spine demonstrated severe osteoporosis. Today, patient continues to have elevated BP along with intermittent lightheadedness/dizziness when he rises to stand or turns his head too quickly. Physical exam demonstrates regular rate and  "rhythm without murmur, rub, or gallops.     Plan:   - Echocardiogram to assess cardiac function   - Carotid artery duplex scan to assess plaque burden   - Holter monitor for paroxysmal episode of atrial fibrillation   - Outpatient referral to cardiology for refractory HTN, lightheadedness/dizziness  - Hydralazine 10mg q12h PRN for SBP greater than or equal to 140   - Ambulatory blood pressure log     Return in approximately 2 weeks for f/u BP.     Subjective      HPI    Patient presents today for ED follow up. He was recently seen in office yesterday, 4/25/24 for abnormally elevated BP and dizziness while ambulating down stairs. In office EKG was suggestive of new onset atrial fibrillation, so the patient was sent to the ED for further evaluation.     Workup in the ED included 4 hours of EKG monitoring, CT head noncon, and CT c spine noncon. ED labwork included CBC, CMP, Mag, PT-INR, and troponins. ED workup was largely unremarkable. Patient's BP improved with hydralazine, and there was no evidence of CVA, cardiac ischemia, or cardiac arrhythmia. CT c spine demonstrated \"severe multilevel cervical degenerative changes... No critical central canal stenosis.\" He was prescribed percocet PRN for his neck pain on d/c from ED.     Today, patient explains he has known pinched nerve in his neck. He was referred for physical therapy but never went. He endorses increased neck pain for the last week, which the ED explained may be causing an acute elevated in his BP. Pain is dull and located in the back, R side of his neck. It is usually 2/10, but has recently been 6/10. He endorses decreased ROM to the R, and some lightheadedness/dizziness earlier today when he grisel to stand from sitting.     Patient endorses his neck pain has been responding to treatment with percocet. However, he finds the pain is mostly tolerable and he does not feel the need to take the medication. He has been keeping a thorough home BP log, and notes his " "BP changed from 165/91 before taking the percocet to 154/83 after taking the percocet. He denies any chest pain, SOB, sweats, weight changes, or vision changes.       Review of Systems   Constitutional:  Negative for chills and fever.   HENT:  Negative for congestion and rhinorrhea.    Eyes:  Negative for discharge and redness.   Respiratory:  Negative for shortness of breath.    Cardiovascular:  Negative for chest pain, palpitations and leg swelling.   Gastrointestinal:  Negative for abdominal pain, constipation, diarrhea, nausea and vomiting.   Endocrine: Negative for cold intolerance and heat intolerance.   Genitourinary:  Negative for dysuria and hematuria.   Musculoskeletal:  Positive for neck pain. Negative for arthralgias and myalgias.   Skin:  Negative for color change and rash.   Allergic/Immunologic: Negative for environmental allergies and food allergies.   Neurological:  Positive for dizziness and light-headedness.   Hematological:  Negative for adenopathy.   Psychiatric/Behavioral:  The patient is not nervous/anxious.        Current Outpatient Medications on File Prior to Visit   Medication Sig    amLODIPine-benazepril (LOTREL 5-10) 5-10 MG per capsule TAKE 1 CAPSULE BY MOUTH EVERY DAY    oxyCODONE-acetaminophen (PERCOCET) 5-325 mg per tablet Take 1 tablet by mouth every 6 (six) hours as needed for moderate pain for up to 12 days Max Daily Amount: 4 tablets    predniSONE 10 mg tablet Take 2 tabs twice daily for 3 days, then one tab twice daily for 3 days then one tab daily x 3 days.  Start tomorrow, first dose was given to you in the Emergency room    sildenafil (VIAGRA) 100 mg tablet Take 1 tablet (100 mg total) by mouth daily as needed for erectile dysfunction for up to 10 days       Objective     /72   Pulse 65   Temp 97.8 °F (36.6 °C)   Resp 18   Ht 5' 8\" (1.727 m)   Wt 92.4 kg (203 lb 12.8 oz)   SpO2 97%   BMI 30.99 kg/m²     Physical Exam  Vitals and nursing note reviewed. "   Constitutional:       General: He is not in acute distress.     Appearance: He is not ill-appearing, toxic-appearing or diaphoretic.   HENT:      Head: Normocephalic and atraumatic.      Right Ear: External ear normal.      Left Ear: External ear normal.      Nose: Nose normal. No congestion or rhinorrhea.      Mouth/Throat:      Mouth: Mucous membranes are moist.   Eyes:      General: No scleral icterus.        Right eye: No discharge.         Left eye: No discharge.      Conjunctiva/sclera: Conjunctivae normal.   Neck:      Comments: ROM decreased on rotation to the R   Cardiovascular:      Rate and Rhythm: Normal rate and regular rhythm.      Heart sounds: No murmur heard.     No friction rub. No gallop.   Pulmonary:      Effort: Pulmonary effort is normal. No respiratory distress.      Breath sounds: Normal breath sounds. No stridor. No wheezing, rhonchi or rales.   Chest:      Chest wall: No tenderness.   Abdominal:      General: There is no distension.      Tenderness: There is no abdominal tenderness. There is no guarding.   Musculoskeletal:         General: No swelling or deformity.      Cervical back: Neck supple. No tenderness.      Right lower leg: No edema.      Left lower leg: No edema.   Lymphadenopathy:      Cervical: No cervical adenopathy.   Skin:     General: Skin is warm and dry.      Capillary Refill: Capillary refill takes less than 2 seconds.      Coloration: Skin is not jaundiced.      Findings: No bruising or lesion.   Neurological:      Mental Status: He is alert.      Motor: No weakness.      Gait: Gait abnormal.   Psychiatric:         Mood and Affect: Mood normal.         Behavior: Behavior normal.         Thought Content: Thought content normal.         Judgment: Judgment normal.         Ariana Garza MD   PGY-2

## 2024-04-30 ENCOUNTER — HOSPITAL ENCOUNTER (OUTPATIENT)
Dept: NON INVASIVE DIAGNOSTICS | Facility: CLINIC | Age: 74
Discharge: HOME | End: 2024-04-30
Payer: COMMERCIAL

## 2024-04-30 DIAGNOSIS — R26.0 ATAXIC GAIT: ICD-10-CM

## 2024-04-30 DIAGNOSIS — I10 UNCONTROLLED HYPERTENSION: ICD-10-CM

## 2024-04-30 DIAGNOSIS — R42 DIZZINESS: ICD-10-CM

## 2024-04-30 DIAGNOSIS — E78.5 HYPERLIPIDEMIA, UNSPECIFIED HYPERLIPIDEMIA TYPE: ICD-10-CM

## 2024-04-30 PROCEDURE — 93880 EXTRACRANIAL BILAT STUDY: CPT | Performed by: INTERNAL MEDICINE

## 2024-04-30 PROCEDURE — 93880 EXTRACRANIAL BILAT STUDY: CPT

## 2024-05-09 ENCOUNTER — HOSPITAL ENCOUNTER (OUTPATIENT)
Dept: NON INVASIVE DIAGNOSTICS | Facility: CLINIC | Age: 74
Discharge: HOME | End: 2024-05-09
Payer: COMMERCIAL

## 2024-05-09 ENCOUNTER — RA CDI HCC (OUTPATIENT)
Dept: OTHER | Facility: HOSPITAL | Age: 74
End: 2024-05-09

## 2024-05-09 VITALS
HEART RATE: 65 BPM | DIASTOLIC BLOOD PRESSURE: 72 MMHG | WEIGHT: 203.71 LBS | SYSTOLIC BLOOD PRESSURE: 152 MMHG | BODY MASS INDEX: 30.87 KG/M2 | HEIGHT: 68 IN

## 2024-05-09 DIAGNOSIS — I48.0 PAROXYSMAL ATRIAL FIBRILLATION (HCC): ICD-10-CM

## 2024-05-09 DIAGNOSIS — I10 UNCONTROLLED HYPERTENSION: ICD-10-CM

## 2024-05-09 DIAGNOSIS — E78.5 HYPERLIPIDEMIA, UNSPECIFIED HYPERLIPIDEMIA TYPE: ICD-10-CM

## 2024-05-09 DIAGNOSIS — R42 DIZZINESS: ICD-10-CM

## 2024-05-09 LAB
AORTIC ROOT: 4 CM
APICAL FOUR CHAMBER EJECTION FRACTION: 65 %
ASCENDING AORTA: 3.9 CM
BSA FOR ECHO PROCEDURE: 2.06 M2
E WAVE DECELERATION TIME: 186 MS
E/A RATIO: 0.77
FRACTIONAL SHORTENING: 30 (ref 28–44)
INTERVENTRICULAR SEPTUM IN DIASTOLE (PARASTERNAL SHORT AXIS VIEW): 1.3 CM
INTERVENTRICULAR SEPTUM: 1.3 CM (ref 0.6–1.1)
LAAS-AP2: 18.4 CM2
LAAS-AP4: 23.5 CM2
LEFT ATRIUM SIZE: 3.9 CM
LEFT ATRIUM VOLUME (MOD BIPLANE): 62 ML
LEFT ATRIUM VOLUME INDEX (MOD BIPLANE): 30.1 ML/M2
LEFT INTERNAL DIMENSION IN SYSTOLE: 3.8 CM (ref 2.1–4)
LEFT VENTRICULAR INTERNAL DIMENSION IN DIASTOLE: 5.4 CM (ref 3.5–6)
LEFT VENTRICULAR POSTERIOR WALL IN END DIASTOLE: 1.3 CM
LEFT VENTRICULAR STROKE VOLUME: 78 ML
LVSV (TEICH): 78 ML
MV E'TISSUE VEL-LAT: 7 CM/S
MV E'TISSUE VEL-SEP: 7 CM/S
MV PEAK A VEL: 0.87 M/S
MV PEAK E VEL: 67 CM/S
MV STENOSIS PRESSURE HALF TIME: 54 MS
MV VALVE AREA P 1/2 METHOD: 4.1
RA PRESSURE ESTIMATED: 3 MMHG
RIGHT ATRIUM AREA SYSTOLE A4C: 15.1 CM2
RIGHT VENTRICLE ID DIMENSION: 3.7 CM
RV PSP: 42 MMHG
SL CV LEFT ATRIUM LENGTH A2C: 5.4 CM
SL CV LV EF: 60
SL CV PED ECHO LEFT VENTRICLE DIASTOLIC VOLUME (MOD BIPLANE) 2D: 142 ML
SL CV PED ECHO LEFT VENTRICLE SYSTOLIC VOLUME (MOD BIPLANE) 2D: 63 ML
TR MAX PG: 39 MMHG
TR PEAK VELOCITY: 3.1 M/S
TRICUSPID ANNULAR PLANE SYSTOLIC EXCURSION: 3.3 CM
TRICUSPID VALVE PEAK REGURGITATION VELOCITY: 3.14 M/S

## 2024-05-09 PROCEDURE — 93225 XTRNL ECG REC<48 HRS REC: CPT

## 2024-05-09 PROCEDURE — 93226 XTRNL ECG REC<48 HR SCAN A/R: CPT

## 2024-05-09 PROCEDURE — 93306 TTE W/DOPPLER COMPLETE: CPT

## 2024-05-09 PROCEDURE — 93306 TTE W/DOPPLER COMPLETE: CPT | Performed by: INTERNAL MEDICINE

## 2024-05-10 NOTE — PROGRESS NOTES
PT Evaluation     Today's date: 2024  Patient name: Amando Rivas  : 1950  MRN: [id number]  Referring provider: Gutzweiler, Julie Lynn,*  Dx:   Encounter Diagnosis     ICD-10-CM    1. Cervical radiculopathy at C5  M54.12 Ambulatory Referral to Physical Therapy      2. Neck pain, musculoskeletal  M54.2 Ambulatory Referral to Physical Therapy          Start Time: 1058  Stop Time: 1155  Total time in clinic (min): 57 minutes    Assessment  Assessment details: Amando Rivas is a pleasant 73 y.o. male with a referred dx of Right C5-6 cervical radiculopathy/acute exacerbation of chronic cervical strain from Gutzweiler, Julie Lynn, PA-C.  No further referral appears necessary at this time based upon examination results. Physical exam is most consistent with right-sided mechanical joint derangement with cervical paraspinal muscle involvement. Upon further clinical testing, patient presents with the following deficits: active and passive cervical motor dysfunction, right-sided joint closing restriction, soft tissue restriction of right cervical paraspinals, tenderness to palpation of restricted muscles, decrease cervical proprioception, and decrease postural/DNF neuromuscular control. These deficits and impairments result in pain with cervical movement that limits his range needed for driving and overall causes discomfort. Patient responded positively to brief manual therapy with improvement in cervical ROM and decreased pain. Pt was provided with a basic HEP focused on gradual cervical mobility, stretches, and proprioception work which will be reviewed in the upcoming session. Pt able to demonstrate HEP with good technique and no pain. Educated pt to stop any exercises causing pain increase, pt verbalizes understanding. Pt was educated on anatomy and physiology of diagnosis and demonstrated verbal understanding. Positive prognostic indicators include positive attitude toward recovery, good understanding of  diagnosis and treatment plan options, and absence of peripheralization. Negative prognostic indicators include chronicity of symptoms, hypertension, and high symptom irritability. Pt would benefit from skilled OP physical therapy to address these, and the below impairments in order to optimize outcomes and promote return to functional baseline.         Patient verbalized understanding of POC.    Please contact me if you have any questions or recommendations. Thank you for the referral and the opportunity to share in Alan's care.      Impairments: abnormal muscle firing, abnormal muscle tone, abnormal or restricted ROM, abnormal movement, activity intolerance, lacks appropriate home exercise program, pain with function, poor posture  and poor body mechanics    Symptom irritability: highUnderstanding of Dx/Px/POC: good   Prognosis: good    Goals    Short Term Goals:  In 2-4 weeks, the patient will:  1. Pt will report at least a 25% reduction in subjective pain complaints/symptoms to better manage ADLs and household chores.   2. Pt will have 25-50% improvement in his cervical AROM  3. Pt independent with initial HEP, rationale, technique and frequency, for ROM and pain control.      Long Term Goals:  In 6 weeks, the patient will:  1. Pt will have WNL cervical ROM pain-free  2. FOTO to greater than predicted value.  3. Independent with comprehensive HEP upon discharge.  4. Pt will be able to perform ADLs, iADLS, and household duties with minimal restriction indicating return to PLOF.  5. Pt independent with rationale, technique and plan for performance of advanced HEP to ensure independent self-management of symptoms upon discharge.      Plan  Patient would benefit from: PT eval and skilled physical therapy  Referral necessary: No  Planned modality interventions: biofeedback  Planned therapy interventions: activity modification, ADL retraining, joint mobilization, manual therapy, massage, behavior modification, body  mechanics training, muscle pump exercises, motor coordination training, nerve gliding, neuromuscular re-education, patient education, postural training, self care, sensory integrative techniques, strengthening, stretching, therapeutic activities, therapeutic exercise, therapeutic training, home exercise program, graded motor, graded exercise, graded activity, functional ROM exercises and IASTM  Frequency: 1x week  Plan of Care beginning date: 2024  Plan of Care expiration date: 2024  Treatment plan discussed with: patient        Subjective Evaluation    History of Present Illness  Mechanism of injury: Patient presents for initial PT session regarding chronic right-sided neck pain (6 months). Patient has not tried PT or exercises for this acute on chronic episode, mostly rx medication with minimal relief. Pain is localized to the right side with no radiating symptoms down the UE. Pain is worse with right side-bending and rotation. Pain does affect his ability to turn his neck with driving.     Denies numbness or paresthesias. Denies jaw pain, difficulty chewing/swallowing or vision changes.     Goals: no pain with movement          Recurrent probem    Quality of life: good    Patient Goals  Patient goals for therapy: increased strength, independence with ADLs/IADLs, improved balance, decreased pain and increased motion    Pain  Current pain ratin  At best pain ratin  At worst pain ratin  Location: R Neck Pain  Quality: dull ache, discomfort, sharp, pulling and tight  Relieving factors: rest, relaxation, support, change in position and medications  Progression: worsening    Social Support  Lives with: spouse    Employment status: working (Self-employeed, )  Exercise history: None      Diagnostic Tests  CT scan: normal  Treatments  Previous treatment: medication  Current treatment: physical therapy        Objective      Red Flags: Uncontrolled HTN    Vitals:  Type: Manual on  LUE  BP: 140/82  HR: 66bpm  SpO2: 98%    Postural Findings:              Head Position  Protracted x Neutral  Retracted   Scapular Position  Protracted x Neutral  Retracted   Thoracic Spine x Inc Kyphosis  Neutral         NEURO Screen  Reflexes  Right Left   Biceps (C5-C6) 2+ 2+   Brachioradialis (C5-C6) 2+ 2+   Triceps (C7-C8) 2+ 2+   Curry's N N   Clonus N N       UE Myotomes:  Nerve Root Test Action RIGHT:   2024     LEFT:   2024         C3 Neck side flexion intact intact   C4 Shoulder elevation intact intact   C5 Shoulder abduction intact intact   C6 Elbow Flexion and wrist extension intact intact   C7 Elbow extension and wrist flexion intact intact   C8 Thumb extension and ulnar deviation intact intact   T1 Hand intrinsics intact intact     UE Dermatomes  Nerve Root Test Action RIGHT:   2024 LEFT:   2024       C2 Chandler/Occipital Protuberance Intact Intact   C3 Lateral Neck intact intact   C4 Clavicle/Upper Trap intact intact   C5 Deltoid to Lateral Epicondyle intact intact   C6 Thumb intact intact   C7 Index Finger intact    intact      C8 5th Digit intact    intact      T1 Medial Antecubital Fossa/Forearm intact intact          Cervical Vascular Screenin-D's   Dizziness   Diploplia   Drop Attacks   Dysphagia   Dysarthria  3-N's   Nausea   Nystagmus    Numbness  The above were all  Negative      Upper Cervical Ligament Testing:   Transverse ligament stress test   Alar Ligament stress test   Sharp-Lee   *The above were all  Negative    Strength and ROM evaluated B from a regional biomechanical perspective and values relevant to this episode recorded in tables below    Range of Motion measurements for the Cervical Spine (in degrees)     Joint Motion Right:   2024   Left:   2024     Flexion WNL P! -   Extension 75%  -   Rotation 25% P!  WNL   Lateral Flexion 25% P! 50% P!   Protraction WNL    Retraction WNL      UE Manual Muscle Testing  Test Action RIGHT   2024   LEFT    5/13/2024     Shoulder elevation 4/5 4/5   Shoulder flexion 5/5 5/5   Shoulder abduction 5/5 5/5   Shoulder IR 5/5 5/5   Shoulder ER 5/5 5/5   Elbow Flexion 5/5 5/5   Elbow extension 5/5 5/5       Additional Clinical Tests:  Palpation:tenderness to palpation along R cervical paraspinals including UT, SCM, Levator Scap   Directional Preference: None  Joint Mobility: R-sided Closing Restriction      Cervical Radiculopathy Test Item Cluster (Wainner RS et al. SPINE, 2003)  Any 3 above + = + LR of 6.1  Any 4 above + = + LR of 30.3  Test / Measure  5/13/2024   Upper Limb Tension Test A N   Spurling's A N   Distraction N   C-Rotation < 60 ipsilateral side Pos         Repeated Motions  C/s retractions: No worsening          Precautions: HTN    POC expires Unit limit Auth Expiration date PT/OT + Visit Limit?   7/8/24 4 4/25/25 B[facility] PCY           Pertinent Findings:      POC End Date: 7/8/24                                                                                          Test / Measure  5/13/2024   FOTO (Predicted 67) 61   Cervical Rotation 25% to R P!   Cervical Right SB 25% P!   Tenderness to palpation R UT, LS, SCM       Access Code: [email or web address]  URL: [email or web address]  Date: 05/13/2024  Prepared by: Pennie Barnes    Exercises  - Supine Chin Tuck  - 1 x daily - 7 x weekly - 1-2 sets - 10 reps - 5 seconds hold  - Seated Levator Scapulae Stretch  - 1-4 x daily - 7 x weekly - 1 sets - 10 reps - 10 second hold  - Seated Upper Trapezius Stretch  - 1-4 x daily - 7 x weekly - 1 sets - 10 reps - 10 second hold  - Seated Assisted Cervical Rotation with Towel  - 1-2 x daily - 7 x weekly - 1 sets - 10 reps - 10 seconds hold  - Cervical Extension AROM with Strap  - 1-2 x daily - 7 x weekly - 1-2 sets - 10 reps    Manuals 5/13            Cervical PROM AR            Joint Mob: Lateral Glides and R Downward Glides AR            R STM or ART of UT, LS, SCM             Cervical SOR AR            Neuro Re-Ed                                                                                                         Ther Ex             HEP/Patient Education/PT Diagnosis AR 23'            UBE Retro              Supine Chin Tucks             Supine Chin Tucks + Rotation             Supine SB and Rot PROM + self-OP             Shrugs + Cervical Rotation                                                                 Ther Activity                                       Gait Training                                       Modalities

## 2024-05-13 ENCOUNTER — EVALUATION (OUTPATIENT)
Dept: PHYSICAL THERAPY | Facility: REHABILITATION | Age: 74
End: 2024-05-13
Payer: COMMERCIAL

## 2024-05-13 DIAGNOSIS — M54.2 NECK PAIN, MUSCULOSKELETAL: ICD-10-CM

## 2024-05-13 DIAGNOSIS — M54.12 CERVICAL RADICULOPATHY AT C5: ICD-10-CM

## 2024-05-13 PROCEDURE — 97140 MANUAL THERAPY 1/> REGIONS: CPT

## 2024-05-13 PROCEDURE — 97110 THERAPEUTIC EXERCISES: CPT

## 2024-05-13 PROCEDURE — 97161 PT EVAL LOW COMPLEX 20 MIN: CPT

## 2024-05-17 ENCOUNTER — OFFICE VISIT (OUTPATIENT)
Age: 74
End: 2024-05-17

## 2024-05-17 VITALS
HEIGHT: 68 IN | SYSTOLIC BLOOD PRESSURE: 160 MMHG | TEMPERATURE: 98.4 F | DIASTOLIC BLOOD PRESSURE: 80 MMHG | HEART RATE: 66 BPM | WEIGHT: 202.4 LBS | BODY MASS INDEX: 30.68 KG/M2 | OXYGEN SATURATION: 96 % | RESPIRATION RATE: 18 BRPM

## 2024-05-17 DIAGNOSIS — Z00.00 MEDICARE ANNUAL WELLNESS VISIT, SUBSEQUENT: Primary | ICD-10-CM

## 2024-05-17 DIAGNOSIS — I10 BENIGN ESSENTIAL HYPERTENSION: ICD-10-CM

## 2024-05-17 RX ORDER — AMLODIPINE BESYLATE AND BENAZEPRIL HYDROCHLORIDE 10; 20 MG/1; MG/1
1 CAPSULE ORAL DAILY
Start: 2024-05-17

## 2024-05-17 NOTE — PATIENT INSTRUCTIONS
Please only take BP one time a day.   Start taking two of your Lotrel 5-10 daily.           Medicare Preventive Visit Patient Instructions  Thank you for completing your Welcome to Medicare Visit or Medicare Annual Wellness Visit today. Your next wellness visit will be due in one year (5/22/2025).  The screening/preventive services that you may require over the next 5-10 years are detailed below. Some tests may not apply to you based off risk factors and/or age. Screening tests ordered at today's visit but not completed yet may show as past due. Also, please note that scanned in results may not display below.  Preventive Screenings:  Service Recommendations Previous Testing/Comments   Colorectal Cancer Screening  Colonoscopy    Fecal Occult Blood Test (FOBT)/Fecal Immunochemical Test (FIT)  Fecal DNA/Cologuard Test  Flexible Sigmoidoscopy Age: 45-75 years old   Colonoscopy: every 10 years (May be performed more frequently if at higher risk)  OR  FOBT/FIT: every 1 year  OR  Cologuard: every 3 years  OR  Sigmoidoscopy: every 5 years  Screening may be recommended earlier than age 45 if at higher risk for colorectal cancer. Also, an individualized decision between you and your healthcare provider will decide whether screening between the ages of 76-85 would be appropriate. Colonoscopy: Not on file  FOBT/FIT: Not on file  Cologuard: Not on file  Sigmoidoscopy: Not on file    Screening Not Indicated     Prostate Cancer Screening Individualized decision between patient and health care provider in men between ages of 55-69   Medicare will cover every 12 months beginning on the day after your 50th birthday PSA: No results in last 5 years     Screening Not Indicated     Hepatitis C Screening Once for adults born between 1945 and 1965  More frequently in patients at high risk for Hepatitis C Hep C Antibody: 08/25/2017    Screening Current   Diabetes Screening 1-2 times per year if you're at risk for diabetes or have  pre-diabetes Fasting glucose: 127 mg/dL (2/17/2022)  A1C: No results in last 5 years (No results in last 5 years)  Screening Current   Cholesterol Screening Once every 5 years if you don't have a lipid disorder. May order more often based on risk factors. Lipid panel: Not on file  Screening Not Indicated  History Lipid Disorder      Other Preventive Screenings Covered by Medicare:  Abdominal Aortic Aneurysm (AAA) Screening: covered once if your at risk. You're considered to be at risk if you have a family history of AAA or a male between the age of 65-75 who smoking at least 100 cigarettes in your lifetime.  Lung Cancer Screening: covers low dose CT scan once per year if you meet all of the following conditions: (1) Age 55-77; (2) No signs or symptoms of lung cancer; (3) Current smoker or have quit smoking within the last 15 years; (4) You have a tobacco smoking history of at least 20 pack years (packs per day x number of years you smoked); (5) You get a written order from a healthcare provider.  Glaucoma Screening: covered annually if you're considered high risk: (1) You have diabetes OR (2) Family history of glaucoma OR (3)  aged 50 and older OR (4)  American aged 65 and older  Osteoporosis Screening: covered every 2 years if you meet one of the following conditions: (1) Have a vertebral abnormality; (2) On glucocorticoid therapy for more than 3 months; (3) Have primary hyperparathyroidism; (4) On osteoporosis medications and need to assess response to drug therapy.  HIV Screening: covered annually if you're between the age of 15-65. Also covered annually if you are younger than 15 and older than 65 with risk factors for HIV infection. For pregnant patients, it is covered up to 3 times per pregnancy.    Immunizations:  Immunization Recommendations   Influenza Vaccine Annual influenza vaccination during flu season is recommended for all persons aged >= 6 months who do not have  contraindications   Pneumococcal Vaccine   * Pneumococcal conjugate vaccine = PCV13 (Prevnar 13), PCV15 (Vaxneuvance), PCV20 (Prevnar 20)  * Pneumococcal polysaccharide vaccine = PPSV23 (Pneumovax) Adults 19-65 yo with certain risk factors or if 65+ yo  If never received any pneumonia vaccine: recommend Prevnar 20 (PCV20)  Give PCV20 if previously received 1 dose of PCV13 or PPSV23   Hepatitis B Vaccine 3 dose series if at intermediate or high risk (ex: diabetes, end stage renal disease, liver disease)   Respiratory syncytial virus (RSV) Vaccine - COVERED BY MEDICARE PART D  * RSVPreF3 (Arexvy) CDC recommends that adults 60 years of age and older may receive a single dose of RSV vaccine using shared clinical decision-making (SCDM)   Tetanus (Td) Vaccine - COST NOT COVERED BY MEDICARE PART B Following completion of primary series, a booster dose should be given every 10 years to maintain immunity against tetanus. Td may also be given as tetanus wound prophylaxis.   Tdap Vaccine - COST NOT COVERED BY MEDICARE PART B Recommended at least once for all adults. For pregnant patients, recommended with each pregnancy.   Shingles Vaccine (Shingrix) - COST NOT COVERED BY MEDICARE PART B  2 shot series recommended in those 19 years and older who have or will have weakened immune systems or those 50 years and older     Health Maintenance Due:      Topic Date Due   • Colorectal Cancer Screening  Never done   • Hepatitis C Screening  Completed     Immunizations Due:  There are no preventive care reminders to display for this patient.  Advance Directives   What are advance directives?  Advance directives are legal documents that state your wishes and plans for medical care. These plans are made ahead of time in case you lose your ability to make decisions for yourself. Advance directives can apply to any medical decision, such as the treatments you want, and if you want to donate organs.   What are the types of advance directives?   There are many types of advance directives, and each state has rules about how to use them. You may choose a combination of any of the following:  Living will:  This is a written record of the treatment you want. You can also choose which treatments you do not want, which to limit, and which to stop at a certain time. This includes surgery, medicine, IV fluid, and tube feedings.   Durable power of  for healthcare (DPAHC):  This is a written record that states who you want to make healthcare choices for you when you are unable to make them for yourself. This person, called a proxy, is usually a family member or a friend. You may choose more than 1 proxy.  Do not resuscitate (DNR) order:  A DNR order is used in case your heart stops beating or you stop breathing. It is a request not to have certain forms of treatment, such as CPR. A DNR order may be included in other types of advance directives.  Medical directive:  This covers the care that you want if you are in a coma, near death, or unable to make decisions for yourself. You can list the treatments you want for each condition. Treatment may include pain medicine, surgery, blood transfusions, dialysis, IV or tube feedings, and a ventilator (breathing machine).  Values history:  This document has questions about your views, beliefs, and how you feel and think about life. This information can help others choose the care that you would choose.  Why are advance directives important?  An advance directive helps you control your care. Although spoken wishes may be used, it is better to have your wishes written down. Spoken wishes can be misunderstood, or not followed. Treatments may be given even if you do not want them. An advance directive may make it easier for your family to make difficult choices about your care.   Fall Prevention    Fall prevention  includes ways to make your home and other areas safer. It also includes ways you can move more carefully to  prevent a fall. Health conditions that cause changes in your blood pressure, vision, or muscle strength and coordination may increase your risk for falls. Medicines may also increase your risk for falls if they make you dizzy, weak, or sleepy.   Fall prevention tips:   Stand or sit up slowly.    Use assistive devices as directed.    Wear shoes that fit well and have soles that .    Wear a personal alarm.    Stay active.    Manage your medical conditions.    Home Safety Tips:  Add items to prevent falls in the bathroom.    Keep paths clear.    Install bright lights in your home.    Keep items you use often on shelves within reach.    Paint or place reflective tape on the edges of your stairs.    Weight Management   Why it is important to manage your weight:  Being overweight increases your risk of health conditions such as heart disease, high blood pressure, type 2 diabetes, and certain types of cancer. It can also increase your risk for osteoarthritis, sleep apnea, and other respiratory problems. Aim for a slow, steady weight loss. Even a small amount of weight loss can lower your risk of health problems.  How to lose weight safely:  A safe and healthy way to lose weight is to eat fewer calories and get regular exercise. You can lose up about 1 pound a week by decreasing the number of calories you eat by 500 calories each day.   Healthy meal plan for weight management:  A healthy meal plan includes a variety of foods, contains fewer calories, and helps you stay healthy. A healthy meal plan includes the following:  Eat whole-grain foods more often.  A healthy meal plan should contain fiber. Fiber is the part of grains, fruits, and vegetables that is not broken down by your body. Whole-grain foods are healthy and provide extra fiber in your diet. Some examples of whole-grain foods are whole-wheat breads and pastas, oatmeal, brown rice, and bulgur.  Eat a variety of vegetables every day.  Include dark, leafy greens  such as spinach, kale, zak greens, and mustard greens. Eat yellow and orange vegetables such as carrots, sweet potatoes, and winter squash.   Eat a variety of fruits every day.  Choose fresh or canned fruit (canned in its own juice or light syrup) instead of juice. Fruit juice has very little or no fiber.  Eat low-fat dairy foods.  Drink fat-free (skim) milk or 1% milk. Eat fat-free yogurt and low-fat cottage cheese. Try low-fat cheeses such as mozzarella and other reduced-fat cheeses.  Choose meat and other protein foods that are low in fat.  Choose beans or other legumes such as split peas or lentils. Choose fish, skinless poultry (chicken or turkey), or lean cuts of red meat (beef or pork). Before you cook meat or poultry, cut off any visible fat.   Use less fat and oil.  Try baking foods instead of frying them. Add less fat, such as margarine, sour cream, regular salad dressing and mayonnaise to foods. Eat fewer high-fat foods. Some examples of high-fat foods include french fries, doughnuts, ice cream, and cakes.  Eat fewer sweets.  Limit foods and drinks that are high in sugar. This includes candy, cookies, regular soda, and sweetened drinks.  Exercise:  Exercise at least 30 minutes per day on most days of the week. Some examples of exercise include walking, biking, dancing, and swimming. You can also fit in more physical activity by taking the stairs instead of the elevator or parking farther away from stores. Ask your healthcare provider about the best exercise plan for you.      © Copyright [employer] 2018 Information is for End User's use only and may not be sold, redistributed or otherwise used for commercial purposes. All illustrations and images included in CareNotes® are the copyrighted property of [employer] or [employer]

## 2024-05-17 NOTE — PROGRESS NOTES
Ambulatory Visit  Name: Amando Rivas      : 1950      MRN: [id number]  Encounter Provider: Ariana Garza MD  Encounter Date: 2024   Encounter department: [facility]    Assessment & Plan   1. Medicare annual wellness visit, subsequent  2. Benign essential hypertension  -     amLODIPine-benazepril (LOTREL) 10-20 MG per capsule; Take 1 capsule by mouth daily  3. BMI 30.0-30.9,adult    Increase lotrel from 5-10 mg daily to 10-20 mg daily. Continue home BP log. Return in approximately 3 weeks for follow up HTN.     BMI Counseling: Body mass index is 30.77 kg/m². The BMI is above normal. Nutrition recommendations include 3-5 servings of fruits/vegetables daily, increasing intake of lean protein, and reducing intake of saturated fat and trans fat. Exercise recommendations include exercising 3-5 times per week and strength training exercises.    Depression Screening and Follow-up Plan: Patient was screened for depression during today's encounter. They screened negative with a PHQ-2 score of 0.    Falls Plan of Care: balance, strength, and gait training instructions were provided. Medications that increase falls were reviewed.     Preventive health issues were discussed with patient, and age appropriate screening tests were ordered as noted in patient's After Visit Summary. Personalized health advice and appropriate referrals for health education or preventive services given if needed, as noted in patient's After Visit Summary.      Emotional and Mental Well-being, Sleep, Connectedness Assessment and Intervention:    Sleep/stress assessment performed      Tobacco and Toxic Substance Assessment and Intervention:     Tobacco use screening performed    Alcohol and drug use screening performed      History of Present Illness     HPI   Patient presents today for follow up on HTN and medicare AWV. Since last appointment, patient had labwork, holter monitor, echocardiogram, and vas  carotid duplex study. Results reviewed with patient.     Home BP log demonstrates consistent BP similar to today, despite patient taking lotrel 5-10 daily. Patient denies any palpitations, chest tightness, SOB, lightheadedness, or dizziness. He went to PT for his neck pain, which has helped significantly.     Patient Care Team:  Efra Aguilera MD as PCP - General (Family Medicine)    Review of Systems   Constitutional:  Negative for chills, fatigue and fever.   HENT:  Negative for congestion and rhinorrhea.    Eyes:  Negative for photophobia, pain and visual disturbance.   Respiratory:  Negative for chest tightness and shortness of breath.    Cardiovascular:  Negative for chest pain.   Gastrointestinal:  Negative for abdominal pain, constipation, diarrhea, nausea and vomiting.   Endocrine: Negative for polydipsia and polyuria.   Genitourinary:  Negative for difficulty urinating.   Musculoskeletal:  Negative for arthralgias and myalgias.   Skin:  Negative for color change and rash.   Neurological:  Negative for dizziness, light-headedness and headaches.   Hematological:  Negative for adenopathy.   Psychiatric/Behavioral:  Negative for sleep disturbance.      Medical History Reviewed by provider this encounter:  Tobacco  Allergies  Meds  Problems  Med Hx  Surg Hx  Fam Hx       Annual Wellness Visit Questionnaire   Amando is here for his Subsequent Wellness visit. Last Medicare Wellness visit information reviewed, patient interviewed and updates made to the record today.      Health Risk Assessment:   Patient rates overall health as very good. Patient feels that their physical health rating is same. Patient is very satisfied with their life. Eyesight was rated as same. Hearing was rated as same. Patient feels that their emotional and mental health rating is same. Patients states they are never, rarely angry. Patient states they are sometimes unusually tired/fatigued. Pain experienced in the last 7 days has  been some. Patient's pain rating has been 2/10. Patient states that he has experienced no weight loss or gain in last 6 months.     Depression Screening:   PHQ-2 Score: 0      Fall Risk Screening:   In the past year, patient has experienced: history of falling in past year    Number of falls: 1  Injured during fall?: Yes    Feels unsteady when standing or walking?: No    Worried about falling?: No      Home Safety:  Patient does not have trouble with stairs inside or outside of their home. Patient has working smoke alarms and has working carbon monoxide detector. Home safety hazards include: none.     Nutrition:   Current diet is Regular.     Medications:   Patient is currently taking over-the-counter supplements. OTC medications include: see medication list. Patient is able to manage medications.     Activities of Daily Living (ADLs)/Instrumental Activities of Daily Living (IADLs):   Walk and transfer into and out of bed and chair?: Yes  Dress and groom yourself?: Yes    Bathe or shower yourself?: Yes    Feed yourself? Yes  Do your laundry/housekeeping?: Yes  Manage your money, pay your bills and track your expenses?: Yes  Make your own meals?: Yes    Do your own shopping?: Yes    Previous Hospitalizations:   Any hospitalizations or ED visits within the last 12 months?: Yes    How many hospitalizations have you had in the last year?: 1-2    Advance Care Planning:   Living will: Yes    Durable POA for healthcare: Yes    Advanced directive: Yes      Cognitive Screening:   Provider or family/friend/caregiver concerned regarding cognition?: No    PREVENTIVE SCREENINGS      Cardiovascular Screening:    General: Screening Not Indicated and History Lipid Disorder      Diabetes Screening:     General: Screening Current      Colorectal Cancer Screening:     General: Screening Not Indicated      Prostate Cancer Screening:    General: Screening Not Indicated      Osteoporosis Screening:    General: Screening Not Indicated      " Abdominal Aortic Aneurysm (AAA) Screening:    Risk factors include: age between 65-74 yo        General: Screening Not Indicated      Lung Cancer Screening:     General: Screening Not Indicated      Hepatitis C Screening:    General: Screening Current    Screening, Brief Intervention, and Referral to Treatment (SBIRT)    Screening  Typical number of drinks in a day: 0  Typical number of drinks in a week: 0  Interpretation: Low risk drinking behavior.    Single Item Drug Screening:  How often have you used an illegal drug (including marijuana) or a prescription medication for non-medical reasons in the past year? never    Single Item Drug Screen Score: 0  Interpretation: Negative screen for possible drug use disorder    Brief Intervention  Alcohol & drug use screenings were reviewed. No concerns regarding substance use disorder identified.     Other Counseling Topics:   Car/seat belt/driving safety and calcium and vitamin D intake and regular weightbearing exercise.     Social Determinants of Health     Financial Resource Strain: Low Risk  (4/14/2023)    Overall Financial Resource Strain (CARDIA)     Difficulty of Paying Living Expenses: Not very hard   Food Insecurity: Patient Declined (5/17/2024)    Hunger Vital Sign     Worried About Running Out of Food in the Last Year: Patient declined     Ran Out of Food in the Last Year: Patient declined   Transportation Needs: No Transportation Needs (5/17/2024)    PRAPARE - Transportation     Lack of Transportation (Medical): No     Lack of Transportation (Non-Medical): No   Housing Stability: Unknown (5/17/2024)    Housing Stability Vital Sign     Unable to Pay for Housing in the Last Year: No     Homeless in the Last Year: No   Utilities: Patient Declined (5/17/2024)    [facility] Utilities     Threatened with loss of utilities: Patient declined     No results found.    Objective     /80   Pulse 66   Temp 98.4 °F (36.9 °C)   Resp 18   Ht 5' 8\" (1.727 m)   Wt 91.8 kg " (202 lb 6.4 oz)   SpO2 96%   BMI 30.77 kg/m²     Physical Exam  Vitals and nursing note reviewed.   Constitutional:       General: He is not in acute distress.     Appearance: Normal appearance. He is not ill-appearing, toxic-appearing or diaphoretic.   HENT:      Head: Normocephalic and atraumatic.      Right Ear: External ear normal.      Left Ear: External ear normal.      Nose: Nose normal. No congestion or rhinorrhea.      Mouth/Throat:      Mouth: Mucous membranes are moist.   Eyes:      General: No scleral icterus.        Right eye: No discharge.         Left eye: No discharge.      Conjunctiva/sclera: Conjunctivae normal.   Cardiovascular:      Rate and Rhythm: Normal rate and regular rhythm.      Heart sounds: Normal heart sounds. No murmur heard.     No friction rub. No gallop.   Pulmonary:      Effort: Pulmonary effort is normal. No respiratory distress.      Breath sounds: Normal breath sounds. No stridor. No wheezing, rhonchi or rales.   Chest:      Chest wall: No tenderness.   Abdominal:      General: Bowel sounds are normal. There is no distension.      Palpations: Abdomen is soft.      Tenderness: There is no abdominal tenderness. There is no guarding.   Musculoskeletal:         General: No swelling, tenderness, deformity or signs of injury.      Right lower leg: No edema.      Left lower leg: No edema.   Lymphadenopathy:      Cervical: No cervical adenopathy.   Skin:     General: Skin is warm and dry.      Capillary Refill: Capillary refill takes less than 2 seconds.      Coloration: Skin is not jaundiced or pale.      Findings: No bruising, erythema, lesion or rash.   Neurological:      Mental Status: He is alert.      Motor: No weakness.      Gait: Gait normal.   Psychiatric:         Mood and Affect: Mood normal.         Behavior: Behavior normal.         Thought Content: Thought content normal.         Judgment: Judgment normal.         Administrative Statements   I have spent a total time of  45 minutes on 5/17/24 In caring for this patient including Instructions for management, Patient and family education, Risk factor reductions, Impressions, Documenting in the medical record, Reviewing / ordering tests, medicine, procedures  , and Obtaining or reviewing history  .      Ariana Garza MD    PGY-2

## 2024-05-20 ENCOUNTER — APPOINTMENT (OUTPATIENT)
Dept: PHYSICAL THERAPY | Facility: REHABILITATION | Age: 74
End: 2024-05-20
Payer: COMMERCIAL

## 2024-05-20 NOTE — PROGRESS NOTES
Patient called 5/17 with symptom resolution. Patient self-discharged due to no further concerns regarding his neck pain.

## 2024-05-29 ENCOUNTER — APPOINTMENT (OUTPATIENT)
Dept: PHYSICAL THERAPY | Facility: REHABILITATION | Age: 74
End: 2024-05-29
Payer: COMMERCIAL

## 2024-06-06 ENCOUNTER — OFFICE VISIT (OUTPATIENT)
Dept: OBGYN CLINIC | Facility: CLINIC | Age: 74
End: 2024-06-06
Payer: COMMERCIAL

## 2024-06-06 VITALS — BODY MASS INDEX: 30.62 KG/M2 | HEIGHT: 68 IN | WEIGHT: 202 LBS

## 2024-06-06 DIAGNOSIS — M65.4 DE QUERVAIN'S TENOSYNOVITIS, LEFT: Primary | ICD-10-CM

## 2024-06-06 PROCEDURE — 20550 NJX 1 TENDON SHEATH/LIGAMENT: CPT | Performed by: STUDENT IN AN ORGANIZED HEALTH CARE EDUCATION/TRAINING PROGRAM

## 2024-06-06 PROCEDURE — 99214 OFFICE O/P EST MOD 30 MIN: CPT | Performed by: STUDENT IN AN ORGANIZED HEALTH CARE EDUCATION/TRAINING PROGRAM

## 2024-06-06 RX ORDER — BETAMETHASONE SODIUM PHOSPHATE AND BETAMETHASONE ACETATE 3; 3 MG/ML; MG/ML
6 INJECTION, SUSPENSION INTRA-ARTICULAR; INTRALESIONAL; INTRAMUSCULAR; SOFT TISSUE
Status: COMPLETED | OUTPATIENT
Start: 2024-06-06 | End: 2024-06-06

## 2024-06-06 RX ORDER — ROPIVACAINE HYDROCHLORIDE 5 MG/ML
1 INJECTION, SOLUTION EPIDURAL; INFILTRATION; PERINEURAL
Status: COMPLETED | OUTPATIENT
Start: 2024-06-06 | End: 2024-06-06

## 2024-06-06 RX ADMIN — BETAMETHASONE SODIUM PHOSPHATE AND BETAMETHASONE ACETATE 6 MG: 3; 3 INJECTION, SUSPENSION INTRA-ARTICULAR; INTRALESIONAL; INTRAMUSCULAR; SOFT TISSUE at 10:15

## 2024-06-06 RX ADMIN — ROPIVACAINE HYDROCHLORIDE 1 ML: 5 INJECTION, SOLUTION EPIDURAL; INFILTRATION; PERINEURAL at 10:15

## 2024-06-06 NOTE — PROGRESS NOTES
ORTHOPAEDIC HAND, WRIST, AND ELBOW OFFICE  VISIT      ASSESSMENT/PLAN:      Diagnoses and all orders for this visit:    De Quervain's tenosynovitis, left  -     Hand/upper extremity injection: L extensor compartment 1  -     ropivacaine (NAROPIN) 0.5 % injection 1 mL  -     betamethasone acetate-betamethasone sodium phosphate (CELESTONE) injection 6 mg          73 y.o. male with left de quervain's and resolved left long TF    The patient did see good relief from previous injections. We discussed a repeat left 1st dorsal compartment injection. The patient was agreeable to this. He consented and underwent a left 1st dorsal compartment CSI in the office today without any complications. He can continue with the thumb spica brace as needed. He may follow up with me as needed.    Follow Up:  PRN       To Do Next Visit:             Ed Amin MD  Attending, Orthopaedic Surgery  Hand, Wrist, and Elbow Surgery  [facility]    ______________________________________________________________________________________________    CHIEF COMPLAINT:  Chief Complaint   Patient presents with   • Left Wrist - Follow-up       SUBJECTIVE:  Patient is a 73 y.o. RHD male who presents today for follow up of left wrist pain. At his last visit in January he underwent a left long TF injection and a de quervain's injection which he states provided him with good relief. He denies any locking or triggering. He states the pain to the radial aspect of his wrist has returned. He has been using the thumb spica brace as needed.    Occupation:       I have personally reviewed all the relevant PMH, PSH, SH, FH, Medications and allergies      PAST MEDICAL HISTORY:  Past Medical History:   Diagnosis Date   • Cataract, right eye     last assessed 04/21/2015   • CPAP (continuous positive airway pressure) dependence     no currently use   • Hypertension    • Pneumonia    • Sleep apnea        PAST SURGICAL  HISTORY:  Past Surgical History:   Procedure Laterality Date   • CATARACT EXTRACTION, BILATERAL     • EXAMINATION UNDER ANESTHESIA N/A 8/25/2022    Procedure: EXAM UNDER ANESTHESIA (EUA) Drug Induced Sleep Endoscopy;  Surgeon: iTn Rodriguez MD;  [facility];  Service: ENT   • MYRINGOTOMY W/ TUBES     • RI REPAIR NASAL VESTIBULAR STENOSIS Bilateral 8/25/2022    Procedure: REPAIR VESTIBULAR STENOSIS;  Surgeon: Tin Rodriguez MD;  [facility];  Service: ENT   • RI SEPTOPLASTY/SUBMUCOUS RESECJ W/WO CARTILAGE GRF N/A 8/25/2022    Procedure: SEPTOPLASTY;  Surgeon: Tin Rodriguez MD;  [facility];  Service: ENT   • RI SUBMUCOUS RESCJ INFERIOR TURBINATE PRTL/COMPL Bilateral 8/25/2022    Procedure: TURBINECTOMY;  Surgeon: Tin Rodriguez MD;  [facility];  Service: ENT       FAMILY HISTORY:  Family History   Problem Relation Age of Onset   • Hypertension Father    • No Known Problems Mother        SOCIAL HISTORY:  Social History     Tobacco Use   • Smoking status: Never   • Smokeless tobacco: Never   Vaping Use   • Vaping status: Never Used   Substance Use Topics   • Alcohol use: Yes     Alcohol/week: 2.0 standard drinks of alcohol     Types: 2 Shots of liquor per week   • Drug use: Never       MEDICATIONS:    Current Outpatient Medications:   •  amLODIPine-benazepril (LOTREL) 10-20 MG per capsule, Take 1 capsule by mouth daily, Disp: , Rfl:   •  hydrALAZINE (APRESOLINE) 10 mg tablet, Take 1 tablet (10 mg total) by mouth every 12 (twelve) hours as needed (for systolic blood pressure (top number) of 140 or greater), Disp: 90 tablet, Rfl: 0  •  predniSONE 10 mg tablet, Take 2 tabs twice daily for 3 days, then one tab twice daily for 3 days then one tab daily x 3 days.  Start tomorrow, first dose was given to you in the Emergency room (Patient not taking: Reported on 5/17/2024), Disp: 21 tablet, Rfl: 0  •  sildenafil (VIAGRA) 100 mg tablet, Take 1 tablet (100 mg total)  "by mouth daily as needed for erectile dysfunction for up to 10 days, Disp: 10 tablet, Rfl: 0  No current facility-administered medications for this visit.    ALLERGIES:  No Known Allergies        REVIEW OF SYSTEMS:  Musculoskeletal:        As noted in HPI.   All other systems reviewed and are negative.    VITALS:  There were no vitals filed for this visit.    LABS:  HgA1c: No results found for: \"HGBA1C\"  BMP:   Lab Results   Component Value Date    CALCIUM 9.1 04/25/2024    K 3.8 04/25/2024    CO2 25 04/25/2024     04/25/2024    BUN 12 04/25/2024    CREATININE 0.94 04/25/2024       _____________________________________________________  PHYSICAL EXAMINATION:  General: Well developed and well nourished, alert & oriented x 3, appears comfortable  Psychiatric: Normal  HEENT: Normocephalic, Atraumatic Trachea Midline, No torticollis  Pulmonary: No audible wheezing or respiratory distress   Abdomen/GI: Non tender, non distended   Cardiovascular: No pitting edema, 2+ radial pulse   Skin: No masses, erythema, lacerations, fluctation, ulcerations  Neurovascular: Sensation Intact to the Median, Ulnar, Radial Nerve, Motor Intact to the Median, Ulnar, Radial Nerve, and Pulses Intact  Musculoskeletal: Normal, except as noted in detailed exam and in HPI.      MUSCULOSKELETAL EXAMINATION:  Left wrist  TTP 1s dorsal compartment  Mild TTP thumb CMC  + finkelstein     ___________________________________________________  STUDIES REVIEWED:  No new studies to review         PROCEDURES PERFORMED:  Hand/upper extremity injection: L extensor compartment 1  Universal Protocol:  Consent: Verbal consent obtained.  Consent given by: patient  Patient identity confirmed: verbally with patient  Supporting Documentation  Indications: pain   Procedure Details  Condition:de Quervain's tenosynovitis Site: L extensor compartment 1   Preparation: Patient was prepped and draped in the usual sterile fashion  Needle size: 25 G  Ultrasound guidance: " no  Medications administered: 6 mg betamethasone acetate-betamethasone sodium phosphate 6 (3-3) mg/mL; 1 mL ropivacaine 0.5 %  Patient tolerance: patient tolerated the procedure well with no immediate complications  Dressing:  Sterile dressing applied              _____________________________________________________      Scribe Attestation    I,:  Stephani Burden MA am acting as a scribe while in the presence of the attending physician.:       I,:  Ed Amin MD personally performed the services described in this documentation    as scribed in my presence.:

## 2024-06-20 ENCOUNTER — OFFICE VISIT (OUTPATIENT)
Age: 74
End: 2024-06-20
Payer: COMMERCIAL

## 2024-06-20 VITALS
HEART RATE: 95 BPM | WEIGHT: 202.2 LBS | TEMPERATURE: 97.1 F | BODY MASS INDEX: 30.74 KG/M2 | OXYGEN SATURATION: 98 % | SYSTOLIC BLOOD PRESSURE: 132 MMHG | DIASTOLIC BLOOD PRESSURE: 62 MMHG

## 2024-06-20 DIAGNOSIS — I10 BENIGN ESSENTIAL HYPERTENSION: ICD-10-CM

## 2024-06-20 PROCEDURE — 99213 OFFICE O/P EST LOW 20 MIN: CPT

## 2024-06-20 PROCEDURE — G2211 COMPLEX E/M VISIT ADD ON: HCPCS

## 2024-06-20 RX ORDER — AMLODIPINE BESYLATE AND BENAZEPRIL HYDROCHLORIDE 10; 20 MG/1; MG/1
1 CAPSULE ORAL DAILY
Qty: 90 CAPSULE | Refills: 1 | Status: SHIPPED | OUTPATIENT
Start: 2024-06-20

## 2024-06-20 NOTE — PROGRESS NOTES
Ambulatory Visit  Name: Amando Rivas      : 1950      MRN: [id number]  Encounter Provider: Ariana Garza MD  Encounter Date: 2024   Encounter department: [facility]    Assessment & Plan   1. Benign essential hypertension  -     amLODIPine-benazepril (LOTREL) 10-20 MG per capsule; Take 1 capsule by mouth daily    BP has improved on current medication. Continue as ordered.     Return in approximately 3 weeks for f/u BP     Nutrition Assessment and Intervention:     Recommended completion of food recall journal      Physical Activity Assessment and Intervention:    Activity journal completion recommended      Emotional and Mental Well-being, Sleep, Connectedness Assessment and Intervention:    Sleep/stress assessment performed    Depression and anxiety screening performed and reviewed           History of Present Illness     HPI    Patient presents today for f/u HTN. He was last seen 24 in office for annual physical, and BP was 160/80. His lotrel was increased from 1 tab daily to 2 tab daily at that time.     Today patient reports he's been doing well on the medication. He's been tolerating it well with no significant side effects. His home BP log demonstrates improved blood pressures, with SBP typically 120-130s. He denies any lightheadedness/dizziness.       Review of Systems   Constitutional:  Negative for chills and fever.   HENT:  Negative for congestion and rhinorrhea.    Eyes:  Negative for discharge and redness.   Respiratory:  Positive for cough. Negative for shortness of breath.    Cardiovascular:  Negative for chest pain, palpitations and leg swelling.   Gastrointestinal:  Negative for abdominal pain, constipation, diarrhea, nausea and vomiting.   Endocrine: Negative for polydipsia and polyuria.   Genitourinary:  Negative for difficulty urinating and dysuria.   Musculoskeletal:  Negative for arthralgias and myalgias.   Skin:  Negative for color change and  rash.   Allergic/Immunologic: Negative for environmental allergies and food allergies.   Neurological:  Negative for dizziness, light-headedness and headaches.   Hematological:  Negative for adenopathy.   Psychiatric/Behavioral:  Negative for sleep disturbance.      Medical History Reviewed by provider this encounter:  Tobacco  Allergies  Meds  Problems  Med Hx  Surg Hx  Fam Hx       Current Outpatient Medications on File Prior to Visit   Medication Sig Dispense Refill    sildenafil (VIAGRA) 100 mg tablet Take 1 tablet (100 mg total) by mouth daily as needed for erectile dysfunction for up to 10 days 10 tablet 0     No current facility-administered medications on file prior to visit.      Objective     /62   Pulse 95   Temp (!) 97.1 °F (36.2 °C)   Wt 91.7 kg (202 lb 3.2 oz)   SpO2 98%   BMI 30.74 kg/m²     Physical Exam  Vitals and nursing note reviewed.   Constitutional:       General: He is not in acute distress.     Appearance: Normal appearance. He is not ill-appearing, toxic-appearing or diaphoretic.   HENT:      Head: Normocephalic and atraumatic.      Right Ear: External ear normal.      Left Ear: External ear normal.      Nose: Nose normal. No congestion or rhinorrhea.      Mouth/Throat:      Mouth: Mucous membranes are moist.   Eyes:      General: No scleral icterus.        Right eye: No discharge.         Left eye: No discharge.      Conjunctiva/sclera: Conjunctivae normal.   Cardiovascular:      Rate and Rhythm: Normal rate and regular rhythm.      Heart sounds: Normal heart sounds. No murmur heard.     No friction rub. No gallop.   Pulmonary:      Effort: Pulmonary effort is normal. No respiratory distress.      Breath sounds: Normal breath sounds. No wheezing, rhonchi or rales.   Abdominal:      General: There is no distension.      Palpations: Abdomen is soft.      Tenderness: There is no abdominal tenderness. There is no guarding.   Musculoskeletal:         General: No swelling or  deformity.      Cervical back: No tenderness.      Right lower leg: No edema.      Left lower leg: No edema.   Lymphadenopathy:      Cervical: No cervical adenopathy.   Skin:     General: Skin is warm and dry.      Capillary Refill: Capillary refill takes less than 2 seconds.      Coloration: Skin is not jaundiced.      Findings: No bruising, erythema or lesion.   Neurological:      Mental Status: He is alert.      Motor: No weakness.      Gait: Gait normal.   Psychiatric:         Behavior: Behavior normal.       Administrative Statements   I have spent a total time of 20 minutes on 06/20/24 In caring for this patient including Instructions for management, Patient and family education, Importance of tx compliance, Risk factor reductions, Impressions, Documenting in the medical record, and Obtaining or reviewing history  .      Ariana Garza MD    PGY-2

## 2024-07-26 DIAGNOSIS — N52.9 ERECTILE DYSFUNCTION, UNSPECIFIED ERECTILE DYSFUNCTION TYPE: ICD-10-CM

## 2024-07-26 RX ORDER — SILDENAFIL 100 MG/1
100 TABLET, FILM COATED ORAL DAILY PRN
Qty: 10 TABLET | Refills: 5 | Status: SHIPPED | OUTPATIENT
Start: 2024-07-26 | End: 2024-08-05

## 2024-08-30 ENCOUNTER — OFFICE VISIT (OUTPATIENT)
Dept: OBGYN CLINIC | Facility: CLINIC | Age: 74
End: 2024-08-30
Payer: COMMERCIAL

## 2024-08-30 VITALS — BODY MASS INDEX: 30.62 KG/M2 | WEIGHT: 202 LBS | HEIGHT: 68 IN

## 2024-08-30 DIAGNOSIS — M18.12 ARTHRITIS OF CARPOMETACARPAL (CMC) JOINT OF LEFT THUMB: ICD-10-CM

## 2024-08-30 DIAGNOSIS — M65.332 TRIGGER MIDDLE FINGER OF LEFT HAND: Primary | ICD-10-CM

## 2024-08-30 PROCEDURE — 99214 OFFICE O/P EST MOD 30 MIN: CPT | Performed by: STUDENT IN AN ORGANIZED HEALTH CARE EDUCATION/TRAINING PROGRAM

## 2024-08-30 PROCEDURE — 20600 DRAIN/INJ JOINT/BURSA W/O US: CPT | Performed by: STUDENT IN AN ORGANIZED HEALTH CARE EDUCATION/TRAINING PROGRAM

## 2024-08-30 PROCEDURE — 20550 NJX 1 TENDON SHEATH/LIGAMENT: CPT | Performed by: STUDENT IN AN ORGANIZED HEALTH CARE EDUCATION/TRAINING PROGRAM

## 2024-08-30 RX ORDER — ROPIVACAINE HYDROCHLORIDE 5 MG/ML
1 INJECTION, SOLUTION EPIDURAL; INFILTRATION; PERINEURAL
Status: COMPLETED | OUTPATIENT
Start: 2024-08-30 | End: 2024-08-30

## 2024-08-30 RX ORDER — BETAMETHASONE SODIUM PHOSPHATE AND BETAMETHASONE ACETATE 3; 3 MG/ML; MG/ML
6 INJECTION, SUSPENSION INTRA-ARTICULAR; INTRALESIONAL; INTRAMUSCULAR; SOFT TISSUE
Status: COMPLETED | OUTPATIENT
Start: 2024-08-30 | End: 2024-08-30

## 2024-08-30 RX ADMIN — BETAMETHASONE SODIUM PHOSPHATE AND BETAMETHASONE ACETATE 6 MG: 3; 3 INJECTION, SUSPENSION INTRA-ARTICULAR; INTRALESIONAL; INTRAMUSCULAR; SOFT TISSUE at 10:30

## 2024-08-30 RX ADMIN — ROPIVACAINE HYDROCHLORIDE 1 ML: 5 INJECTION, SOLUTION EPIDURAL; INFILTRATION; PERINEURAL at 10:30

## 2024-08-30 NOTE — PROGRESS NOTES
ORTHOPAEDIC HAND, WRIST, AND ELBOW OFFICE  VISIT      ASSESSMENT/PLAN:      Diagnoses and all orders for this visit:    Trigger middle finger of left hand  -     Hand/upper extremity injection: L long A1  -     ropivacaine (NAROPIN) 0.5 % injection 1 mL  -     betamethasone acetate-betamethasone sodium phosphate (CELESTONE) injection 6 mg    Arthritis of carpometacarpal (CMC) joint of left thumb  -     Small joint arthrocentesis: L thumb CMC  -     ropivacaine (NAROPIN) 0.5 % injection 1 mL  -     betamethasone acetate-betamethasone sodium phosphate (CELESTONE) injection 6 mg          73 y.o. male with left long TF and left thumb CMC OA  Previous XRs reviewed with pt today.  Treatment options and expected outcomes were discussed.  The patient verbalized understanding of exam findings and treatment plan.   The patient was given the opportunity to ask questions.  Questions were answered to the patient's satisfaction.  The patient decided to move forward with 2nd injx for his L long TF as well as 1st injx for the L thumb CMC joint.  Pt given name of comfort cool brace as well.       Follow Up:  3 months PRN      To Do Next Visit:  Re-evaluation of current issue      Discussions:  Thumb CMC Arthritis: The anatomy and physiology of carpometacarpal joint arthritis was discussed with the patient today in the office.  Deterioration of the articular cartilage eventually leads to hypermobility at the thumb CMC joint, resulting in joint subluxation, osteophyte formation, cystic changes within the trapezium and base of the first metacarpal, as well as subchondral sclerosis.  Eventually, pain, limited mobility, and compensatory hyperextension at the metacarpophalangeal joint may develop.  While normal activity and usage of the thumb joint may provide a painful experience to the patient, this typically does not result in damage to the thumb or hand.  Treatment options include resting thumb spica splints to decreased joint edema,  pain, and inflammation.  Therapy exercises to strengthen the thenar musculature may relieve pain, but do not alter the overall continued development of osteoarthritis.  Oral medications, topical medications, corticosteroid injections may decrease pain and increase overall function.  Eventually, approximately 5% of patients may require surgical intervention.       Ed Amin MD  Attending, Orthopaedic Surgery  Hand, Wrist, and Elbow Surgery  [facility] Orthopaedic [facility]    ______________________________________________________________________________________________    CHIEF COMPLAINT:  Chief Complaint   Patient presents with   • Left Hand - Follow-up   • Left Wrist - Follow-up       SUBJECTIVE:  Patient is a 73 y.o. RHD male who presents today for left hand/wrist pain. Pt states he was painting a piece of furniture and held himself up in an extended wrist position a couple months ago when he started to notice pain in his wrist/thumb area. States this is different than the pain he had before (DeQuervain's). Feels like pain is worse with gripping. He also has noticed his left long finger starting to click again.      Occupation:      I have personally reviewed all the relevant PMH, PSH, SH, FH, Medications and allergies      PAST MEDICAL HISTORY:  Past Medical History:   Diagnosis Date   • Cataract, right eye     last assessed 04/21/2015   • CPAP (continuous positive airway pressure) dependence     no currently use   • Hypertension    • Pneumonia    • Sleep apnea        PAST SURGICAL HISTORY:  Past Surgical History:   Procedure Laterality Date   • CATARACT EXTRACTION, BILATERAL     • EXAMINATION UNDER ANESTHESIA N/A 8/25/2022    Procedure: EXAM UNDER ANESTHESIA (EUA) Drug Induced Sleep Endoscopy;  Surgeon: Tin Rodriguez MD;  Location[facility];  Service: ENT   • MYRINGOTOMY W/ TUBES     • MT REPAIR NASAL VESTIBULAR STENOSIS Bilateral 8/25/2022    Procedure: REPAIR VESTIBULAR  "STENOSIS;  Surgeon: Tin Rodriguez MD;  [facility];  Service: ENT   • CT SEPTOPLASTY/SUBMUCOUS RESECJ W/WO CARTILAGE GRF N/A 8/25/2022    Procedure: SEPTOPLASTY;  Surgeon: Tin Rodriguez MD;  [facility];  Service: ENT   • CT SUBMUCOUS RESCJ INFERIOR TURBINATE PRTL/COMPL Bilateral 8/25/2022    Procedure: TURBINECTOMY;  Surgeon: Tin Rodriguez MD;  [facility];  Service: ENT       FAMILY HISTORY:  Family History   Problem Relation Age of Onset   • Hypertension Father    • No Known Problems Mother        SOCIAL HISTORY:  Social History     Tobacco Use   • Smoking status: Never   • Smokeless tobacco: Never   Vaping Use   • Vaping status: Never Used   Substance Use Topics   • Alcohol use: Yes     Alcohol/week: 2.0 standard drinks of alcohol     Types: 2 Shots of liquor per week   • Drug use: Never       MEDICATIONS:    Current Outpatient Medications:   •  amLODIPine-benazepril (LOTREL) 10-20 MG per capsule, Take 1 capsule by mouth daily, Disp: 90 capsule, Rfl: 1  •  sildenafil (VIAGRA) 100 mg tablet, Take 1 tablet (100 mg total) by mouth daily as needed for erectile dysfunction for up to 10 days, Disp: 10 tablet, Rfl: 5  No current facility-administered medications for this visit.    ALLERGIES:  No Known Allergies        REVIEW OF SYSTEMS:  Musculoskeletal:        As noted in HPI.   All other systems reviewed and are negative.    VITALS:  There were no vitals filed for this visit.    LABS:  HgA1c: No results found for: \"HGBA1C\"  BMP:   Lab Results   Component Value Date    CALCIUM 9.1 04/25/2024    K 3.8 04/25/2024    CO2 25 04/25/2024     04/25/2024    BUN 12 04/25/2024    CREATININE 0.94 04/25/2024       _____________________________________________________  PHYSICAL EXAMINATION:  General: Well developed and well nourished, alert & oriented x 3, appears comfortable  Psychiatric: Normal  HEENT: Normocephalic, Atraumatic Trachea Midline, No torticollis  Pulmonary: No " "audible wheezing or respiratory distress   Abdomen/GI: Non tender, non distended   Cardiovascular: No pitting edema, 2+ radial pulse   Skin: No masses, erythema, lacerations, fluctation, ulcerations  Neurovascular: Sensation Intact to the Median, Ulnar, Radial Nerve, Motor Intact to the Median, Ulnar, Radial Nerve, and Pulses Intact  Musculoskeletal: Normal, except as noted in detailed exam and in HPI.      MUSCULOSKELETAL EXAMINATION:  Left Wrist:  Mild ttp 1st dorsal compartment.  More ttp along the thumb CMC joint.  Milder ttp radiocarpal joint.  - CMC grind.  - Finkelstein's.  No pain with PROM of wrist.   Nontender to palpation A1 pulley long finger.  + click felt here.   Brisk capillary refill.     ___________________________________________________  STUDIES REVIEWED:  Previous Xrays of the left wrist were reviewed and independently interpreted in PACS by Dr. Amin and demonstrate significant degeneration about the thumb CMC joint and to a lesser degree STT joint.          PROCEDURES PERFORMED:  Small joint arthrocentesis: L thumb CMC  [place] Protocol:  Consent: Verbal consent obtained.  Risks and benefits: risks, benefits and alternatives were discussed  Consent given by: patient  Time out: Immediately prior to procedure a \"time out\" was called to verify the correct patient, procedure, equipment, support staff and site/side marked as required.  Patient understanding: patient states understanding of the procedure being performed  Patient identity confirmed: verbally with patient  Supporting Documentation  Indications: pain   Procedure Details  Location: thumb - L thumb CMC  Needle size: 25 G  Ultrasound guidance: no  Approach: volar  Medications administered: 6 mg betamethasone acetate-betamethasone sodium phosphate 6 (3-3) mg/mL; 1 mL ropivacaine 0.5 %    Patient tolerance: patient tolerated the procedure well with no immediate complications  Dressing:  Sterile dressing applied      Hand/upper " "extremity injection: L long A1  Universal Protocol:  Consent: Verbal consent obtained.  Risks and benefits: risks, benefits and alternatives were discussed  Consent given by: patient  Time out: Immediately prior to procedure a \"time out\" was called to verify the correct patient, procedure, equipment, support staff and site/side marked as required.  Patient understanding: patient states understanding of the procedure being performed  Site marked: the operative site was marked  Patient identity confirmed: verbally with patient  Supporting Documentation  Indications: tendon swelling   Procedure Details  Condition:trigger finger Location: long finger - L long A1   Preparation: Patient was prepped and draped in the usual sterile fashion  Needle size: 25 G  Approach: volar  Medications administered: 6 mg betamethasone acetate-betamethasone sodium phosphate 6 (3-3) mg/mL; 1 mL ropivacaine 0.5 %  Patient tolerance: patient tolerated the procedure well with no immediate complications  Dressing:  Sterile dressing applied              _____________________________________________________      Scribe Attestation    I,:  Nohemy Barr PA-C am acting as a scribe while in the presence of the attending physician.:       I,:  Ed Amin MD personally performed the services described in this documentation    as scribed in my presence.:         "

## 2024-10-03 ENCOUNTER — OFFICE VISIT (OUTPATIENT)
Dept: OBGYN CLINIC | Facility: CLINIC | Age: 74
End: 2024-10-03
Payer: COMMERCIAL

## 2024-10-03 VITALS
DIASTOLIC BLOOD PRESSURE: 75 MMHG | HEART RATE: 59 BPM | SYSTOLIC BLOOD PRESSURE: 146 MMHG | BODY MASS INDEX: 30.62 KG/M2 | HEIGHT: 68 IN | WEIGHT: 202 LBS

## 2024-10-03 DIAGNOSIS — M65.4 DE QUERVAIN'S TENOSYNOVITIS, LEFT: Primary | ICD-10-CM

## 2024-10-03 PROCEDURE — 20550 NJX 1 TENDON SHEATH/LIGAMENT: CPT | Performed by: STUDENT IN AN ORGANIZED HEALTH CARE EDUCATION/TRAINING PROGRAM

## 2024-10-03 PROCEDURE — 99214 OFFICE O/P EST MOD 30 MIN: CPT | Performed by: STUDENT IN AN ORGANIZED HEALTH CARE EDUCATION/TRAINING PROGRAM

## 2024-10-03 RX ORDER — BETAMETHASONE SODIUM PHOSPHATE AND BETAMETHASONE ACETATE 3; 3 MG/ML; MG/ML
6 INJECTION, SUSPENSION INTRA-ARTICULAR; INTRALESIONAL; INTRAMUSCULAR; SOFT TISSUE
Status: COMPLETED | OUTPATIENT
Start: 2024-10-03 | End: 2024-10-03

## 2024-10-03 RX ORDER — LIDOCAINE HYDROCHLORIDE 10 MG/ML
1 INJECTION, SOLUTION INFILTRATION; PERINEURAL
Status: COMPLETED | OUTPATIENT
Start: 2024-10-03 | End: 2024-10-03

## 2024-10-03 RX ADMIN — BETAMETHASONE SODIUM PHOSPHATE AND BETAMETHASONE ACETATE 6 MG: 3; 3 INJECTION, SUSPENSION INTRA-ARTICULAR; INTRALESIONAL; INTRAMUSCULAR; SOFT TISSUE at 09:15

## 2024-10-03 RX ADMIN — LIDOCAINE HYDROCHLORIDE 1 ML: 10 INJECTION, SOLUTION INFILTRATION; PERINEURAL at 09:15

## 2024-10-03 NOTE — PROGRESS NOTES
ORTHOPAEDIC HAND, WRIST, AND ELBOW OFFICE  VISIT      ASSESSMENT/PLAN:      Diagnoses and all orders for this visit:    De Quervain's tenosynovitis, left  -     Hand/upper extremity injection: L extensor compartment 1          73 y.o. male with left wrist de quervain's    The patient did not see good relief from the previous left thumb CMC injection. He did respond well to previous 1st dorsal compartment injections in the based. Discussed with him based off exam, he seems to be more symptomatic of left de quervain's. We discussed a repeat injection for this today which he was agreeable to. The patient consented and underwent a left 1st dorsal compartment steroid injection in the office today without any complications. Discussed possible surgical intervention if needed in the future.    Follow Up:  PRN       To Do Next Visit:             Ed Amin MD  Attending, Orthopaedic Surgery  Hand, Wrist, and Elbow Surgery  [facility] Orthopaedic [facility]    ______________________________________________________________________________________________    CHIEF COMPLAINT:  Chief Complaint   Patient presents with    Follow-up     Patient is still having pain in his wrist. He feels like it is locking        SUBJECTIVE:  Patient is a 73 y.o. RHD male who presents today for follow up of left wrist pain. The patient underwent a left thumb CMC injection on 8/30/4 which he states did not provide him with any relief. He notes pain to the radial aspect of his wrist that radiates dorsally. He also feels like the wrist will lock. He underwent a left long TF injection also at his last visit which worked well. He states the finger is no longer locking.      Occupation:       I have personally reviewed all the relevant PMH, PSH, SH, FH, Medications and allergies      PAST MEDICAL HISTORY:  Past Medical History:   Diagnosis Date    Cataract, right eye     last assessed 04/21/2015    CPAP (continuous positive airway  "pressure) dependence     no currently use    Hypertension     Pneumonia     Sleep apnea        PAST SURGICAL HISTORY:  Past Surgical History:   Procedure Laterality Date    CATARACT EXTRACTION, BILATERAL      EXAMINATION UNDER ANESTHESIA N/A 8/25/2022    Procedure: EXAM UNDER ANESTHESIA (EUA) Drug Induced Sleep Endoscopy;  Surgeon: Tin Rodriguez MD;  Location: [facility];  Service: ENT    MYRINGOTOMY W/ TUBES      WA REPAIR NASAL VESTIBULAR STENOSIS Bilateral 8/25/2022    Procedure: REPAIR VESTIBULAR STENOSIS;  Surgeon: Tin Rodriguez MD;  Location: [facility];  Service: ENT    WA SEPTOPLASTY/SUBMUCOUS RESECJ W/WO CARTILAGE GRF N/A 8/25/2022    Procedure: SEPTOPLASTY;  Surgeon: Tin Rodriguez MD;  Location: [facility];  Service: ENT    WA SUBMUCOUS RESCJ INFERIOR TURBINATE PRTL/COMPL Bilateral 8/25/2022    Procedure: TURBINECTOMY;  Surgeon: Tin Rodriguez MD;  Location: [facility];  Service: ENT       FAMILY HISTORY:  Family History   Problem Relation Age of Onset    Hypertension Father     No Known Problems Mother        SOCIAL HISTORY:  Social History     Tobacco Use    Smoking status: Never    Smokeless tobacco: Never   Vaping Use    Vaping status: Never Used   Substance Use Topics    Alcohol use: Yes     Alcohol/week: 2.0 standard drinks of alcohol     Types: 2 Shots of liquor per week    Drug use: Never       MEDICATIONS:    Current Outpatient Medications:     amLODIPine-benazepril (LOTREL) 10-20 MG per capsule, Take 1 capsule by mouth daily, Disp: 90 capsule, Rfl: 1    sildenafil (VIAGRA) 100 mg tablet, Take 1 tablet (100 mg total) by mouth daily as needed for erectile dysfunction for up to 10 days, Disp: 10 tablet, Rfl: 5    ALLERGIES:  No Known Allergies        REVIEW OF SYSTEMS:  Musculoskeletal:        As noted in HPI.   All other systems reviewed and are negative.    VITALS:  Vitals:    10/03/24 0922   BP: 146/75   Pulse: 59       LABS:  HgA1c: No results found for: \"HGBA1C\"  BMP: "   Lab Results   Component Value Date    CALCIUM 9.1 04/25/2024    K 3.8 04/25/2024    CO2 25 04/25/2024     04/25/2024    BUN 12 04/25/2024    CREATININE 0.94 04/25/2024       _____________________________________________________  PHYSICAL EXAMINATION:  General: Well developed and well nourished, alert & oriented x 3, appears comfortable  Psychiatric: Normal  HEENT: Normocephalic, Atraumatic Trachea Midline, No torticollis  Pulmonary: No audible wheezing or respiratory distress   Abdomen/GI: Non tender, non distended   Cardiovascular: No pitting edema, 2+ radial pulse   Skin: No masses, erythema, lacerations, fluctation, ulcerations  Neurovascular: Sensation Intact to the Median, Ulnar, Radial Nerve, Motor Intact to the Median, Ulnar, Radial Nerve, and Pulses Intact  Musculoskeletal: Normal, except as noted in detailed exam and in HPI.      MUSCULOSKELETAL EXAMINATION:  Left wrist  TTP 1st dorsal compartment  + finkelstein   NTTP A1 pulley long finger  Full digit extension  ___________________________________________________  STUDIES REVIEWED:  No new studies to review         PROCEDURES PERFORMED:  Hand/upper extremity injection: L extensor compartment 1  Universal Protocol:  procedure performed by consultantConsent: Verbal consent obtained.  Consent given by: patient  Patient identity confirmed: verbally with patient  Supporting Documentation  Indications: pain   Procedure Details  Condition:de Quervain's tenosynovitis Site: L extensor compartment 1   Preparation: Patient was prepped and draped in the usual sterile fashion  Needle size: 25 G  Ultrasound guidance: no  Medications administered: 1 mL lidocaine 1 %; 6 mg betamethasone acetate-betamethasone sodium phosphate 6 (3-3) mg/mL  Patient tolerance: patient tolerated the procedure well with no immediate complications  Dressing:  Sterile dressing applied              _____________________________________________________      Walter Subramanian      I,:   Stephani Burden MA am acting as a scribe while in the presence of the attending physician.:       I,:  Ed Amin MD personally performed the services described in this documentation    as scribed in my presence.:

## 2024-12-06 DIAGNOSIS — I10 BENIGN ESSENTIAL HYPERTENSION: ICD-10-CM

## 2024-12-06 RX ORDER — AMLODIPINE AND BENAZEPRIL HYDROCHLORIDE 10; 20 MG/1; MG/1
1 CAPSULE ORAL DAILY
Qty: 90 CAPSULE | Refills: 1 | Status: SHIPPED | OUTPATIENT
Start: 2024-12-06

## 2025-02-18 DIAGNOSIS — N52.9 ERECTILE DYSFUNCTION, UNSPECIFIED ERECTILE DYSFUNCTION TYPE: ICD-10-CM

## 2025-02-18 NOTE — TELEPHONE ENCOUNTER
Reason for call:   [x] Refill   [] Prior Auth  [] Other:     Office:   [x] PCP/Provider -   [] Specialty/Provider -     Medication: VIAGRA    Dose/Frequency: 100 MG    Quantity: 10    Pharmacy:   [address] [facility] [address] [address]  Phone: [phone]  Fax: [phone]     Does the patient have enough for 3 days?   [] Yes   [x] No - Send as HP to POD

## 2025-02-19 RX ORDER — SILDENAFIL 100 MG/1
100 TABLET, FILM COATED ORAL DAILY PRN
Qty: 10 TABLET | Refills: 0 | OUTPATIENT
Start: 2025-02-19 | End: 2025-03-01

## 2025-02-19 NOTE — TELEPHONE ENCOUNTER
Spoke with pt and he scheduled an appointment for 2/20/25 he will be out of town next week (so he requested this day)

## 2025-02-20 ENCOUNTER — OFFICE VISIT (OUTPATIENT)
Age: 75
End: 2025-02-20
Payer: COMMERCIAL

## 2025-02-20 VITALS
WEIGHT: 206 LBS | SYSTOLIC BLOOD PRESSURE: 124 MMHG | HEART RATE: 59 BPM | TEMPERATURE: 97.5 F | OXYGEN SATURATION: 97 % | DIASTOLIC BLOOD PRESSURE: 66 MMHG | BODY MASS INDEX: 31.32 KG/M2

## 2025-02-20 DIAGNOSIS — E78.49 OTHER HYPERLIPIDEMIA: ICD-10-CM

## 2025-02-20 DIAGNOSIS — I10 BENIGN ESSENTIAL HYPERTENSION: Primary | ICD-10-CM

## 2025-02-20 DIAGNOSIS — N52.9 ERECTILE DYSFUNCTION, UNSPECIFIED ERECTILE DYSFUNCTION TYPE: ICD-10-CM

## 2025-02-20 DIAGNOSIS — Z13.1 SCREENING FOR DIABETES MELLITUS (DM): ICD-10-CM

## 2025-02-20 PROCEDURE — G2211 COMPLEX E/M VISIT ADD ON: HCPCS | Performed by: FAMILY MEDICINE

## 2025-02-20 PROCEDURE — 99214 OFFICE O/P EST MOD 30 MIN: CPT | Performed by: FAMILY MEDICINE

## 2025-02-20 RX ORDER — SILDENAFIL 100 MG/1
100 TABLET, FILM COATED ORAL DAILY PRN
Qty: 10 TABLET | Refills: 3 | Status: SHIPPED | OUTPATIENT
Start: 2025-02-20 | End: 2025-03-02

## 2025-02-20 NOTE — ASSESSMENT & PLAN NOTE
Orders:    CBC and differential; Future    Comprehensive metabolic panel; Future    Lipid panel; Future    TSH, 3rd generation with Free T4 reflex; Future    Hemoglobin A1C; Future

## 2025-02-20 NOTE — PROGRESS NOTES
Name: Amando Rivas      : 1950      MRN: [id number]  Encounter Provider: Efra Aguilera MD  Encounter Date: 2025   [facility]  :  Assessment & Plan  Benign essential hypertension    Orders:    CBC and differential; Future    Comprehensive metabolic panel; Future    Lipid panel; Future    TSH, 3rd generation with Free T4 reflex; Future    Hemoglobin A1C; Future    Screening for diabetes mellitus (DM)    Orders:    Comprehensive metabolic panel; Future    Hemoglobin A1C; Future    Other hyperlipidemia    Orders:    Comprehensive metabolic panel; Future    Lipid panel; Future    TSH, 3rd generation with Free T4 reflex; Future    Erectile dysfunction, unspecified erectile dysfunction type    Orders:    sildenafil (VIAGRA) 100 mg tablet; Take 1 tablet (100 mg total) by mouth daily as needed for erectile dysfunction for up to 10 days    Patient is 73 y/o male with history of HTN and ED who presents today for recheck.  He is advised to get blood work done to ensure all labs are normal and BP today is normal with the current regimen.  Recommended to continue with the current regimen and Rx refilled today.  Patient will have the blood work done after he return from out of town for a week.  He is leaving to [place] for family occasion and will be back in a week.         History of Present Illness   Hypertension  Patient is here for follow-up of elevated blood pressure. He is exercising and is adherent to a low-salt diet. Blood pressure is well controlled at home. Cardiac symptoms: none. Patient denies chest pain, chest pressure/discomfort, irregular heart beat, lower extremity edema, and palpitations. Cardiovascular risk factors: advanced age (older than 55 for men, 65 for women), hypertension, male gender, and obesity (BMI >= 30 kg/m2). Use of agents associated with hypertension: none. History of target organ damage: none.  Patient is on Viagra for erectile  dysfunction and has been using this medication on as needed basis.  He is overdue for labs otherwise the BP is well controlled.  We discussed precautions for use and advised him to get labs done within the next few weeks.  Rx refilled to his pharmacy.    Patient has had no adverse effect from using this medication and denies hypotension when using it.  Denies HA or dizziness or other symptoms of CP.         Review of Systems   Constitutional:  Negative for appetite change, chills, fatigue, fever and unexpected weight change.   HENT:  Negative for congestion.    Eyes:  Negative for visual disturbance.   Respiratory:  Negative for cough and shortness of breath.    Cardiovascular:  Negative for chest pain, palpitations and leg swelling.   Gastrointestinal:  Negative for abdominal pain, nausea and vomiting.   Genitourinary:  Negative for difficulty urinating and dysuria.   Musculoskeletal:  Negative for arthralgias and myalgias.   Skin:  Negative for pallor and rash.   Neurological:  Negative for dizziness, tremors, speech difficulty, weakness and light-headedness.   Psychiatric/Behavioral:  Negative for behavioral problems, decreased concentration and sleep disturbance.        Objective   /66   Pulse 59   Temp 97.5 °F (36.4 °C)   Wt 93.4 kg (206 lb)   SpO2 97%   BMI 31.32 kg/m²      Physical Exam  Constitutional:       General: He is not in acute distress.     Appearance: Normal appearance. He is not ill-appearing.   HENT:      Head: Normocephalic and atraumatic.   Eyes:      General: No scleral icterus.     Conjunctiva/sclera: Conjunctivae normal.   Cardiovascular:      Rate and Rhythm: Normal rate and regular rhythm.      Heart sounds: Normal heart sounds. No murmur heard.  Pulmonary:      Effort: Pulmonary effort is normal. No respiratory distress.      Breath sounds: Normal breath sounds. No wheezing.   Abdominal:      General: Bowel sounds are normal.      Palpations: Abdomen is soft.   Musculoskeletal:          General: Normal range of motion.      Cervical back: Normal range of motion and neck supple. No rigidity.      Right lower leg: No edema.      Left lower leg: No edema.   Skin:     Coloration: Skin is not pale.      Findings: No erythema.   Neurological:      General: No focal deficit present.      Mental Status: He is alert and oriented to person, place, and time.   Psychiatric:         Behavior: Behavior normal.         Thought Content: Thought content normal.

## 2025-06-06 ENCOUNTER — TELEPHONE (OUTPATIENT)
Age: 75
End: 2025-06-06

## 2025-06-06 ENCOUNTER — OFFICE VISIT (OUTPATIENT)
Age: 75
End: 2025-06-06
Payer: COMMERCIAL

## 2025-06-06 VITALS
HEART RATE: 62 BPM | OXYGEN SATURATION: 97 % | TEMPERATURE: 97.6 F | DIASTOLIC BLOOD PRESSURE: 60 MMHG | SYSTOLIC BLOOD PRESSURE: 118 MMHG | WEIGHT: 203.2 LBS | HEIGHT: 68 IN | BODY MASS INDEX: 30.8 KG/M2

## 2025-06-06 DIAGNOSIS — I10 BENIGN ESSENTIAL HYPERTENSION: ICD-10-CM

## 2025-06-06 DIAGNOSIS — J06.9 VIRAL URI: Primary | ICD-10-CM

## 2025-06-06 LAB
SARS-COV-2 AG UPPER RESP QL IA: NEGATIVE
VALID CONTROL: NORMAL

## 2025-06-06 PROCEDURE — 99213 OFFICE O/P EST LOW 20 MIN: CPT

## 2025-06-06 PROCEDURE — G2211 COMPLEX E/M VISIT ADD ON: HCPCS

## 2025-06-06 PROCEDURE — 87811 SARS-COV-2 COVID19 W/OPTIC: CPT

## 2025-06-06 RX ORDER — BENZONATATE 200 MG/1
200 CAPSULE ORAL 3 TIMES DAILY PRN
Qty: 20 CAPSULE | Refills: 0 | Status: SHIPPED | OUTPATIENT
Start: 2025-06-06

## 2025-06-06 RX ORDER — AMLODIPINE AND BENAZEPRIL HYDROCHLORIDE 10; 20 MG/1; MG/1
1 CAPSULE ORAL DAILY
Qty: 90 CAPSULE | Refills: 1 | Status: SHIPPED | OUTPATIENT
Start: 2025-06-06

## 2025-06-06 NOTE — PROGRESS NOTES
Name: Amando Rivas      : 1950      MRN: [id number]  Encounter Provider: Leslie Schultz MD  Encounter Date: 2025   Encounter department: [facility]  Assessment & Plan  Viral URI  COVID negative in office. Out of season for flu. Suspect viral illness given length of symptoms, lack of fever and benign exam.  Symptom management with supportive care therapy  Hot tea with honey/ginger, soups  Tylenol, Mucinex (Guaifenasin 1200 mg BID) for fever/cough  May consider humid showers/nasal rinses/antihistamines for congestion  Chloraseptic PRN for sore throat  Tessalon perles sent to pharmacy   Discussed drinking electrolyte containing fluids  Reviewed use of vitamins such as Vit C/Zinc  Discussed return precautions including worsening symptoms, prolonged fever, shortness of breath, chest pain  All questions ansered    Orders:    POCT Rapid Covid Ag    benzonatate (TESSALON) 200 MG capsule; Take 1 capsule (200 mg total) by mouth 3 (three) times a day as needed for cough    Benign essential hypertension  Tolerating medication well. No dizziness/lightheadedness. Refill provided. Encouraged to follow up for AWV in 1 month when recovered from viral illness.   Orders:    amLODIPine-benazepril (LOTREL) 10-20 MG per capsule; Take 1 capsule by mouth daily           History of Present Illness   HPI  Alan is a 73 yo M presenting with sick symptoms.  felt like hit by truck. Mucous, fatigue, body aches, sore throat (improving), cough. Eating and drinking somewhat, hydrating with water. Largely, symptoms are about the same as when they started. Never tested for flu or covid. Has been taking OTC medications which helps with symptoms. No sick contacts, has grandkids but they haven't been ill.     One episode of dizziness yesterday when sitting on sofa.     Review of Systems   Constitutional:  Positive for activity change, fatigue and fever. Negative for chills.   HENT:  Positive for  "congestion, rhinorrhea and sore throat.    Respiratory:  Positive for cough. Negative for shortness of breath and wheezing.    Cardiovascular:  Negative for chest pain.        With cough   Gastrointestinal:  Negative for abdominal pain, diarrhea, nausea and vomiting.   Musculoskeletal:  Positive for myalgias.   Skin:  Negative for rash.   Neurological:  Positive for dizziness. Negative for light-headedness and headaches.     Objective   /60   Pulse 62   Temp 97.6 °F (36.4 °C)   Ht 5' 8\" (1.727 m)   Wt 92.2 kg (203 lb 3.2 oz)   SpO2 97%   BMI 30.90 kg/m²      Physical Exam  Vitals and nursing note reviewed.   Constitutional:       General: He is not in acute distress.     Appearance: Normal appearance. He is well-developed.   HENT:      Head: Normocephalic and atraumatic.      Right Ear: Tympanic membrane normal.      Left Ear: Tympanic membrane normal.      Nose: Nose normal.      Mouth/Throat:      Mouth: Mucous membranes are moist.      Pharynx: Posterior oropharyngeal erythema present. No oropharyngeal exudate.     Eyes:      Conjunctiva/sclera: Conjunctivae normal.      Pupils: Pupils are equal, round, and reactive to light.       Cardiovascular:      Rate and Rhythm: Normal rate and regular rhythm.      Pulses: Normal pulses.      Heart sounds: Normal heart sounds. No murmur heard.  Pulmonary:      Effort: Pulmonary effort is normal. No respiratory distress.      Breath sounds: Normal breath sounds. No wheezing.   Abdominal:      General: There is no distension.      Palpations: Abdomen is soft.      Tenderness: There is no abdominal tenderness.     Musculoskeletal:         General: No swelling.      Cervical back: Neck supple.      Right lower leg: No edema.      Left lower leg: No edema.     Skin:     General: Skin is warm and dry.      Capillary Refill: Capillary refill takes less than 2 seconds.     Neurological:      General: No focal deficit present.      Mental Status: He is alert and " oriented to person, place, and time.     Psychiatric:         Mood and Affect: Mood normal.         Behavior: Behavior normal.         Results for orders placed or performed in visit on 06/06/25   POCT Rapid Covid Ag   Result Value Ref Range    POCT SARS-CoV-2 Ag Negative Negative    VALID CONTROL Valid        Leslie Ann-Marie Schultz MD  6/6/2025, 4:54 PM  PGY-2 [facility]

## 2025-06-06 NOTE — ASSESSMENT & PLAN NOTE
Tolerating medication well. No dizziness/lightheadedness. Refill provided. Encouraged to follow up for AWV in 1 month when recovered from viral illness.   Orders:    amLODIPine-benazepril (LOTREL) 10-20 MG per capsule; Take 1 capsule by mouth daily

## 2025-06-06 NOTE — TELEPHONE ENCOUNTER
Patient called regarding Rx: Benzonatate. States prescription not covered by insurance. Requesting alternative prescription to be sent.        Please review.  Thank you

## 2025-06-10 ENCOUNTER — TELEPHONE (OUTPATIENT)
Age: 75
End: 2025-06-10

## 2025-06-11 DIAGNOSIS — J32.9 SINUSITIS, UNSPECIFIED CHRONICITY, UNSPECIFIED LOCATION: Primary | ICD-10-CM

## 2025-06-11 RX ORDER — AZITHROMYCIN 250 MG/1
TABLET, FILM COATED ORAL
Qty: 6 TABLET | Refills: 0 | Status: SHIPPED | OUTPATIENT
Start: 2025-06-11 | End: 2025-06-16

## 2025-06-11 NOTE — TELEPHONE ENCOUNTER
Left patient a voicemail- also asked to give office a call back if any questions or concerns.  
Patient called back to see if any progress was made on writing him a script for antibiotics. Patient is requesting that once the script is written, could someone contact him so he can  the medication.  
Patient calling back for update, states called yesterday afternoon twice and has not yet gotten a response. Spoke with Adriane in the office and advised patient message has been sent to provider, awaiting response.  
Pt called in stated he still feels very ill if not worse then 06/06 when he saw  Pt state Dr offered antibiotics if he continued with symptoms. Please follow up [phone] [phone]  
24.6

## (undated) DEVICE — STRL COTTON TIP APPLCTR 6IN PK: Brand: CARDINAL HEALTH

## (undated) DEVICE — UNDYED MONOFILAMENT (POLYDIOXANONE), ABSORBABLE SYNTHETIC SURGICAL SUTURE: Brand: PDS

## (undated) DEVICE — PAD GROUNDING ADULT

## (undated) DEVICE — SUT ETHILON 3-0 FS-1 18 IN 663G

## (undated) DEVICE — SYRINGE 10ML LL CONTROL TOP

## (undated) DEVICE — 3M™ STERI-STRIP™ REINFORCED ADHESIVE SKIN CLOSURES, R1547, 1/2 IN X 4 IN (12 MM X 100 MM), 6 STRIPS/ENVELOPE: Brand: 3M™ STERI-STRIP™

## (undated) DEVICE — ELECTRODE BLADE MOD E-Z CLEAN  2.75IN 7CM -0012AM

## (undated) DEVICE — AMBU ASCOPE 4 RHINOLARYNGO SLIM SINGLE-USE, FLEXIBLE RHINOLARYNGOSCOPE STERILE FROM PACKAGE. INSERTION CORD DIAMETER 3.0 MM, LENGHT OF 300 MM. AND A 130-DEGREE BENDING ANGLE. MINIMUM PURCHASE 5 PCS = 1 BOX: Brand: ASCOPE™ 4 RHINOLARYNGO SLIM

## (undated) DEVICE — SKIN MARKER DUAL TIP WITH RULER CAP, FLEXIBLE RULER AND LABELS: Brand: DEVON

## (undated) DEVICE — BASIC SINGLE BASIN 2-LF: Brand: MEDLINE INDUSTRIES, INC.

## (undated) DEVICE — SPECIMEN CONTAINER STERILE PEEL PACK

## (undated) DEVICE — STERILE BETHLEHEM NASAL PACK: Brand: CARDINAL HEALTH

## (undated) DEVICE — POV-IOD SOLUTION 4OZ BT

## (undated) DEVICE — IV CATH 18 G X 1.16 IN

## (undated) DEVICE — GLOVE INDICATOR PI UNDERGLOVE SZ 7.5 BLUE

## (undated) DEVICE — GAUZE SPONGES,16 PLY: Brand: CURITY

## (undated) DEVICE — PENCIL ELECTROSURG E-Z CLEAN -0035H

## (undated) DEVICE — SUT PROLENE 5-0 P-3 18 IN 8698G

## (undated) DEVICE — NEEDLE 27 G X 1 1/4

## (undated) DEVICE — 3M™ STERI-STRIP™ COMPOUND BENZOIN TINCTURE 40 BAGS/CARTON 4 CARTONS/CASE C1544: Brand: 3M™ STERI-STRIP™

## (undated) DEVICE — SUT ETHILON 4-0 PS-2 18 IN 1667H

## (undated) DEVICE — NEURO PATTIES 1/2 X 3

## (undated) DEVICE — BLADE BEAVER MINI SZ 67

## (undated) DEVICE — SUT PLAIN 4-0 SC-1 18 IN 1828H

## (undated) DEVICE — ELECTRODE NEEDLE MEGAFINE 2IN E-Z CLEAN MEGADYNE -0118

## (undated) DEVICE — STERILE NASAL PACK: Brand: CARDINAL HEALTH

## (undated) DEVICE — SUT CHROMIC 5-0 P-3 18 IN 687G

## (undated) DEVICE — SPLINT 1524050 5PK PAIR DOYLE II AIRWAY: Brand: DOYLE II ™

## (undated) DEVICE — INTENDED FOR TISSUE SEPARATION, AND OTHER PROCEDURES THAT REQUIRE A SHARP SURGICAL BLADE TO PUNCTURE OR CUT.: Brand: BARD-PARKER ® CARBON RIB-BACK BLADES

## (undated) DEVICE — SUT PROLENE 6-0 P-3 18 IN 8695G

## (undated) DEVICE — GLOVE SRG BIOGEL 7.5